# Patient Record
Sex: FEMALE | Race: WHITE | NOT HISPANIC OR LATINO | Employment: OTHER | URBAN - METROPOLITAN AREA
[De-identification: names, ages, dates, MRNs, and addresses within clinical notes are randomized per-mention and may not be internally consistent; named-entity substitution may affect disease eponyms.]

---

## 2018-05-17 ENCOUNTER — OFFICE VISIT (OUTPATIENT)
Dept: MEDICAL GROUP | Facility: PHYSICIAN GROUP | Age: 53
End: 2018-05-17
Payer: MEDICARE

## 2018-05-17 VITALS
DIASTOLIC BLOOD PRESSURE: 74 MMHG | OXYGEN SATURATION: 96 % | HEIGHT: 68 IN | SYSTOLIC BLOOD PRESSURE: 118 MMHG | BODY MASS INDEX: 33.8 KG/M2 | TEMPERATURE: 97.6 F | HEART RATE: 88 BPM | WEIGHT: 223 LBS

## 2018-05-17 DIAGNOSIS — F32.5 MAJOR DEPRESSIVE DISORDER WITH SINGLE EPISODE, IN FULL REMISSION (HCC): ICD-10-CM

## 2018-05-17 DIAGNOSIS — G35 MS (MULTIPLE SCLEROSIS) (HCC): ICD-10-CM

## 2018-05-17 DIAGNOSIS — D68.59 PROTEIN S DEFICIENCY (HCC): ICD-10-CM

## 2018-05-17 DIAGNOSIS — Z88.9 MULTIPLE ALLERGIES: ICD-10-CM

## 2018-05-17 DIAGNOSIS — E66.9 OBESITY (BMI 30-39.9): ICD-10-CM

## 2018-05-17 PROCEDURE — 99205 OFFICE O/P NEW HI 60 MIN: CPT | Performed by: FAMILY MEDICINE

## 2018-05-17 RX ORDER — WARFARIN SODIUM 10 MG/1
10 TABLET ORAL DAILY
COMMUNITY
End: 2018-05-17 | Stop reason: SDUPTHER

## 2018-05-17 RX ORDER — DIAZEPAM 5 MG/1
5 TABLET ORAL EVERY 6 HOURS PRN
COMMUNITY
End: 2018-08-06

## 2018-05-17 RX ORDER — WARFARIN SODIUM 10 MG/1
10 TABLET ORAL DAILY
Qty: 90 TAB | Refills: 1 | Status: ON HOLD | OUTPATIENT
Start: 2018-05-17 | End: 2018-08-07

## 2018-05-17 RX ORDER — PAROXETINE HYDROCHLORIDE 40 MG/1
40 TABLET, FILM COATED ORAL DAILY
Status: ON HOLD | COMMUNITY
End: 2018-08-15 | Stop reason: SDUPTHER

## 2018-05-17 RX ORDER — WARFARIN SODIUM 7.5 MG/1
7.5 TABLET ORAL DAILY
Qty: 90 TAB | Refills: 1 | Status: ON HOLD | OUTPATIENT
Start: 2018-05-17 | End: 2018-08-07

## 2018-05-17 RX ORDER — WARFARIN SODIUM 7.5 MG/1
7.5 TABLET ORAL DAILY
COMMUNITY
End: 2018-05-17 | Stop reason: SDUPTHER

## 2018-05-17 RX ORDER — CLONAZEPAM 2 MG/1
1 TABLET ORAL 2 TIMES DAILY
COMMUNITY
End: 2018-08-06

## 2018-05-17 ASSESSMENT — PATIENT HEALTH QUESTIONNAIRE - PHQ9: CLINICAL INTERPRETATION OF PHQ2 SCORE: 0

## 2018-05-17 NOTE — ASSESSMENT & PLAN NOTE
This problem is new to me; patient reports a long-standing history of allergies for which she takes over-the-counter Flonase and has used Claritin, Allegra, and Benadryl.

## 2018-05-17 NOTE — PROGRESS NOTES
Nadia Lazaro is a 52 y.o. female here for MS, prot S def, depression, allergies, obesity  HPI:  This is pleasant 53 yo F here today to [facility] care and presents with the following concerns:  MS (multiple sclerosis) (HCC)  This problem is new to me; patient reports a long-standing history of multiple sclerosis. She currently is being followed by neurologist and is on tysabri for medical management of this issue. She does report that she has multiple pain issues secondary to this problem. As a result she also follows with pain management and has a baclofen pain pump.    Protein S deficiency (HCC)  This problem is new to me; patient reports a long-standing history of protein S deficiency that was diagnosed when she was 16 years old. As a result she is currently anticoagulated on alternating days of Coumadin 10 mg and 7.5 mg. She states that in her lifetime she has had multiple blood clots in her legs and one PE. 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 She states that this does help to stabilize her mood; she currently denies any suicidal or homicidal ideation. She currently denies any suicidal plan, previous attempt, and a good support system    Multiple allergies  This problem is new to me; patient reports a long-standing history of allergies for which she takes over-the-counter Flonase and has used Claritin, Allegra, and Benadryl.    Obesity (BMI 30-39.9)  This problem is new to me; patient reports a long-standing history of issues with her weight. She states it is difficult for her to get much exercise due to pain. She does try to eat a healthy diet.    Current medicines (including changes today)  Current Outpatient Prescriptions   Medication Sig Dispense Refill   • paroxetine  (PAXIL) 40 MG tablet Take 40 mg by mouth every day.     • diazePAM (VALIUM) 5 MG Tab Take 5 mg by mouth every 6 hours as needed for Anxiety.     • clonazepam (KLONOPIN) 2 MG tablet Take 1 mg by mouth 2 times a day.     • warfarin (COUMADIN) 10 MG Tab Take 1 Tab by mouth every day. 90 Tab 1   • warfarin (COUMADIN) 7.5 MG Tab Take 1 Tab by mouth every day. 90 Tab 1   • BACLOFEN IT 60 mcg by Intrathecal route Continuous.     • acetaminophen (TYLENOL) 325 MG TABS Take 650 mg by mouth every four hours as needed. For minor aches and pains.  Indications: Pain     • docosahexanoic acid (FISH OIL) 1000 MG CAPS Take 1,000 mg by mouth every day.     • Natalizumab (TYSABRI IV) by Intravenous route. Every 8 weeks     • Multiple Vitamin (MULTIVITAMIN PO) Take  by mouth every day.     • vitamin D (CHOLECALCIFEROL) 1000 UNIT TABS Take 1,000 Units by mouth 2 Times a Day.       No current facility-administered medications for this visit.      She  has a past medical history of Backpain; Bronchitis; Fall; Heart burn; Indigestion; MS (multiple sclerosis) ([facility]); Personal history of venous thrombosis and embolism ([id number]); Protein S deficiency ([facility]); Psychiatric problem; and Unspecified urinary incontinence. She also has no past medical history of Angina; Arrhythmia; Arthritis; ASTHMA; Cancer ([facility]); CATARACT; Congestive heart failure ([facility]); COPD; Diabetes; Dialysis; Glaucoma; Heart murmur; Heart valve disease; Hypertension; Infectious disease; Jaundice; Myocardial infarct ([facility]); Other specified symptom associated with female genital organs; Pacemaker; Pneumonia; Renal disorder; Rheumatic fever; Seizure ([facility]); Stroke ([facility]); Unspecified disorder of thyroid; or Unspecified hemorrhagic conditions.  She  has a past surgical history that includes pump insert/remove (8/24/2010); other neurological surg; and pump insert/remove (10/19/2010).  Social History   Substance Use Topics   • Smoking status: Current Some Day Smoker     Packs/day: 0.25  "  • Smokeless tobacco: Never Used      Comment: 4 cigs/day, trying to quit   • Alcohol use No     Social History     Social History Narrative   • No narrative on file     Family History   Problem Relation Age of Onset   • Hypertension     • No Known Problems Mother    • Lung Disease Father      emphysema   • No Known Problems Sister    • No Known Problems Child    • Cancer Neg Hx    • Diabetes Neg Hx      Family Status   Relation Status   •     • Mother Alive   • Father    • Sister Alive   • Child Alive   • Neg Hx          ROS  No chest pain, no short of breath, no abdominal pain  All other systems reviewed and are negative     Objective:     Blood pressure 118/74, pulse 88, temperature 36.4 °C (97.6 °F), height 1.727 m (5' 8\"), weight 101.2 kg (223 lb), SpO2 96 %. Body mass index is 33.91 kg/m².  Physical Exam:    Constitutional: Alert, no distress.  Skin: Warm, dry, good turgor, no rashes in visible areas.  Eye: Equal, round and reactive, conjunctiva clear, lids normal.  ENMT: Lips without lesions, good dentition, oropharynx clear.  Neck: Trachea midline, no masses, no thyromegaly. No cervical or supraclavicular lymphadenopathy.  Respiratory: Unlabored respiratory effort, lungs clear to auscultation, no wheezes, no ronchi.  Cardiovascular: Normal S1, S2, no murmur, no edema.  Abdomen: Soft, non-tender, no masses, no hepatosplenomegaly.  Psych: Alert and oriented x3, normal affect and mood.  Neuro: CN 2-12 grossly intact      Assessment and Plan:   The following treatment plan was discussed     1. MS (multiple sclerosis) (HCC)  CBC WITH DIFFERENTIAL    Stable. Continue current medications; continue follow-up with neurology as directed; labs ordered for evaluation   2. Protein S deficiency (HCC)  REFERRAL TO ANTICOAGULATION MONITORING    Stable. Continue current medications; medication refill provided; referral to anticoagulation clinic for monitoring   3. Major depressive disorder with single episode, in " full remission (HCC)      Stable. Continue current medications; monitor   4. Multiple allergies      Stable. Continue Flonase and over-the-counter allergy medication as needed for symptom management   5. Obesity (BMI 30-39.9)  COMP METABOLIC PANEL    LIPID PROFILE    TSH    Uncontrolled; strongly encouraged patient to continue healthy eating plan and exercise as much as possible. Labs ordered for evaluation        Records requested.  Followup: Return in about 6 months (around 11/17/2018) for medication review, Short.

## 2018-05-17 NOTE — ASSESSMENT & PLAN NOTE
This problem is new to me; patient reports a long-standing history of multiple sclerosis. She currently is being followed by neurologist and is on tysabri for medical management of this issue. She does report that she has multiple pain issues secondary to this problem. As a result she also follows with pain management and has a baclofen pain pump.

## 2018-05-17 NOTE — ASSESSMENT & PLAN NOTE
This problem is new to me; patient has long-standing history of depression which she reports is well managed with Paxil 40 mg daily. She states that this does help to stabilize her mood; she currently denies any suicidal or homicidal ideation. She currently denies any suicidal plan, previous attempt, and a good support system

## 2018-05-17 NOTE — ASSESSMENT & PLAN NOTE
This problem is new to me; patient reports a long-standing history of protein S deficiency that was diagnosed when she was 16 years old. As a result she is currently anticoagulated on alternating days of Coumadin 10 mg and 7.5 mg. She states that in her lifetime she has had multiple blood clots in her legs and one PE. 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

## 2018-05-17 NOTE — ASSESSMENT & PLAN NOTE
This problem is new to me; patient reports a long-standing history of issues with her weight. She states it is difficult for her to get much exercise due to pain. She does try to eat a healthy diet.

## 2018-05-18 ENCOUNTER — TELEPHONE (OUTPATIENT)
Dept: VASCULAR LAB | Facility: MEDICAL CENTER | Age: 53
End: 2018-05-18

## 2018-05-18 NOTE — TELEPHONE ENCOUNTER
Called and left msg for pt to call back to establish care.  Pt's on warfarin.     Bea aMbry, PharmD

## 2018-05-19 ENCOUNTER — HOSPITAL ENCOUNTER (OUTPATIENT)
Dept: LAB | Facility: MEDICAL CENTER | Age: 53
End: 2018-05-19
Attending: FAMILY MEDICINE
Payer: MEDICARE

## 2018-05-19 DIAGNOSIS — E66.9 OBESITY (BMI 30-39.9): ICD-10-CM

## 2018-05-19 DIAGNOSIS — G35 MS (MULTIPLE SCLEROSIS) (HCC): ICD-10-CM

## 2018-05-19 LAB
ALBUMIN SERPL BCP-MCNC: 3.9 G/DL (ref 3.2–4.9)
ALBUMIN/GLOB SERPL: 1.7 G/DL
ALP SERPL-CCNC: 78 U/L (ref 30–99)
ALT SERPL-CCNC: 12 U/L (ref 2–50)
ANION GAP SERPL CALC-SCNC: 5 MMOL/L (ref 0–11.9)
AST SERPL-CCNC: 15 U/L (ref 12–45)
BASOPHILS # BLD AUTO: 0 % (ref 0–1.8)
BASOPHILS # BLD: 0 K/UL (ref 0–0.12)
BILIRUB SERPL-MCNC: 0.3 MG/DL (ref 0.1–1.5)
BUN SERPL-MCNC: 11 MG/DL (ref 8–22)
CALCIUM SERPL-MCNC: 9.1 MG/DL (ref 8.5–10.5)
CHLORIDE SERPL-SCNC: 111 MMOL/L (ref 96–112)
CHOLEST SERPL-MCNC: 193 MG/DL (ref 100–199)
CO2 SERPL-SCNC: 26 MMOL/L (ref 20–33)
CREAT SERPL-MCNC: 0.82 MG/DL (ref 0.5–1.4)
EOSINOPHIL # BLD AUTO: 0.39 K/UL (ref 0–0.51)
EOSINOPHIL NFR BLD: 3.5 % (ref 0–6.9)
ERYTHROCYTE [DISTWIDTH] IN BLOOD BY AUTOMATED COUNT: 49 FL (ref 35.9–50)
GIANT PLATELETS BLD QL SMEAR: NORMAL
GLOBULIN SER CALC-MCNC: 2.3 G/DL (ref 1.9–3.5)
GLUCOSE SERPL-MCNC: 90 MG/DL (ref 65–99)
HCT VFR BLD AUTO: 43.5 % (ref 37–47)
HDLC SERPL-MCNC: 43 MG/DL
HGB BLD-MCNC: 14.4 G/DL (ref 12–16)
LDLC SERPL CALC-MCNC: 119 MG/DL
LYMPHOCYTES # BLD AUTO: 4.18 K/UL (ref 1–4.8)
LYMPHOCYTES NFR BLD: 37.7 % (ref 22–41)
MANUAL DIFF BLD: NORMAL
MCH RBC QN AUTO: 30.8 PG (ref 27–33)
MCHC RBC AUTO-ENTMCNC: 33.1 G/DL (ref 33.6–35)
MCV RBC AUTO: 93.1 FL (ref 81.4–97.8)
MONOCYTES # BLD AUTO: 0.59 K/UL (ref 0–0.85)
MONOCYTES NFR BLD AUTO: 5.3 % (ref 0–13.4)
MORPHOLOGY BLD-IMP: NORMAL
NEUTROPHILS # BLD AUTO: 5.94 K/UL (ref 2–7.15)
NEUTROPHILS NFR BLD: 53.5 % (ref 44–72)
NRBC # BLD AUTO: 0.03 K/UL
NRBC BLD-RTO: 0.3 /100 WBC
OVALOCYTES BLD QL SMEAR: NORMAL
PLATELET # BLD AUTO: 271 K/UL (ref 164–446)
PLATELET BLD QL SMEAR: NORMAL
PMV BLD AUTO: 11.2 FL (ref 9–12.9)
POIKILOCYTOSIS BLD QL SMEAR: NORMAL
POTASSIUM SERPL-SCNC: 4 MMOL/L (ref 3.6–5.5)
PROT SERPL-MCNC: 6.2 G/DL (ref 6–8.2)
RBC # BLD AUTO: 4.67 M/UL (ref 4.2–5.4)
RBC BLD AUTO: PRESENT
SODIUM SERPL-SCNC: 142 MMOL/L (ref 135–145)
TRIGL SERPL-MCNC: 157 MG/DL (ref 0–149)
TSH SERPL DL<=0.005 MIU/L-ACNC: 1.07 UIU/ML (ref 0.38–5.33)
WBC # BLD AUTO: 11.1 K/UL (ref 4.8–10.8)

## 2018-05-19 PROCEDURE — 85027 COMPLETE CBC AUTOMATED: CPT

## 2018-05-19 PROCEDURE — 85007 BL SMEAR W/DIFF WBC COUNT: CPT

## 2018-05-19 PROCEDURE — 80061 LIPID PANEL: CPT

## 2018-05-19 PROCEDURE — 36415 COLL VENOUS BLD VENIPUNCTURE: CPT

## 2018-05-19 PROCEDURE — 84443 ASSAY THYROID STIM HORMONE: CPT

## 2018-05-19 PROCEDURE — 80053 COMPREHEN METABOLIC PANEL: CPT

## 2018-05-22 ENCOUNTER — TELEPHONE (OUTPATIENT)
Dept: VASCULAR LAB | Facility: MEDICAL CENTER | Age: 53
End: 2018-05-22

## 2018-05-22 NOTE — TELEPHONE ENCOUNTER
Attempted to reach pt regarding anticoagulation referral. No answer and NO VM set up.     Rosa Yeager, Pharm D

## 2018-05-31 ENCOUNTER — TELEPHONE (OUTPATIENT)
Dept: VASCULAR LAB | Facility: MEDICAL CENTER | Age: 53
End: 2018-05-31

## 2018-05-31 NOTE — LETTER
May 31, 2018        Nadia Lazaro  [address] [address]  [place] [address]      Dear Nadia Lazaro ,    We have been unsuccessful in our attempts to contact you regarding your Anticoagulation Service referral.  Anticoagulants are medications that can cause potential harmful side effects when not monitored properly. Without proper monitoring there is potential for serious, sometimes life-threatening bleeding problems or life-threatening blood clots or stroke could result.    Please contact our clinic so we may assist you.  We are open Monday-Friday 8 am until 5 pm.  You may reach our Service at [phone].    To monitor your anticoagulant effectively, we need to be able to communicate with you.  This is a requirement to be followed by our Service.  Please contact the clinic as soon as possible to establish care and provide your current contact information.  Thank you.        Sincerely,        Wally Caraballo, PharmD, [facility]  Pharmacy Clinical Supervisor  [facility]  Outpatient Anticoagulation Service

## 2018-05-31 NOTE — TELEPHONE ENCOUNTER
Called and left msg for pt to call back to establish care.   Sent letter as unable to establish care. FYI to PCP    Bea Mabry, PharmD

## 2018-06-26 ENCOUNTER — ANTICOAGULATION VISIT (OUTPATIENT)
Dept: MEDICAL GROUP | Facility: PHYSICIAN GROUP | Age: 53
End: 2018-06-26
Payer: MEDICARE

## 2018-06-26 DIAGNOSIS — Z79.01 CHRONIC ANTICOAGULATION: Primary | ICD-10-CM

## 2018-06-26 LAB — INR PPP: 3.7 (ref 2–3.5)

## 2018-06-26 PROCEDURE — 85610 PROTHROMBIN TIME: CPT | Performed by: FAMILY MEDICINE

## 2018-06-26 PROCEDURE — 99211 OFF/OP EST MAY X REQ PHY/QHP: CPT | Performed by: FAMILY MEDICINE

## 2018-06-26 NOTE — PROGRESS NOTES
Anticoagulation Summary  As of 6/26/2018    INR goal:   3.0-3.5   TTR:   --   Today's INR:   3.7!   Warfarin maintenance plan:   10 mg (5 mg x 2) on Tue, Sat; 7.5 mg (5 mg x 1.5) all other days   Weekly warfarin total:   57.5 mg   Plan last modified:   Ang De Paz, PharmD (6/26/2018)   Next INR check:   7/10/2018   Target end date:   Indefinite    Indications    Chronic anticoagulation [Z79.01]  Protein S deficiency (HCC) [D68.59]             Anticoagulation Episode Summary     INR check location:       Preferred lab:       Send INR reminders to:       Comments:         Anticoagulation Care Providers     Provider Role Specialty Phone number    Elana Gillespie D.O. Referring Family Medicine [phone]    [facility] Responsible  [phone]        Anticoagulation Patient Findings    HPI:    Patient presents to clinic today for initial visit for anticoagulation management due to history of Protein S deficiency and multiple VTE events.  Patient states that first clot was around the age of 16 when she had PE.  Oral contraceptives were thought to be provoking factor at that time.   She has since had multiple clots, including clots while on anticoagulation with warfarin.  IVC filter place in 2010, removed ~one year later.  Previously managed by [facility], will contact their clinic to obtain latest INR's and previous INR goal.  Patient states she has been kept in 3.0-3.5 range due to having VTE events while at 2.5.    Assessment:    Current Outpatient Prescriptions on File Prior to Visit   Medication Sig Dispense Refill   • paroxetine (PAXIL) 40 MG tablet Take 40 mg by mouth every day.     • diazePAM (VALIUM) 5 MG Tab Take 5 mg by mouth every 6 hours as needed for Anxiety.     • clonazepam (KLONOPIN) 2 MG tablet Take 1 mg by mouth 2 times a day.     • warfarin (COUMADIN) 10 MG Tab Take 1 Tab by mouth every day. 90 Tab 1   • warfarin (COUMADIN) 7.5 MG Tab Take 1 Tab by mouth every day. 90 Tab 1   •  BACLOFEN IT 60 mcg by Intrathecal route Continuous.     • acetaminophen (TYLENOL) 325 MG TABS Take 650 mg by mouth every four hours as needed. For minor aches and pains.  Indications: Pain     • docosahexanoic acid (FISH OIL) 1000 MG CAPS Take 1,000 mg by mouth every day.     • Natalizumab (TYSABRI IV) by Intravenous route. Every 8 weeks     • Multiple Vitamin (MULTIVITAMIN PO) Take  by mouth every day.     • vitamin D (CHOLECALCIFEROL) 1000 UNIT TABS Take 1,000 Units by mouth 2 Times a Day.       No current facility-administered medications on file prior to visit.      Past Medical History:   Diagnosis Date   • Backpain    • Bronchitis     distant past   • Fall     2nd to MS   • Heart burn    • Indigestion    • MS (multiple sclerosis) (HCC)    • Personal history of venous thrombosis and embolism [id number]    leg, PE, 8 blood clots, 1 aneurysm   • Protein S deficiency (HCC)     on coumadin   • Psychiatric problem     depression   • Unspecified urinary incontinence      Social History     Social History   • Marital status:      Spouse name: N/A   • Number of children: N/A   • Years of education: N/A     Occupational History   • Not on file.     Social History Main Topics   • Smoking status: Current Some Day Smoker     Packs/day: 0.25   • Smokeless tobacco: Never Used      Comment: 4 cigs/day, trying to quit   • Alcohol use No   • Drug use: No   • Sexual activity: Not Currently     Other Topics Concern   • Not on file     Social History Narrative   • No narrative on file         Pt is NOT new to warfarin, but new to [facility].  Discussed indication for warfarin therapy and INR goal range. Explained our services, hours of operation, warfarin therapy, potential SE, potential DI.. Discussed diet at length, with an emphasis on foods rich in vitamin K.  Discussed monitoring parameters, such as blood in urine, blood in stool, discussed what to do if a dose is missed, or suspected as missed.  Emphasized importance of  compliance including follow up. Discussed lifestyle choices of ETOH & smoking and its impact on therapy.      Discussed risks of pregnancy while on anticoagulation.    Pt signed new patient contract.  Copy will be scanned into media.      Pt denies any unusual s/s of bleeding, bruising, clotting or any changes to diet or medications.    Added [facility] to care team    INR slightly supratherapeutic at 3.7.  Patient states she has been out of town last few weeks and diet has been off.    Plan:     Pt is to continue with current warfarin dosing regimen and return to normal intake of vitamin K rich foods in order to bring INR back to therapeutic range.    Follow up in 2 weeks via home meter at patient's request, to reduce risk of adverse events related to this high risk medication,  Warfarin.    Ang De Paz, PharmD    CC Dr Michael Bloch

## 2018-06-27 ENCOUNTER — TELEPHONE (OUTPATIENT)
Dept: VASCULAR LAB | Facility: MEDICAL CENTER | Age: 53
End: 2018-06-27

## 2018-06-27 NOTE — TELEPHONE ENCOUNTER
Initial anticoag clinic note and most recent pcp note reviewed.  Will continue indefinite anticoag as directed by pcp for recurrent VTE and reported protein S deficiency.    Michael Bloch, MD  Anticoagulation Clinic    Cc:      SANJAY Gillespie

## 2018-07-03 ENCOUNTER — HOSPITAL ENCOUNTER (OUTPATIENT)
Dept: LAB | Facility: MEDICAL CENTER | Age: 53
End: 2018-07-03
Attending: PSYCHIATRY & NEUROLOGY
Payer: MEDICARE

## 2018-08-06 ENCOUNTER — APPOINTMENT (OUTPATIENT)
Dept: RADIOLOGY | Facility: MEDICAL CENTER | Age: 53
DRG: 813 | End: 2018-08-06
Attending: EMERGENCY MEDICINE
Payer: MEDICARE

## 2018-08-06 ENCOUNTER — HOSPITAL ENCOUNTER (INPATIENT)
Facility: MEDICAL CENTER | Age: 53
LOS: 9 days | DRG: 813 | End: 2018-08-16
Attending: EMERGENCY MEDICINE | Admitting: HOSPITALIST
Payer: MEDICARE

## 2018-08-06 DIAGNOSIS — D68.9 MEDICATION INDUCED COAGULOPATHY (HCC): ICD-10-CM

## 2018-08-06 DIAGNOSIS — S70.01XA HEMATOMA OF HIP, RIGHT, INITIAL ENCOUNTER: ICD-10-CM

## 2018-08-06 DIAGNOSIS — T50.905A MEDICATION INDUCED COAGULOPATHY (HCC): ICD-10-CM

## 2018-08-06 DIAGNOSIS — S37.011A HEMATOMA OF RIGHT KIDNEY, INITIAL ENCOUNTER: ICD-10-CM

## 2018-08-06 LAB
ANION GAP SERPL CALC-SCNC: 5 MMOL/L (ref 0–11.9)
APPEARANCE UR: ABNORMAL
APTT PPP: 160.8 SEC (ref 24.7–36)
BACTERIA #/AREA URNS HPF: NEGATIVE /HPF
BASOPHILS # BLD AUTO: 0.5 % (ref 0–1.8)
BASOPHILS # BLD: 0.09 K/UL (ref 0–0.12)
BILIRUB UR QL STRIP.AUTO: NEGATIVE
BUN SERPL-MCNC: 10 MG/DL (ref 8–22)
CALCIUM SERPL-MCNC: 8.5 MG/DL (ref 8.5–10.5)
CHLORIDE SERPL-SCNC: 108 MMOL/L (ref 96–112)
CO2 SERPL-SCNC: 23 MMOL/L (ref 20–33)
COLOR UR: ABNORMAL
CREAT SERPL-MCNC: 0.71 MG/DL (ref 0.5–1.4)
EOSINOPHIL # BLD AUTO: 0.22 K/UL (ref 0–0.51)
EOSINOPHIL NFR BLD: 1.3 % (ref 0–6.9)
EPI CELLS #/AREA URNS HPF: ABNORMAL /HPF
ERYTHROCYTE [DISTWIDTH] IN BLOOD BY AUTOMATED COUNT: 51 FL (ref 35.9–50)
GLUCOSE SERPL-MCNC: 117 MG/DL (ref 65–99)
GLUCOSE UR STRIP.AUTO-MCNC: NEGATIVE MG/DL
HCT VFR BLD AUTO: 34.1 % (ref 37–47)
HGB BLD-MCNC: 11.2 G/DL (ref 12–16)
HYALINE CASTS #/AREA URNS LPF: ABNORMAL /LPF
IMM GRANULOCYTES # BLD AUTO: 0.09 K/UL (ref 0–0.11)
IMM GRANULOCYTES NFR BLD AUTO: 0.5 % (ref 0–0.9)
INR PPP: >=10 (ref 0.87–1.13)
KETONES UR STRIP.AUTO-MCNC: NEGATIVE MG/DL
LEUKOCYTE ESTERASE UR QL STRIP.AUTO: ABNORMAL
LYMPHOCYTES # BLD AUTO: 3.54 K/UL (ref 1–4.8)
LYMPHOCYTES NFR BLD: 20.9 % (ref 22–41)
MCH RBC QN AUTO: 29.9 PG (ref 27–33)
MCHC RBC AUTO-ENTMCNC: 32.8 G/DL (ref 33.6–35)
MCV RBC AUTO: 91.2 FL (ref 81.4–97.8)
MICRO URNS: ABNORMAL
MONOCYTES # BLD AUTO: 1.69 K/UL (ref 0–0.85)
MONOCYTES NFR BLD AUTO: 10 % (ref 0–13.4)
NEUTROPHILS # BLD AUTO: 11.28 K/UL (ref 2–7.15)
NEUTROPHILS NFR BLD: 66.8 % (ref 44–72)
NITRITE UR QL STRIP.AUTO: NEGATIVE
NRBC # BLD AUTO: 0.04 K/UL
NRBC BLD-RTO: 0.2 /100 WBC
PH UR STRIP.AUTO: 5.5 [PH]
PLATELET # BLD AUTO: 273 K/UL (ref 164–446)
PMV BLD AUTO: 11.2 FL (ref 9–12.9)
POTASSIUM SERPL-SCNC: 4 MMOL/L (ref 3.6–5.5)
PROT UR QL STRIP: 30 MG/DL
PROTHROMBIN TIME: 97.9 SEC (ref 12–14.6)
RBC # BLD AUTO: 3.74 M/UL (ref 4.2–5.4)
RBC # URNS HPF: >150 /HPF
RBC UR QL AUTO: ABNORMAL
SODIUM SERPL-SCNC: 136 MMOL/L (ref 135–145)
SP GR UR STRIP.AUTO: 1.01
UROBILINOGEN UR STRIP.AUTO-MCNC: 0.2 MG/DL
WBC # BLD AUTO: 16.9 K/UL (ref 4.8–10.8)
WBC #/AREA URNS HPF: ABNORMAL /HPF

## 2018-08-06 PROCEDURE — 700111 HCHG RX REV CODE 636 W/ 250 OVERRIDE (IP): Performed by: EMERGENCY MEDICINE

## 2018-08-06 PROCEDURE — 85025 COMPLETE CBC W/AUTO DIFF WBC: CPT

## 2018-08-06 PROCEDURE — 96375 TX/PRO/DX INJ NEW DRUG ADDON: CPT

## 2018-08-06 PROCEDURE — 700102 HCHG RX REV CODE 250 W/ 637 OVERRIDE(OP): Performed by: EMERGENCY MEDICINE

## 2018-08-06 PROCEDURE — 99285 EMERGENCY DEPT VISIT HI MDM: CPT

## 2018-08-06 PROCEDURE — 81001 URINALYSIS AUTO W/SCOPE: CPT

## 2018-08-06 PROCEDURE — 93971 EXTREMITY STUDY: CPT

## 2018-08-06 PROCEDURE — 85610 PROTHROMBIN TIME: CPT

## 2018-08-06 PROCEDURE — A9270 NON-COVERED ITEM OR SERVICE: HCPCS | Performed by: EMERGENCY MEDICINE

## 2018-08-06 PROCEDURE — 85730 THROMBOPLASTIN TIME PARTIAL: CPT

## 2018-08-06 PROCEDURE — 93005 ELECTROCARDIOGRAM TRACING: CPT | Performed by: EMERGENCY MEDICINE

## 2018-08-06 PROCEDURE — 80048 BASIC METABOLIC PNL TOTAL CA: CPT

## 2018-08-06 RX ORDER — ACETAMINOPHEN 325 MG/1
650 TABLET ORAL ONCE
Status: COMPLETED | OUTPATIENT
Start: 2018-08-06 | End: 2018-08-06

## 2018-08-06 RX ORDER — LORAZEPAM 2 MG/ML
1 INJECTION INTRAMUSCULAR ONCE
Status: COMPLETED | OUTPATIENT
Start: 2018-08-07 | End: 2018-08-06

## 2018-08-06 RX ADMIN — ACETAMINOPHEN 650 MG: 325 TABLET, FILM COATED ORAL at 19:50

## 2018-08-06 RX ADMIN — LORAZEPAM 1 MG: 2 INJECTION INTRAMUSCULAR; INTRAVENOUS at 23:43

## 2018-08-06 ASSESSMENT — PAIN SCALES - GENERAL
PAINLEVEL_OUTOF10: 9
PAINLEVEL_OUTOF10: 7

## 2018-08-06 NOTE — ED TRIAGE NOTES
Nadia Lazaro  53 y.o.  Chief Complaint   Patient presents with   • Leg Pain     Pt states she has R leg pain and a hx of 11 DVT's r/t a protein S deficiency that makes her susceptible to clots. Pt takes coumadin as prescribed. Pt states this feels the same as last time she had a DVT. No swelling or redness noted.     Triage process explained to patient, educated pt to notify staff at  if s/s worsened, apologized for wait time, and returned pt to lobby.

## 2018-08-07 ENCOUNTER — PATIENT OUTREACH (OUTPATIENT)
Dept: HEALTH INFORMATION MANAGEMENT | Facility: OTHER | Age: 53
End: 2018-08-07

## 2018-08-07 PROBLEM — T45.515A WARFARIN-INDUCED COAGULOPATHY (HCC): Status: ACTIVE | Noted: 2018-06-26

## 2018-08-07 PROBLEM — S70.01XA HEMATOMA OF RIGHT HIP: Status: ACTIVE | Noted: 2018-08-07

## 2018-08-07 PROBLEM — D68.9 COAGULOPATHY (HCC): Status: ACTIVE | Noted: 2018-08-07

## 2018-08-07 PROBLEM — T45.515A WARFARIN-INDUCED COAGULOPATHY (HCC): Chronic | Status: ACTIVE | Noted: 2018-06-26

## 2018-08-07 PROBLEM — D68.32 WARFARIN-INDUCED COAGULOPATHY (HCC): Chronic | Status: ACTIVE | Noted: 2018-06-26

## 2018-08-07 PROBLEM — D68.32 WARFARIN-INDUCED COAGULOPATHY (HCC): Status: ACTIVE | Noted: 2018-06-26

## 2018-08-07 PROBLEM — D62 ANEMIA DUE TO ACUTE BLOOD LOSS: Status: ACTIVE | Noted: 2018-08-07

## 2018-08-07 PROBLEM — T45.511A WARFARIN TOXICITY, ACCIDENTAL OR UNINTENTIONAL, INITIAL ENCOUNTER: Status: ACTIVE | Noted: 2018-08-07

## 2018-08-07 LAB
ABO GROUP BLD: NORMAL
ALBUMIN SERPL BCP-MCNC: 3.6 G/DL (ref 3.2–4.9)
ALBUMIN/GLOB SERPL: 1.6 G/DL
ALP SERPL-CCNC: 63 U/L (ref 30–99)
ALT SERPL-CCNC: 8 U/L (ref 2–50)
ANION GAP SERPL CALC-SCNC: 8 MMOL/L (ref 0–11.9)
AST SERPL-CCNC: 11 U/L (ref 12–45)
BARCODED ABORH UBTYP: 5100
BARCODED ABORH UBTYP: 6200
BARCODED PRD CODE UBPRD: NORMAL
BARCODED PRD CODE UBPRD: NORMAL
BARCODED UNIT NUM UBUNT: NORMAL
BARCODED UNIT NUM UBUNT: NORMAL
BILIRUB SERPL-MCNC: 0.5 MG/DL (ref 0.1–1.5)
BUN SERPL-MCNC: 10 MG/DL (ref 8–22)
CALCIUM SERPL-MCNC: 8.2 MG/DL (ref 8.5–10.5)
CFT BLD TEG: 12.9 MIN (ref 5–10)
CHLORIDE SERPL-SCNC: 108 MMOL/L (ref 96–112)
CK SERPL-CCNC: 73 U/L (ref 0–154)
CLOT ANGLE BLD TEG: 54.9 DEGREES (ref 53–72)
CLOT LYSIS 30M P MA LENFR BLD TEG: 0 % (ref 0–8)
CO2 SERPL-SCNC: 22 MMOL/L (ref 20–33)
COMPONENT FT 8504FT: NORMAL
COMPONENT FT 8504FT: NORMAL
CREAT SERPL-MCNC: 0.75 MG/DL (ref 0.5–1.4)
CT.EXTRINSIC BLD ROTEM: 2.9 MIN (ref 1–3)
ERYTHROCYTE [DISTWIDTH] IN BLOOD BY AUTOMATED COUNT: 49.7 FL (ref 35.9–50)
ERYTHROCYTE [DISTWIDTH] IN BLOOD BY AUTOMATED COUNT: 50.3 FL (ref 35.9–50)
ERYTHROCYTE [DISTWIDTH] IN BLOOD BY AUTOMATED COUNT: 51.3 FL (ref 35.9–50)
GLOBULIN SER CALC-MCNC: 2.3 G/DL (ref 1.9–3.5)
GLUCOSE SERPL-MCNC: 127 MG/DL (ref 65–99)
HCT VFR BLD AUTO: 25.7 % (ref 37–47)
HCT VFR BLD AUTO: 26.6 % (ref 37–47)
HCT VFR BLD AUTO: 32.2 % (ref 37–47)
HGB BLD-MCNC: 10.9 G/DL (ref 12–16)
HGB BLD-MCNC: 8.8 G/DL (ref 12–16)
HGB BLD-MCNC: 8.9 G/DL (ref 12–16)
INR PPP: 4.6 (ref 0.87–1.13)
INR PPP: >=10 (ref 0.87–1.13)
MCF BLD TEG: 70.7 MM (ref 50–70)
MCH RBC QN AUTO: 29.9 PG (ref 27–33)
MCH RBC QN AUTO: 30.4 PG (ref 27–33)
MCH RBC QN AUTO: 31 PG (ref 27–33)
MCHC RBC AUTO-ENTMCNC: 33.1 G/DL (ref 33.6–35)
MCHC RBC AUTO-ENTMCNC: 33.9 G/DL (ref 33.6–35)
MCHC RBC AUTO-ENTMCNC: 34.6 G/DL (ref 33.6–35)
MCV RBC AUTO: 89.5 FL (ref 81.4–97.8)
MCV RBC AUTO: 89.9 FL (ref 81.4–97.8)
MCV RBC AUTO: 90.5 FL (ref 81.4–97.8)
PLATELET # BLD AUTO: 248 K/UL (ref 164–446)
PLATELET # BLD AUTO: 249 K/UL (ref 164–446)
PLATELET # BLD AUTO: 274 K/UL (ref 164–446)
PMV BLD AUTO: 10.8 FL (ref 9–12.9)
PMV BLD AUTO: 11 FL (ref 9–12.9)
PMV BLD AUTO: 11.1 FL (ref 9–12.9)
POTASSIUM SERPL-SCNC: 3.8 MMOL/L (ref 3.6–5.5)
PRODUCT TYPE UPROD: NORMAL
PRODUCT TYPE UPROD: NORMAL
PROT SERPL-MCNC: 5.9 G/DL (ref 6–8.2)
PROTHROMBIN TIME: 43.3 SEC (ref 12–14.6)
PROTHROMBIN TIME: 93.2 SEC (ref 12–14.6)
RBC # BLD AUTO: 2.87 M/UL (ref 4.2–5.4)
RBC # BLD AUTO: 2.94 M/UL (ref 4.2–5.4)
RBC # BLD AUTO: 3.58 M/UL (ref 4.2–5.4)
RH BLD: NORMAL
SODIUM SERPL-SCNC: 138 MMOL/L (ref 135–145)
TEG ALGORITHM TGALG: ABNORMAL
UNIT STATUS USTAT: NORMAL
UNIT STATUS USTAT: NORMAL
WBC # BLD AUTO: 16.4 K/UL (ref 4.8–10.8)
WBC # BLD AUTO: 16.5 K/UL (ref 4.8–10.8)
WBC # BLD AUTO: 19.1 K/UL (ref 4.8–10.8)

## 2018-08-07 PROCEDURE — 86901 BLOOD TYPING SEROLOGIC RH(D): CPT

## 2018-08-07 PROCEDURE — 700117 HCHG RX CONTRAST REV CODE 255: Performed by: EMERGENCY MEDICINE

## 2018-08-07 PROCEDURE — 85347 COAGULATION TIME ACTIVATED: CPT

## 2018-08-07 PROCEDURE — 700105 HCHG RX REV CODE 258: Performed by: STUDENT IN AN ORGANIZED HEALTH CARE EDUCATION/TRAINING PROGRAM

## 2018-08-07 PROCEDURE — 82550 ASSAY OF CK (CPK): CPT

## 2018-08-07 PROCEDURE — 85025 COMPLETE CBC W/AUTO DIFF WBC: CPT

## 2018-08-07 PROCEDURE — 36430 TRANSFUSION BLD/BLD COMPNT: CPT

## 2018-08-07 PROCEDURE — P9017 PLASMA 1 DONOR FRZ W/IN 8 HR: HCPCS

## 2018-08-07 PROCEDURE — 85027 COMPLETE CBC AUTOMATED: CPT | Mod: 91

## 2018-08-07 PROCEDURE — 72131 CT LUMBAR SPINE W/O DYE: CPT

## 2018-08-07 PROCEDURE — 770020 HCHG ROOM/CARE - TELE (206)

## 2018-08-07 PROCEDURE — A9270 NON-COVERED ITEM OR SERVICE: HCPCS | Performed by: STUDENT IN AN ORGANIZED HEALTH CARE EDUCATION/TRAINING PROGRAM

## 2018-08-07 PROCEDURE — 700111 HCHG RX REV CODE 636 W/ 250 OVERRIDE (IP): Performed by: EMERGENCY MEDICINE

## 2018-08-07 PROCEDURE — 85610 PROTHROMBIN TIME: CPT | Mod: 91

## 2018-08-07 PROCEDURE — 85576 BLOOD PLATELET AGGREGATION: CPT

## 2018-08-07 PROCEDURE — 80053 COMPREHEN METABOLIC PANEL: CPT | Mod: 91

## 2018-08-07 PROCEDURE — A9270 NON-COVERED ITEM OR SERVICE: HCPCS | Performed by: INTERNAL MEDICINE

## 2018-08-07 PROCEDURE — 36415 COLL VENOUS BLD VENIPUNCTURE: CPT

## 2018-08-07 PROCEDURE — 700102 HCHG RX REV CODE 250 W/ 637 OVERRIDE(OP): Performed by: STUDENT IN AN ORGANIZED HEALTH CARE EDUCATION/TRAINING PROGRAM

## 2018-08-07 PROCEDURE — 85384 FIBRINOGEN ACTIVITY: CPT

## 2018-08-07 PROCEDURE — 700105 HCHG RX REV CODE 258: Performed by: EMERGENCY MEDICINE

## 2018-08-07 PROCEDURE — A9270 NON-COVERED ITEM OR SERVICE: HCPCS | Performed by: EMERGENCY MEDICINE

## 2018-08-07 PROCEDURE — 96365 THER/PROPH/DIAG IV INF INIT: CPT

## 2018-08-07 PROCEDURE — 700102 HCHG RX REV CODE 250 W/ 637 OVERRIDE(OP): Performed by: INTERNAL MEDICINE

## 2018-08-07 PROCEDURE — 700102 HCHG RX REV CODE 250 W/ 637 OVERRIDE(OP): Performed by: EMERGENCY MEDICINE

## 2018-08-07 PROCEDURE — 74177 CT ABD & PELVIS W/CONTRAST: CPT

## 2018-08-07 PROCEDURE — 86900 BLOOD TYPING SEROLOGIC ABO: CPT

## 2018-08-07 PROCEDURE — 700105 HCHG RX REV CODE 258

## 2018-08-07 PROCEDURE — 99223 1ST HOSP IP/OBS HIGH 75: CPT | Mod: GC | Performed by: HOSPITALIST

## 2018-08-07 PROCEDURE — 30233K1 TRANSFUSION OF NONAUTOLOGOUS FROZEN PLASMA INTO PERIPHERAL VEIN, PERCUTANEOUS APPROACH: ICD-10-PCS | Performed by: HOSPITALIST

## 2018-08-07 RX ORDER — PAROXETINE HYDROCHLORIDE 20 MG/1
40 TABLET, FILM COATED ORAL DAILY
Status: DISCONTINUED | OUTPATIENT
Start: 2018-08-07 | End: 2018-08-07

## 2018-08-07 RX ORDER — SODIUM CHLORIDE 9 MG/ML
INJECTION, SOLUTION INTRAVENOUS
Status: COMPLETED
Start: 2018-08-07 | End: 2018-08-07

## 2018-08-07 RX ORDER — PHYTONADIONE 5 MG/1
5 TABLET ORAL ONCE
Status: COMPLETED | OUTPATIENT
Start: 2018-08-07 | End: 2018-08-07

## 2018-08-07 RX ORDER — AMOXICILLIN 250 MG
2 CAPSULE ORAL 2 TIMES DAILY
Status: DISCONTINUED | OUTPATIENT
Start: 2018-08-07 | End: 2018-08-16 | Stop reason: HOSPADM

## 2018-08-07 RX ORDER — SODIUM CHLORIDE 9 MG/ML
INJECTION, SOLUTION INTRAVENOUS CONTINUOUS
Status: DISCONTINUED | OUTPATIENT
Start: 2018-08-07 | End: 2018-08-09

## 2018-08-07 RX ORDER — POLYETHYLENE GLYCOL 3350 17 G/17G
1 POWDER, FOR SOLUTION ORAL
Status: DISCONTINUED | OUTPATIENT
Start: 2018-08-07 | End: 2018-08-16 | Stop reason: HOSPADM

## 2018-08-07 RX ORDER — WARFARIN SODIUM 5 MG/1
7.5-1 TABLET ORAL DAILY
Status: ON HOLD | COMMUNITY
End: 2018-08-16

## 2018-08-07 RX ORDER — BISACODYL 10 MG
10 SUPPOSITORY, RECTAL RECTAL
Status: DISCONTINUED | OUTPATIENT
Start: 2018-08-07 | End: 2018-08-16 | Stop reason: HOSPADM

## 2018-08-07 RX ADMIN — SODIUM CHLORIDE: 9 INJECTION, SOLUTION INTRAVENOUS at 15:30

## 2018-08-07 RX ADMIN — Medication 2 TABLET: at 05:57

## 2018-08-07 RX ADMIN — SODIUM CHLORIDE: 9 INJECTION, SOLUTION INTRAVENOUS at 03:33

## 2018-08-07 RX ADMIN — IOHEXOL 100 ML: 350 INJECTION, SOLUTION INTRAVENOUS at 00:30

## 2018-08-07 RX ADMIN — VITAMIN D, TAB 1000IU (100/BT) 1000 UNITS: 25 TAB at 17:24

## 2018-08-07 RX ADMIN — VITAMIN D, TAB 1000IU (100/BT) 1000 UNITS: 25 TAB at 05:57

## 2018-08-07 RX ADMIN — CEFTRIAXONE SODIUM 1 G: 1 INJECTION, POWDER, FOR SOLUTION INTRAMUSCULAR; INTRAVENOUS at 00:45

## 2018-08-07 RX ADMIN — PHYTONADIONE 5 MG: 5 TABLET ORAL at 00:41

## 2018-08-07 RX ADMIN — PHYTONADIONE 5 MG: 5 TABLET ORAL at 11:44

## 2018-08-07 RX ADMIN — SODIUM CHLORIDE: 9 INJECTION, SOLUTION INTRAVENOUS at 10:15

## 2018-08-07 ASSESSMENT — ENCOUNTER SYMPTOMS
EYES NEGATIVE: 1
MUSCULOSKELETAL NEGATIVE: 1
WEAKNESS: 0
DIARRHEA: 0
GASTROINTESTINAL NEGATIVE: 1
CONSTIPATION: 0
NAUSEA: 1
DOUBLE VISION: 0
PSYCHIATRIC NEGATIVE: 1
RESPIRATORY NEGATIVE: 1
HEMOPTYSIS: 0
NAUSEA: 0
ABDOMINAL PAIN: 0
PALPITATIONS: 0
FLANK PAIN: 0
TINGLING: 0
BACK PAIN: 0
SHORTNESS OF BREATH: 0
BACK PAIN: 1
SENSORY CHANGE: 0
NEUROLOGICAL NEGATIVE: 1
CONSTITUTIONAL NEGATIVE: 1
BRUISES/BLEEDS EASILY: 1
DIZZINESS: 0
MYALGIAS: 1
BLURRED VISION: 0
BRUISES/BLEEDS EASILY: 0
VOMITING: 0
FEVER: 0
CHILLS: 0
FALLS: 0
VOMITING: 1
CARDIOVASCULAR NEGATIVE: 1
BLOOD IN STOOL: 0
COUGH: 0
HEADACHES: 0

## 2018-08-07 ASSESSMENT — COGNITIVE AND FUNCTIONAL STATUS - GENERAL
SUGGESTED CMS G CODE MODIFIER DAILY ACTIVITY: CJ
DRESSING REGULAR UPPER BODY CLOTHING: A LITTLE
TOILETING: A LITTLE
MOBILITY SCORE: 18
MOVING FROM LYING ON BACK TO SITTING ON SIDE OF FLAT BED: A LITTLE
DAILY ACTIVITIY SCORE: 20
SUGGESTED CMS G CODE MODIFIER MOBILITY: CK
TURNING FROM BACK TO SIDE WHILE IN FLAT BAD: A LITTLE
CLIMB 3 TO 5 STEPS WITH RAILING: A LITTLE
DRESSING REGULAR LOWER BODY CLOTHING: A LITTLE
MOVING TO AND FROM BED TO CHAIR: A LITTLE
STANDING UP FROM CHAIR USING ARMS: A LITTLE
WALKING IN HOSPITAL ROOM: A LITTLE
HELP NEEDED FOR BATHING: A LITTLE

## 2018-08-07 ASSESSMENT — PATIENT HEALTH QUESTIONNAIRE - PHQ9
SUM OF ALL RESPONSES TO PHQ9 QUESTIONS 1 AND 2: 0
1. LITTLE INTEREST OR PLEASURE IN DOING THINGS: NOT AT ALL
1. LITTLE INTEREST OR PLEASURE IN DOING THINGS: NOT AT ALL
2. FEELING DOWN, DEPRESSED, IRRITABLE, OR HOPELESS: NOT AT ALL
SUM OF ALL RESPONSES TO PHQ9 QUESTIONS 1 AND 2: 0
2. FEELING DOWN, DEPRESSED, IRRITABLE, OR HOPELESS: NOT AT ALL

## 2018-08-07 ASSESSMENT — PAIN SCALES - GENERAL
PAINLEVEL_OUTOF10: 6
PAINLEVEL_OUTOF10: 6
PAINLEVEL_OUTOF10: 8
PAINLEVEL_OUTOF10: 6

## 2018-08-07 ASSESSMENT — LIFESTYLE VARIABLES
EVER_SMOKED: YES
ALCOHOL_USE: NO

## 2018-08-07 NOTE — ASSESSMENT & PLAN NOTE
Took a 10mg tablet instead of a 7.5mg tablet recently. This may have contributed to her acute bleeding.  - Cont INR checks, her baseline is 3-4.  - INR 2.3 --> still subtherapeutic compared to her baseline. Cont Heparin IV.  - Warfarin 12.5mg qhs.

## 2018-08-07 NOTE — ASSESSMENT & PLAN NOTE
- Hemodynamically stable, no signs of compartment syndrome.  Pain minimal in her leg at this point. Bruising along lateral leg does not indicate any new bleeding.  - INR 2.3 --> Continue Heparin IV.  - Warfarin 12.5mg qhs.  - Continue to monitor for signs of compartment syndrome: paresthesias, pain out of proportion, weakness, decreased pulses.

## 2018-08-07 NOTE — CARE PLAN
Problem: Safety  Goal: Will remain free from falls  Outcome: PROGRESSING AS EXPECTED  Pt. Educated on importance of calling staff when need to ambulate, verbalized understanding, treaded socks placed, bed locked in lowest position, call light with in reach.       Problem: Infection  Goal: Will remain free from infection  Outcome: PROGRESSING AS EXPECTED  Pt. Educated on importance on proper hand hygiene, monitor signs and symptoms of infection, performed proper hand hygiene and foam in and out.

## 2018-08-07 NOTE — PROGRESS NOTES
Patient resting in bed. FFP running. No transfusion reaction noted. Does c/o right sided pain, same as initial assessment. Bed in low locked position, call bell within reach. Continue to monitor.

## 2018-08-07 NOTE — ED NOTES
Vitals taken while in ED Lobby. Apologized to pt for their wait time, told pt they will be roomed as soon as possible. Pt tearful while measuring BP, pt states their pain has increased while waiting. Pt told that the triage RN will be updated. Triage RN notified.

## 2018-08-07 NOTE — ASSESSMENT & PLAN NOTE
-chronically on  Warfarin with INR 3-4 at home.  Has had 11 DVT's in past, including one at INR 2.7 before.  - Had IVC filter in place from [id number]. Had it removed last year as it had perforated too high.  - Continue to monitor coagulation

## 2018-08-07 NOTE — SENIOR ADMIT NOTE
HPI:  Patient is a 53 year old female with past medical history of protein S deficiency on chronic anticoagulation with coumadin who presents to the ED with complaint of right leg pain. Patient states that last Thursday, she developed pain in her right upper leg all the way down to the calf which progressively worsened over the last few days. She had been moving heavy boxes all of last week, but she does not recall any injury or trauma to her leg. She reports being diagnosed with protein S deficiency at the age of 16 and has been on coumadin since then. She has had 11 DVTs in total and has had clots even when therapeutic on coumadin. She had 1 episode of PE. Her last DVT was diagnosed in 2010.   Patient's INR in the ED was found to be >10. She reports that she last checked her INR 2 weeks back and it was 4.2. There have been no changes in her medications. She does report some dietary changes as she has been eating out more often and eating less greens.     Right LE venous duplex in the ED was negative for DVT . CT abdomen/pelvis showed hematoma within the right gluteus muscle.    Physical exam:  General: Well developed, obese, not in any distress  CVS; S1S2 normal, regular rate and rhythm, no murmurs  RS: Normal vesicular breath sounds, no rhonchi or rales  Abdomen: Soft, non tender, non distended  Extremities: Right lower extremity: Mild swelling on the right lateral/posterior leg with bruise.  The muscle compartment does not appear tense.    ASSESSMENT AND PLAN:    # Right gluteus muscle hematoma  # Supra therapeutic INR  # Right renal hilum hemorrhage/edema  # Protein S deficiency   - CT abdomen/pelvis showing small edema/hemorrhage in the right renal hilum and possible hematoma in the right gluteus muscle. PT/INR/ APTT significantly elevated 97.9/>10/160.8  - drop in hemoglobin noted from 14.4 on 05/19 to 11.2. Patient is hemodynamically stable- no tachycardia or hypotension.   - Ortho consulted in the ED , who  advised watching for compartment syndrome  - She received 5 mg oral vitamin K in the ED.   - As patient is hemodynamically stable with no life threatening hemorrhages and given her propensity for clots, full reversal with FFP was not attempted.  - Monitor PT/INR. Watch for symptoms/signs of compartment syndrome. If there is worsening of hematoma, patient may need CTA for possible embolization.   - H&H Q6 hours    # Leukocytosis  - Most likely reactive. She did receive ceftriaxone in the ED for possible UTI. Patient however denies any urinary symptoms.  - Monitor    # Multiple sclerosis  - on natalizumab every 8 weeks  - Has baclofen pain pump    For more details, please refer to Dr. Bond's H&P

## 2018-08-07 NOTE — ED PROVIDER NOTES
"ED Provider Note    Scribed for Ang Vicente M.D. by Terrie Asif. 8/6/2018, 6:50 PM.    Primary care provider: Elana Gillespie D.O.  Means of arrival: walk in   History obtained from: patient  History limited by: none    CHIEF COMPLAINT  Chief Complaint   Patient presents with   • Leg Pain       HPI  Nadia Lazaro is a 53 y.o. Female with a history of DVT who presents to the Emergency Department for evaluation of right leg and foot pain. She describes the pain as \"pins and needles\" that radiates down her buttock into her right calf. She reports that the pain is worse in the back of her right leg. The pain in her right leg and buttock is exacerbated upon elevation of the leg. Patient confirms numbness in her right foot and ankle. She explains that her lower back is always tender at baseline. 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 Patient is currently on coumadin and has not missed any doses recently. Her last INR was 4.2 She has had DVTs while on coumadin in the past but has not tried any other blood thinners. Patient also has a history of MS.    REVIEW OF SYSTEMS  Pertinent positives include leg pain, numbness, back pain. Pertinent negatives include fever. All other systems negative.  C    PAST MEDICAL HISTORY   has a past medical history of Backpain; Bronchitis; Fall; Heart burn; Indigestion; MS (multiple sclerosis) ([facility]); Personal history of venous thrombosis and embolism ([id number]); Protein S deficiency (HCC); Psychiatric problem; and Unspecified urinary incontinence.    SURGICAL HISTORY   has a past surgical history that includes pump insert/remove (8/24/2010); other neurological surg; and pump insert/remove (10/19/2010).    SOCIAL HISTORY  Social History   Substance Use Topics   • Smoking status: Current Some Day Smoker     Packs/day: 0.25   • Smokeless tobacco: Never Used      Comment: 4 cigs/day, trying to quit   • Alcohol use No      History   Drug Use No       FAMILY HISTORY  Family " "History   Problem Relation Age of Onset   • Hypertension Unknown    • No Known Problems Mother    • Lung Disease Father         emphysema   • No Known Problems Sister    • No Known Problems Child    • Cancer Neg Hx    • Diabetes Neg Hx        CURRENT MEDICATIONS  Home Medications     Reviewed by Bibiana Sexton R.N. (Registered Nurse) on 08/06/18 at 1757  Med List Status: Complete   Medication Last Dose Status   acetaminophen (TYLENOL) 325 MG TABS 5/17/2018 Active   BACLOFEN IT 8/6/2018 Active   docosahexanoic acid (FISH OIL) 1000 MG CAPS 5/17/2018 Active   Multiple Vitamin (MULTIVITAMIN PO)  Active   Natalizumab (TYSABRI IV) 7/29/2018 Active   paroxetine (PAXIL) 40 MG tablet 5/17/2018 Active   vitamin D (CHOLECALCIFEROL) 1000 UNIT TABS 8/6/2018 Active   warfarin (COUMADIN) 10 MG Tab  Active   warfarin (COUMADIN) 7.5 MG Tab  Active                ALLERGIES  Allergies   Allergen Reactions   • Nubain [Nalbuphine Hcl]      Seizures         PHYSICAL EXAM  VITAL SIGNS: /77   Pulse 87   Temp 36.6 °C (97.8 °F)   Resp 18   Ht 1.753 m (5' 9\")   Wt 99.8 kg (220 lb)   SpO2 95%   BMI 32.49 kg/m²     Constitutional: Well developed, Well nourished  HENT: Normocephalic, Atraumatic, Oropharynx moist.   Eyes: Conjunctiva normal, No discharge.   Neck: Supple, No stridor  Cardiovascular: Normal heart rate, Normal rhythm, No murmurs, equal pulses.   Pulmonary: Normal breath sounds, No respiratory distress, No wheezing, No rales, No rhonchi.  Chest: No chest wall tenderness or deformity.   Abdomen:Soft, No tenderness, No masses, no rebound, no guarding.   Back: No CVA tenderness. Lumbar tenderness to right lumbar paraspinal region. Pain radiation down on buttock on straight leg raise test, but not past knee to 90 degrees  Musculoskeletal: No major deformities noted. 5/5 strength to bilateral lower extremities. Right calf tenderness, right calf slightly larger than left, no chords felt, normal pulses, normal capillary " refill.   Skin: Warm, Dry, Ecchymosis to right gluteus , No rash.   Neurologic: Alert & oriented x 3, Normal motor function,  No focal deficits noted.   Psychiatric: Affect normal, Judgment normal, Mood normal.     LABS  Results for orders placed or performed during the hospital encounter of 08/06/18   CBC WITH DIFFERENTIAL   Result Value Ref Range    WBC 16.9 (H) 4.8 - 10.8 K/uL    RBC 3.74 (L) 4.20 - 5.40 M/uL    Hemoglobin 11.2 (L) 12.0 - 16.0 g/dL    Hematocrit 34.1 (L) 37.0 - 47.0 %    MCV 91.2 81.4 - 97.8 fL    MCH 29.9 27.0 - 33.0 pg    MCHC 32.8 (L) 33.6 - 35.0 g/dL    RDW 51.0 (H) 35.9 - 50.0 fL    Platelet Count 273 164 - 446 K/uL    MPV 11.2 9.0 - 12.9 fL    Neutrophils-Polys 66.80 44.00 - 72.00 %    Lymphocytes 20.90 (L) 22.00 - 41.00 %    Monocytes 10.00 0.00 - 13.40 %    Eosinophils 1.30 0.00 - 6.90 %    Basophils 0.50 0.00 - 1.80 %    Immature Granulocytes 0.50 0.00 - 0.90 %    Nucleated RBC 0.20 /100 WBC    Neutrophils (Absolute) 11.28 (H) 2.00 - 7.15 K/uL    Lymphs (Absolute) 3.54 1.00 - 4.80 K/uL    Monos (Absolute) 1.69 (H) 0.00 - 0.85 K/uL    Eos (Absolute) 0.22 0.00 - 0.51 K/uL    Baso (Absolute) 0.09 0.00 - 0.12 K/uL    Immature Granulocytes (abs) 0.09 0.00 - 0.11 K/uL    NRBC (Absolute) 0.04 K/uL   BASIC METABOLIC PANEL   Result Value Ref Range    Sodium 136 135 - 145 mmol/L    Potassium 4.0 3.6 - 5.5 mmol/L    Chloride 108 96 - 112 mmol/L    Co2 23 20 - 33 mmol/L    Glucose 117 (H) 65 - 99 mg/dL    Bun 10 8 - 22 mg/dL    Creatinine 0.71 0.50 - 1.40 mg/dL    Calcium 8.5 8.5 - 10.5 mg/dL    Anion Gap 5.0 0.0 - 11.9   APTT   Result Value Ref Range    APTT 160.8 () 24.7 - 36.0 sec   PROTHROMBIN TIME   Result Value Ref Range    PT 97.9 () 12.0 - 14.6 sec    INR >=10.00 () 0.87 - 1.13   URINALYSIS   Result Value Ref Range    Micro Urine Req Microscopic     Character Cloudy (A)     Specific Gravity 1.014 <1.035    Ph 5.5 5.0 - 8.0    Glucose Negative Negative mg/dL    Ketones Negative Negative  mg/dL    Protein 30 (A) Negative mg/dL    Bilirubin Negative Negative    Urobilinogen, Urine 0.2 Negative    Nitrite Negative Negative    Leukocyte Esterase Trace (A) Negative    Occult Blood Large (A) Negative    Color Dark Yellow    ESTIMATED GFR   Result Value Ref Range    GFR If African American >60 >60 mL/min/1.73 m 2    GFR If Non African American >60 >60 mL/min/1.73 m 2   URINE MICROSCOPIC (W/UA)   Result Value Ref Range    WBC 2-5 /hpf    RBC >150 (A) /hpf    Bacteria Negative None /hpf    Epithelial Cells Few /hpf    Hyaline Cast 0-2 /lpf   EKG (NOW)   Result Value Ref Range    Report       [facility] Emergency Dept.    Test Date:  2018  Pt Name:    SHABBIR CARMEN               Department: ER  MRN:        [id number]                      Room:       TRAUMA - EXAM 1  Gender:     Female                       Technician: [id number]  :        1965                   Requested By:JESUS ARAUJO  Order #:    [id number]                    Reading MD:    Measurements  Intervals                                Axis  Rate:       67                           P:          50  NV:         156                          QRS:        64  QRSD:       94                           T:          38  QT:         384  QTc:        406    Interpretive Statements  SINUS RHYTHM  No previous ECG available for comparison         All labs reviewed by me.    EKG  12 Lead EKG interpreted by me shows a normal sinus rhythm at a rate of 67. Axis normal. No ST elevations. No T wave inversions. Final impression: normal EKG.    RADIOLOGY  CT-LSPINE W/O PLUS RECONS   Final Result      No lumbar spine fracture or subluxation.      CT-ABDOMEN-PELVIS WITH   Final Result      1.  Small amount of edema or hemorrhage in the RIGHT renal hilum with thickening of the RIGHT renal pelvis concerning for inflammation.   2.  No ureteral stone or hydronephrosis.   3.  Probable hematoma within the musculature posterior medial to RIGHT  hip with findings concerning for arterial hemorrhage in the RIGHT gluteal muscle rupture.   4.  Stenosis of LEFT iliac vein with subcutaneous collaterals.   5.  Probable submucosal fibroid which distorts the endometrial canal.  Mass is not excluded.   6.  Bilateral ovarian cysts.      LE VENOUS DUPLEX (Specify in Comments Left, Right Or Bilateral)   Final Result        The radiologist's interpretation of all radiological studies have been reviewed by me.    COURSE & MEDICAL DECISION MAKING  Pertinent Labs & Imaging studies reviewed. (See chart for details)    6:50 PM - Patient seen and examined at bedside. Patient will be treated with 650 mg Tylenol. Ordered EKG, CBC, BMP, APTT, PTT to evaluate her symptoms. The differential diagnoses include but are not limited to: sciatica, DVT.    9:50 PM Patient reevaluated at beside. She is laying flat on the gurney. Informed her that her INR is high and her hemoglobin levels are low. Explained that she will most likely be admitted to the hospital to monitor her INR levels.     9:52 PM Spoke with pharmacy.    12:24 AM Reevaluated patient at bedside. She is sleeping in her gurney. Patient denies having any recent falls. She has bruising on her right gluteus that she reports has been present on and off due to moving heavy objects. She does not have any dysuria at this time. Informed that she will have to be admitted as she has an arterial bleed and needs to reverse her INR with vitamin K.    12:25 AM Spoke with pharmacy who suggest to give the patient vitamin K.    12:26 AM Paged orthopedics and [facility].    12:30 AM Spoke to Dr. Shelby (Orthopedics) who advised to continue watching her for compartment syndrome. If she does not improve I will need to speak with IR to see if it is embolizable.       Medical Decision Making: At this point time I think the patient's pain is likely secondary to spontaneous hemorrhage in the patient's right gluteal region.  There is no  signs of compartment syndrome at this point time.  Patient is supratherapeutic on her Coumadin but does have a protein S deficiency with multiple clots in the past therefore I will reverse her with oral vitamin K but I do not want to give her FFP as I feel that this is possible to cause her to have a thrombotic event and given the fact that the patient is not having left threatening bleeding the risk of this would outweigh the benefit of fully reversal quickly.  Patient was given IV antibiotics for possible urinary tract infection.  At this point time she will be admitted for further observation.  She may need a CTA if she continues to have worsening of this hematoma but due to the fact this patient just recently had a contrast bolus I do not think it would be appropriate to get a CTA now as this could cause kidney injury.    DISPOSITION:  Patient will be admitted to Dr. Myles ([facility]) in guarded condition.     FINAL IMPRESSION  1. Hematoma of hip, right, initial encounter    2. Medication induced coagulopathy (HCC)    3. Hematoma of right kidney, initial encounter          ITerrie (Claudia), am scribing for, and in the presence of, Ang Vicente M.D.    Electronically signed by: Terrie Asif (Claudia), 8/6/2018    IAng M.D. personally performed the services described in this documentation, as scribed by Terrie Asif in my presence, and it is both accurate and complete.    The note accurately reflects work and decisions made by me.  Ang Vicente  8/7/2018  2:24 AM

## 2018-08-07 NOTE — ED NOTES
tech from Lab called with critical result of pt 97.7, INR >10, aptt 168.8  at 2110. Critical lab result read back to Tech.   Dr. Vicente notified of critical lab result at 2112.  Critical lab result read back by Dr. Vicente.

## 2018-08-07 NOTE — PROGRESS NOTES
Alverto from lab called critical labs in for PT of 93.2 and an INR greater than 10.  Paged [facility].  MD Bond returned paged and said continue to monitor and if patient becomes hemodynamically unstable to contact MD at that time.

## 2018-08-07 NOTE — PROGRESS NOTES
Report received from night rn. Patient c/o right sided body pain. Patient NPO for possible embolization today. Patient ambulated to the bathroom with minimal assist using FWW. Vitals stable. No complaints of dizziness/ sob/ cp. Will give mephyton today for abnormal lab level. Bed in low locked position, call bell within reach. Continue to monitor.

## 2018-08-07 NOTE — H&P
Internal Medicine Admitting History and Physical    Note Author: Luke Bond M.D.       Name Nadia Lazaro     1965   Age/Sex 53 y.o. female   MRN [id number]   Code Status DNAR/DNI     After 5PM or if no immediate response to page, please call for cross-coverage  Attending/Team: Vladimir Hernandez See Patient List for primary contact information  Call [phone] to page    1st Call - Day Intern (R1):   Samantha 2nd Call - Day Sr. Resident (R2/R3):   Cailin       Chief Complaint:   R Hip Hematoma    HPI:  The patient is a 53 year old female with PMHx Protein S and C deficiency on warfarin s/p 11 DVT's, Multiple Sclerosis, back spasms on intrathecal baclofen, and depression who presents to ED with worsening R hip pain.  It started 4 days ago after helping  move boxes. She experienced gradually progressive pain in her right hip, described as fullness that begins in her buttocks and radiates into right calf.  There was associated nausea and vomitting.  When pain limited her ambulation she came to ED to seek care.  Denies abdominal pain, changes in stool, new back pain, dysuria, frequency, chest pain, SOB.  No other provocative/palliative factors. At ED coagulation showed INR>10, PT 98, .  CT abdomen pelvis revealed hematoma within R posterior medial hip.  LE duplex negative for DVT's.  Currenlty pain has improved; she can ambulate with walker, and feels that it relieves some of the pressure in her hips.    The patient has been on warfarin for DVT prophylaxis since she was diagnosed with Protein S and C deficiency at age of 16, and has had 11 DVT's since then.  Her previous INR, 2 weeks ago, was 4.2.  She recently has stopped taking Valium and clonazepam for her anxiety.  Besides that, there have been no acute changes in her diet or medications.  Multiple sclerosis outbreaks usually present as heat intolerance or agitation, not weakness or paresthesias.  The patient has a intrathecal  baclofen pump to treat intractable back spasms.      Review of Systems   Constitutional: Negative for fever.   Eyes: Negative for blurred vision.   Respiratory: Negative for shortness of breath.    Cardiovascular: Negative for chest pain, palpitations and leg swelling.   Gastrointestinal: Positive for nausea and vomiting. Negative for abdominal pain, blood in stool, constipation and diarrhea.   Genitourinary: Negative for dysuria, flank pain and urgency.   Musculoskeletal: Negative for back pain.        Hip pain from buttocks radiating down right calf   Neurological: Negative for sensory change, weakness and headaches.   Endo/Heme/Allergies: Does not bruise/bleed easily.             Past Medical History (Chronic medical problem, known complications and current treatment)    Protein S and C deficiency on chronic wafarin to INR or 3-4  MS treated with Tysabri  Depression on Paroxetine  Back Spasms treated with intrathecal baclofen     Past Surgical History:  Past Surgical History:   Procedure Laterality Date   • PUMP INSERT/REMOVE  10/19/2010    Performed by SOMMER PAREDES at SURGERY [facility]   • PUMP INSERT/REMOVE  8/24/2010    Performed by SOMMER PAREDES at [facility] ORS   • OTHER NEUROLOGICAL SURG         Current Outpatient Medications:  Home Medications     Reviewed by Bibiana Sexton R.N. (Registered Nurse) on 08/06/18 at 1757  Med List Status: Complete   Medication Last Dose Status   acetaminophen (TYLENOL) 325 MG TABS 5/17/2018 Active   BACLOFEN IT 8/6/2018 Active   docosahexanoic acid (FISH OIL) 1000 MG CAPS 5/17/2018 Active   Multiple Vitamin (MULTIVITAMIN PO)  Active   Natalizumab (TYSABRI IV) 7/29/2018 Active   paroxetine (PAXIL) 40 MG tablet 5/17/2018 Active   vitamin D (CHOLECALCIFEROL) 1000 UNIT TABS 8/6/2018 Active   warfarin (COUMADIN) 10 MG Tab  Active   warfarin (COUMADIN) 7.5 MG Tab  Active                Medication Allergy/Sensitivities:  Allergies   Allergen Reactions  "  • Nubain [Nalbuphine Hcl]      Seizures           Family History (mandatory)   Family History   Problem Relation Age of Onset   • Hypertension Unknown    • No Known Problems Mother    • Lung Disease Father         emphysema   • No Known Problems Sister    • No Known Problems Child    • Cancer Neg Hx    • Diabetes Neg Hx        Social History (mandatory)   Social History     Social History   • Marital status:      Spouse name: N/A   • Number of children: N/A   • Years of education: N/A     Occupational History   • Not on file.     Social History Main Topics   • Smoking status: Current Some Day Smoker     Packs/day: 0.25   • Smokeless tobacco: Never Used      Comment: 4 cigs/day, trying to quit   • Alcohol use No   • Drug use: No   • Sexual activity: Not Currently     Other Topics Concern   • Not on file     Social History Narrative   • No narrative on file     Living situation:   PCP : Elana Gillespie D.O.    Physical Exam     Vitals:    08/06/18 2331 08/07/18 0201 08/07/18 0202 08/07/18 0250   BP:    121/71   Pulse: 88  87 82   Resp:  20  18   Temp:    37.3 °C (99.1 °F)   SpO2: 97%  97% 99%   Weight:       Height:         Body mass index is 32.49 kg/m².  /71   Pulse 82   Temp 37.3 °C (99.1 °F)   Resp 18   Ht 1.753 m (5' 9\")   Wt 99.8 kg (220 lb)   SpO2 99%   Breastfeeding? No   BMI 32.49 kg/m²   O2 therapy: Pulse Oximetry: 99 %, O2 Delivery: None (Room Air)    Physical Exam   Constitutional: She is oriented to person, place, and time and well-developed, well-nourished, and in no distress.   HENT:   Head: Normocephalic and atraumatic.   Eyes: EOM are normal.   Neck: Normal range of motion. No tracheal deviation present.   Cardiovascular: Normal rate and regular rhythm.    Pulmonary/Chest: Breath sounds normal. No respiratory distress.   Abdominal: Bowel sounds are normal. She exhibits no distension. There is no tenderness.   Genitourinary:   Genitourinary Comments: Menstrual blood on sheets "   Musculoskeletal: Normal range of motion. She exhibits no edema or tenderness.   R hip:  Not firm or indurated to palpation. sensation intact.  Strength intact.  No paresthesias.   Lower extremity warm to palpation b/l   Neurological: She is alert and oriented to person, place, and time.   Skin: Skin is warm. No rash noted.   Psychiatric: Affect normal.         Data Review       Old Records Request:   Deferred  Current Records review/summary: Completed    Lab Data Review:  Recent Results (from the past 24 hour(s))   EKG (NOW)    Collection Time: 18  7:35 PM   Result Value Ref Range    Report       [facility]    Test Date:  2018  Pt Name:    SHABBIR CARMEN               Department: ER  MRN:        [id number]                      Room:       TRAUMA - EXAM 1  Gender:     Female                       Technician: [id number]  :        1965                   Requested By:JESUS ARAUJO  Order #:    [id number]                    Reading MD:    Measurements  Intervals                                Axis  Rate:       67                           P:          50  TN:         156                          QRS:        64  QRSD:       94                           T:          38  QT:         384  QTc:        406    Interpretive Statements  SINUS RHYTHM  No previous ECG available for comparison     CBC WITH DIFFERENTIAL    Collection Time: 18  7:54 PM   Result Value Ref Range    WBC 16.9 (H) 4.8 - 10.8 K/uL    RBC 3.74 (L) 4.20 - 5.40 M/uL    Hemoglobin 11.2 (L) 12.0 - 16.0 g/dL    Hematocrit 34.1 (L) 37.0 - 47.0 %    MCV 91.2 81.4 - 97.8 fL    MCH 29.9 27.0 - 33.0 pg    MCHC 32.8 (L) 33.6 - 35.0 g/dL    RDW 51.0 (H) 35.9 - 50.0 fL    Platelet Count 273 164 - 446 K/uL    MPV 11.2 9.0 - 12.9 fL    Neutrophils-Polys 66.80 44.00 - 72.00 %    Lymphocytes 20.90 (L) 22.00 - 41.00 %    Monocytes 10.00 0.00 - 13.40 %    Eosinophils 1.30 0.00 - 6.90 %    Basophils 0.50 0.00 - 1.80 %     Immature Granulocytes 0.50 0.00 - 0.90 %    Nucleated RBC 0.20 /100 WBC    Neutrophils (Absolute) 11.28 (H) 2.00 - 7.15 K/uL    Lymphs (Absolute) 3.54 1.00 - 4.80 K/uL    Monos (Absolute) 1.69 (H) 0.00 - 0.85 K/uL    Eos (Absolute) 0.22 0.00 - 0.51 K/uL    Baso (Absolute) 0.09 0.00 - 0.12 K/uL    Immature Granulocytes (abs) 0.09 0.00 - 0.11 K/uL    NRBC (Absolute) 0.04 K/uL   BASIC METABOLIC PANEL    Collection Time: 08/06/18  7:54 PM   Result Value Ref Range    Sodium 136 135 - 145 mmol/L    Potassium 4.0 3.6 - 5.5 mmol/L    Chloride 108 96 - 112 mmol/L    Co2 23 20 - 33 mmol/L    Glucose 117 (H) 65 - 99 mg/dL    Bun 10 8 - 22 mg/dL    Creatinine 0.71 0.50 - 1.40 mg/dL    Calcium 8.5 8.5 - 10.5 mg/dL    Anion Gap 5.0 0.0 - 11.9   APTT    Collection Time: 08/06/18  7:54 PM   Result Value Ref Range    APTT 160.8 () 24.7 - 36.0 sec   PROTHROMBIN TIME    Collection Time: 08/06/18  7:54 PM   Result Value Ref Range    PT 97.9 (HH) 12.0 - 14.6 sec    INR >=10.00 (HH) 0.87 - 1.13   ESTIMATED GFR    Collection Time: 08/06/18  7:54 PM   Result Value Ref Range    GFR If African American >60 >60 mL/min/1.73 m 2    GFR If Non African American >60 >60 mL/min/1.73 m 2   URINALYSIS    Collection Time: 08/06/18  9:21 PM   Result Value Ref Range    Micro Urine Req Microscopic     Character Cloudy (A)     Specific Gravity 1.014 <1.035    Ph 5.5 5.0 - 8.0    Glucose Negative Negative mg/dL    Ketones Negative Negative mg/dL    Protein 30 (A) Negative mg/dL    Bilirubin Negative Negative    Urobilinogen, Urine 0.2 Negative    Nitrite Negative Negative    Leukocyte Esterase Trace (A) Negative    Occult Blood Large (A) Negative    Color Dark Yellow    URINE MICROSCOPIC (W/UA)    Collection Time: 08/06/18  9:21 PM   Result Value Ref Range    WBC 2-5 /hpf    RBC >150 (A) /hpf    Bacteria Negative None /hpf    Epithelial Cells Few /hpf    Hyaline Cast 0-2 /lpf       Imaging/Procedures Review:    Independant Imaging Review:  Completed  CT-LSPINE W/O PLUS RECONS   Final Result      No lumbar spine fracture or subluxation.      CT-ABDOMEN-PELVIS WITH   Final Result      1.  Small amount of edema or hemorrhage in the RIGHT renal hilum with thickening of the RIGHT renal pelvis concerning for inflammation.   2.  No ureteral stone or hydronephrosis.   3.  Probable hematoma within the musculature posterior medial to RIGHT hip with findings concerning for arterial hemorrhage in the RIGHT gluteal muscle rupture.   4.  Stenosis of LEFT iliac vein with subcutaneous collaterals.   5.  Probable submucosal fibroid which distorts the endometrial canal.  Mass is not excluded.   6.  Bilateral ovarian cysts.      LE VENOUS DUPLEX (Specify in Comments Left, Right Or Bilateral)   Final Result               Records reviewed and summarized in current documentation :  Yes  [employer] teaching service handout given to patient:  No         Assessment/Plan     * Hematoma of right hip- (present on admission)   Assessment & Plan    -Hemodynamically stable, no signs of compartment syndrome.  Pain at 6/10.  -CT 8/7/2018: Probable hematoma within the musculature posterior medial to RIGHT hip with findings concerning for arterial hemorrhage in the RIGHT gluteal muscle rupture.  Plan  -CTM signs of compartment syndrome: paresthesias, pain out of proportion, weakness decrease pulses.   -If no improvement, consider IR consult for embolization.   -NPO, sips with medication        Coagulopathy (HCC)   Assessment & Plan    -PT 97.9, .8, INR>10  -Hb 11.2, Hct 34.1  -Patient has been given vitamin K  Plan  -trend Hb/Hct q6 for signs of acute bleeding  -follow coag panel for reversal of coagulopathy  -follow-up TEG study        Chronic anticoagulation- (present on admission)   Assessment & Plan    -chronically on  Warfarin with INR 3-4 at home.  Has had 11 DVT's in past, including one at INR 2.7 befo  -LE duplex negative for DVT's  Plan  -CTM coagulation  -avoid INR less than  3        Protein S deficiency (HCC)- (present on admission)   Assessment & Plan    -since at 16, on chronic anticoagulation        MS (multiple sclerosis) (HCC)- (present on admission)   Assessment & Plan    -On Tysabri infusion [id number]  -baclofen intrathecal pump for back pain              Anticipated Hospital stay:  >2 midnights        Quality Measures  Quality-Core Measures  PCP: Elana Gillespie D.O.

## 2018-08-07 NOTE — PROGRESS NOTES
2 rn skin check:    Patient has several bruising throughout  Bruising Right bottom cheek  Bruising Right side of the upper cheek  Bruising Bilateral legs anterior and posterior  Scarring lower back from old surgery and left lower leg    No other areas of concern

## 2018-08-07 NOTE — PROGRESS NOTES
Med rec updated per pt.   Antibiotics within last 30 days: none  Patient allergies have been reviewed  Comments: waiting for baclofen pump information to be faxed from [facility] office. Left message 2:25pm

## 2018-08-07 NOTE — NON-PROVIDER
"       Internal Medicine Medical Student Note  Note Author: Leighann Jc, Student    Name Nadia Lazaro     1965   Age/Sex 53 y.o. female   MRN [id number]   Code Status Full       Reason for interval visit  (Principal Problem)   Hematoma of right hip    HPI & Interval Problem Daily Status Update  (problem status, last 24 hours, new history, new data )   Ms. Lazaro is a 53 year old female admitted for a hematoma of the right hip. She has a past medical history of protein S deficiency diagnosed at age 16, 11 DVT's in the left leg, and MS with back spasms s/p intrathecal Baclofen pump placement. She had worsening hip pain over the course of 5 days which began after she was helping her  move boxes. She denied any trauma or falls. She noted she experienced some nausea and vomiting, and states she came to the ED when the pain was limiting her ability to walk. CT showed a hematoma in her right posterior medial hip and right renal hilum hemorrhage/edema, and she was found to have an INR of >10. This morning she is in intense 8/10 pain. She describes the pain as \"constant pressure from the inside out that feels like it is pushing through the skin.\" She states the pain goes from the right hip, down the thigh and into the calf. She is able to ambulate to and from the bathroom with a front wheel walker.     Review of Systems   Constitutional: Negative for chills and fever.   HENT: Negative for hearing loss.    Eyes: Negative for blurred vision and double vision.   Respiratory: Negative for cough, hemoptysis and shortness of breath.    Cardiovascular: Negative for chest pain and leg swelling.   Gastrointestinal: Negative for abdominal pain, constipation, diarrhea, nausea and vomiting.   Genitourinary: Negative for dysuria and urgency.   Musculoskeletal: Positive for back pain and myalgias. Negative for falls and joint pain.   Neurological: Negative for dizziness, tingling and headaches. "   Endo/Heme/Allergies: Bruises/bleeds easily.       Physical Exam       Vitals:    08/07/18 0800 08/07/18 1015 08/07/18 1030 08/07/18 1200   BP: 129/80 111/70 136/71 129/70   Pulse: 83 94 95 88   Resp: 16 20 20 16   Temp: 36.7 °C (98 °F) 36.3 °C (97.3 °F) 36.9 °C (98.5 °F) 36.8 °C (98.2 °F)   SpO2: 96% 95% 97% 95%   Weight:       Height:         Body mass index is 32.46 kg/m². Weight: 99.7 kg (219 lb 12.8 oz)  Oxygen Therapy:  Pulse Oximetry: 95 %, O2 (LPM): 0, O2 Delivery: None (Room Air)    Physical Exam   Constitutional: She is oriented to person, place, and time. She appears distressed.   HENT:   Head: Normocephalic and atraumatic.   Mouth/Throat: Oropharynx is clear and moist.   Eyes: Pupils are equal, round, and reactive to light. EOM are normal.   Neck: Normal range of motion. No JVD present.   Cardiovascular: Normal rate, regular rhythm and intact distal pulses.  Exam reveals no gallop and no friction rub.    No murmur heard.  Pulmonary/Chest: Effort normal and breath sounds normal. She has no wheezes. She has no rales.   Abdominal: Soft. Bowel sounds are normal. She exhibits no distension. There is no tenderness. There is no rebound.   Musculoskeletal: Normal range of motion. She exhibits no edema.   Neurological: She is alert and oriented to person, place, and time.   Skin: No pallor.   Bruises on bilateral lower extremities, largest one on the right buttock, and stasis dermatitis scarring from previous DVT's. Skin feels abnormally warm in the right buttock area down the thigh, likely pooling blood        HEMATOLOGY     Ref. Range 5/19/2018 11:42 8/6/2018 19:54 8/7/2018 03:33 8/7/2018 13:31   WBC Latest Ref Range: 4.8 - 10.8 K/uL 11.1 (H) 16.9 (H) 19.1 (H) 16.5 (H)   RBC Latest Ref Range: 4.20 - 5.40 M/uL 4.67 3.74 (L) 3.58 (L) 2.94 (L)   Hemoglobin Latest Ref Range: 12.0 - 16.0 g/dL 14.4 11.2 (L) 10.9 (L) 8.8 (L)   Hematocrit Latest Ref Range: 37.0 - 47.0 % 43.5 34.1 (L) 32.2 (L) 26.6 (L)   MCV Latest Ref  Range: 81.4 - 97.8 fL 93.1 91.2 89.9 90.5   MCH Latest Ref Range: 27.0 - 33.0 pg 30.8 29.9 30.4 29.9   MCHC Latest Ref Range: 33.6 - 35.0 g/dL 33.1 (L) 32.8 (L) 33.9 33.1 (L)   RDW Latest Ref Range: 35.9 - 50.0 fL 49.0 51.0 (H) 50.3 (H) 51.3 (H)   Platelet Count Latest Ref Range: 164 - 446 K/uL 271 273 274 248   MPV Latest Ref Range: 9.0 - 12.9 fL 11.2 11.2 11.1 10.8     COAGULATION:    Ref. Range 6/26/2018 14:55 8/6/2018 19:54 8/7/2018 03:33   PT Latest Ref Range: 12.0 - 14.6 sec  97.9 (HH) 93.2 (HH)   INR Latest Ref Range: 0.87 - 1.13  3.7 >=10.00 (HH) >=10.00 (HH)   APTT Latest Ref Range: 24.7 - 36.0 sec  160.8 (HH)    Reaction Time Initial-R Latest Ref Range: 5.0 - 10.0 min   12.9 (H)   Clot Kinetics-K Latest Ref Range: 1.0 - 3.0 min   2.9   Clot Angle-Angle Latest Ref Range: 53.0 - 72.0 degrees   54.9   Maximum Clot Strength-MA Latest Ref Range: 50.0 - 70.0 mm   70.7 (H)   Lysis 30 minutes-LY30 Latest Ref Range: 0.0 - 8.0 %   0.0         Assessment/Plan     1) Hematoma of hip, emorrhage/edema of R renal hilum  - Both seen on CT yesterday, INR >10, PT 97.9 on presentation to ED   - H&H are trending down from 11.2/34 to 8.8/26.6 this afternoon   - Patient is experiencing severe pain in her right buttock and down the right leg  - Closely monitor H&H and transfuse if Hgb falls below 7  - TEG showed reaction time of 12.9, which is between the threshold of administering 1 unit of FFP and 2 units of FFP-- decided to give her 2 units  - Closely monitor INR as well, goal INR 3-4    2) Supratherapeutic INR and chronic anticoagulation  - INR yesterday and this morning was >10; PT 93.2 down from 97.9 yesterday. Her PTT was 160.8 yesterday and was not rechecked this morning.  - Patient takes warfarin 10 mg on Tuesdays and Saturdays, and 7.5 mg every other day of the week  - She mentioned changing her schedule slightly (taking the 10 mg a day early), but it is unlikely this alone would cause a dramatic increase in the INR  -  Protein S deficiency: patient's doctor set her goal INR for 3-4 (she has had DVT's at INR of 2.7)    3) Multiple Sclerosis  - Patient receives Tysabri IV every 8 weeks  - Has baclofen pump for back muscle spasms   - Patient states her MS is relatively well controlled as long as she stays out of the sun

## 2018-08-07 NOTE — ASSESSMENT & PLAN NOTE
Hx of 11 DVTs.   - Cont INR checks, her baseline is 3-4  - INR 2.09 --> Cont Heparin IV  - Warfarin 10mg given.  - H&H bid  - follow coag panel for reversal of coagulopathy

## 2018-08-07 NOTE — PROGRESS NOTES
Internal Medicine Interval Note  Note Author: Trinity Singh M.D.     Name Nadia Lazaro 1965   Age/Sex 53 y.o. female   MRN [id number]   Code Status DNR/DNI     After 5PM or if no immediate response to page, please call for cross-coverage  Attending/Team: David/Vladimir See Patient List for primary contact information  Call [phone] to page    1st Call - Day Intern (R1):   Samantha 2nd Call - Day Sr. Resident (R2/R3):   Cailin         Reason for interval visit  (Principal Problem)   R hip hematoma      Interval Problem Daily Status Update  (24 hours, problem oriented, brief subjective history, new lab/imaging data pertinent to that problem)   - Significant pain this morning with INR >10 and TEG study indicating need --> given 2U FFP  - Takes warfarin 10mg on T and Sat and 7.5mg on the other days of the week. She says that she did take her 10mg tablet a different day last week in place of her 7.5mg dose.  - Able to ambulate using walker    Review of Systems   Constitutional: Negative.    HENT: Negative.    Eyes: Negative.    Respiratory: Negative.    Cardiovascular: Negative.    Gastrointestinal: Negative.    Genitourinary: Negative.    Musculoskeletal: Negative.    Skin:        Bruising on lateral side of R hip   Neurological: Negative.    Endo/Heme/Allergies: Bruises/bleeds easily.   Psychiatric/Behavioral: Negative.        Disposition/Barriers to discharge:   Need to get to therapeutic INR    Consultants/Specialty    PCP: Elana Gillespie D.O.      Quality Measures  Quality-Core Measures        Physical Exam       Vitals:    18 1030 18 1200 18 1315 18 1554   BP: 136/71 129/70 102/65 (P) 130/74   Pulse: 95 88 89 (P) 89   Resp: 20 16 18 (P) 18   Temp: 36.9 °C (98.5 °F) 36.8 °C (98.2 °F) 36.8 °C (98.3 °F) (P) 37 °C (98.6 °F)   SpO2: 97% 95% 96% (P) 95%   Weight:       Height:         Body mass index is 32.46 kg/m². Weight: 99.7 kg (219 lb 12.8  oz)  Oxygen Therapy:  Pulse Oximetry: (P) 95 %, O2 (LPM): 0, O2 Delivery: None (Room Air)    Physical Exam   Constitutional: She is well-developed, well-nourished, and in no distress.   HENT:   Head: Normocephalic and atraumatic.   Eyes: Pupils are equal, round, and reactive to light. Conjunctivae and EOM are normal.   Neck: Normal range of motion. Neck supple.   Cardiovascular: Normal rate, regular rhythm, normal heart sounds and intact distal pulses.    Pulmonary/Chest: Effort normal and breath sounds normal.   Abdominal: Soft. Bowel sounds are normal.   Musculoskeletal: Normal range of motion.   R hip: warm to palpation along hip and lateral thigh; not firm; sensation intact, normal strength    Distal pedal pulses intact b/l, no pallor     Skin: Skin is warm.     Lab Results   Component Value Date/Time    WBC 16.4 (H) 08/07/2018 02:37 PM    RBC 2.87 (L) 08/07/2018 02:37 PM    HEMOGLOBIN 8.9 (L) 08/07/2018 02:37 PM    HEMATOCRIT 25.7 (L) 08/07/2018 02:37 PM    MCV 89.5 08/07/2018 02:37 PM    MCH 31.0 08/07/2018 02:37 PM    MCHC 34.6 08/07/2018 02:37 PM    MPV 11.0 08/07/2018 02:37 PM    NEUTSPOLYS 66.80 08/06/2018 07:54 PM    LYMPHOCYTES 20.90 (L) 08/06/2018 07:54 PM    MONOCYTES 10.00 08/06/2018 07:54 PM    EOSINOPHILS 1.30 08/06/2018 07:54 PM    BASOPHILS 0.50 08/06/2018 07:54 PM      Lab Results   Component Value Date/Time    PROTHROMBTM 43.3 (H) 08/07/2018 02:37 PM    INR 4.60 (H) 08/07/2018 02:37 PM          Assessment/Plan     * Hematoma of right hip- (present on admission)   Assessment & Plan    Though she does not remember any specific trauma to the area, as she was moving heavy boxes around definite possibility that she might have hit her hip inadvertently.  - Hemodynamically stable, no signs of compartment syndrome.  Pain at 7/10.  -CT 8/7/2018: Probable hematoma within the musculature posterior medial to R hip with findings concerning for arterial hemorrhage in the R gluteal muscle.  Plan:  - 2U FFP  given  -CTM signs of compartment syndrome: paresthesias, pain out of proportion, weakness, decreased pulses.   -If no improvement, consider IR consult for embolization.   -NPO        Anemia due to acute blood loss- (present on admission)   Assessment & Plan    Hb 10.9 --> 8.9. Most likely d/t acute blood loss. Hemodynamically stable.  - H&H q6h  - Would consider IR consult if no improvement        Warfarin toxicity, accidental or unintentional, initial encounter- (present on admission)   Assessment & Plan    Took a 10mg tablet instead of a 7.5mg tablet recently. This may have contributed to her acute bleeding.  - Cont INR checks, her baseline is 3-4 but we are willing to push it as priority is controlling her bleed.        Warfarin-induced coagulopathy (HCC)- (present on admission)   Assessment & Plan    -chronically on  Warfarin with INR 3-4 at home.  Has had 11 DVT's in past, including one at INR 2.7 before.  -LE duplex negative for DVTs  - Had IVC filter in place from [id number]. Had it removed last year as it had perforated too high.  Plan:  -CTM coagulation        Coagulopathy (HCC)   Assessment & Plan    -PT 97.9, .8, INR>10  -Hb 11.2, Hct 34.1  - Vitamin K and 2U FFP given  Plan:  -H&H bid  -follow coag panel for reversal of coagulopathy        Protein S deficiency (HCC)- (present on admission)   Assessment & Plan    Diagnosed at age 16, on chronic anticoagulation        MS (multiple sclerosis) (HCC)- (present on admission)   Assessment & Plan    -On Tysabri infusion [id number]  -baclofen intrathecal pump for back pain

## 2018-08-07 NOTE — ED NOTES
Reports pins and needling in right lower leg and foot. Reports pain radiating down buttock into calf.

## 2018-08-08 LAB
ALBUMIN SERPL BCP-MCNC: 3.3 G/DL (ref 3.2–4.9)
ALBUMIN/GLOB SERPL: 1.4 G/DL
ALP SERPL-CCNC: 53 U/L (ref 30–99)
ALT SERPL-CCNC: 9 U/L (ref 2–50)
ANION GAP SERPL CALC-SCNC: 8 MMOL/L (ref 0–11.9)
AST SERPL-CCNC: 11 U/L (ref 12–45)
BASOPHILS # BLD AUTO: 0.3 % (ref 0–1.8)
BASOPHILS # BLD: 0.05 K/UL (ref 0–0.12)
BILIRUB SERPL-MCNC: 0.7 MG/DL (ref 0.1–1.5)
BUN SERPL-MCNC: 9 MG/DL (ref 8–22)
CALCIUM SERPL-MCNC: 8.1 MG/DL (ref 8.5–10.5)
CHLORIDE SERPL-SCNC: 110 MMOL/L (ref 96–112)
CO2 SERPL-SCNC: 22 MMOL/L (ref 20–33)
CREAT SERPL-MCNC: 0.65 MG/DL (ref 0.5–1.4)
EKG IMPRESSION: NORMAL
EOSINOPHIL # BLD AUTO: 0.25 K/UL (ref 0–0.51)
EOSINOPHIL NFR BLD: 1.7 % (ref 0–6.9)
ERYTHROCYTE [DISTWIDTH] IN BLOOD BY AUTOMATED COUNT: 49.4 FL (ref 35.9–50)
ERYTHROCYTE [DISTWIDTH] IN BLOOD BY AUTOMATED COUNT: 49.6 FL (ref 35.9–50)
ERYTHROCYTE [DISTWIDTH] IN BLOOD BY AUTOMATED COUNT: 50.4 FL (ref 35.9–50)
ERYTHROCYTE [DISTWIDTH] IN BLOOD BY AUTOMATED COUNT: 52.2 FL (ref 35.9–50)
GLOBULIN SER CALC-MCNC: 2.3 G/DL (ref 1.9–3.5)
GLUCOSE SERPL-MCNC: 113 MG/DL (ref 65–99)
HCT VFR BLD AUTO: 23.9 % (ref 37–47)
HCT VFR BLD AUTO: 24 % (ref 37–47)
HCT VFR BLD AUTO: 24.1 % (ref 37–47)
HCT VFR BLD AUTO: 24.4 % (ref 37–47)
HGB BLD-MCNC: 8.1 G/DL (ref 12–16)
HGB BLD-MCNC: 8.1 G/DL (ref 12–16)
HGB BLD-MCNC: 8.2 G/DL (ref 12–16)
HGB BLD-MCNC: 8.3 G/DL (ref 12–16)
IMM GRANULOCYTES # BLD AUTO: 0.1 K/UL (ref 0–0.11)
IMM GRANULOCYTES NFR BLD AUTO: 0.7 % (ref 0–0.9)
INR PPP: 3.97 (ref 0.87–1.13)
LYMPHOCYTES # BLD AUTO: 3.56 K/UL (ref 1–4.8)
LYMPHOCYTES NFR BLD: 24.3 % (ref 22–41)
MCH RBC QN AUTO: 30.7 PG (ref 27–33)
MCH RBC QN AUTO: 31.3 PG (ref 27–33)
MCHC RBC AUTO-ENTMCNC: 33.2 G/DL (ref 33.6–35)
MCHC RBC AUTO-ENTMCNC: 33.9 G/DL (ref 33.6–35)
MCHC RBC AUTO-ENTMCNC: 34 G/DL (ref 33.6–35)
MCHC RBC AUTO-ENTMCNC: 34.6 G/DL (ref 33.6–35)
MCV RBC AUTO: 90.3 FL (ref 81.4–97.8)
MCV RBC AUTO: 90.5 FL (ref 81.4–97.8)
MCV RBC AUTO: 90.6 FL (ref 81.4–97.8)
MCV RBC AUTO: 92.4 FL (ref 81.4–97.8)
MONOCYTES # BLD AUTO: 1.71 K/UL (ref 0–0.85)
MONOCYTES NFR BLD AUTO: 11.7 % (ref 0–13.4)
NEUTROPHILS # BLD AUTO: 8.98 K/UL (ref 2–7.15)
NEUTROPHILS NFR BLD: 61.3 % (ref 44–72)
NRBC # BLD AUTO: 0.03 K/UL
NRBC BLD-RTO: 0.2 /100 WBC
PLATELET # BLD AUTO: 240 K/UL (ref 164–446)
PLATELET # BLD AUTO: 246 K/UL (ref 164–446)
PLATELET # BLD AUTO: 249 K/UL (ref 164–446)
PLATELET # BLD AUTO: 272 K/UL (ref 164–446)
PMV BLD AUTO: 10.4 FL (ref 9–12.9)
PMV BLD AUTO: 10.7 FL (ref 9–12.9)
PMV BLD AUTO: 10.9 FL (ref 9–12.9)
PMV BLD AUTO: 11 FL (ref 9–12.9)
POTASSIUM SERPL-SCNC: 3.8 MMOL/L (ref 3.6–5.5)
PROT SERPL-MCNC: 5.6 G/DL (ref 6–8.2)
PROTHROMBIN TIME: 38.5 SEC (ref 12–14.6)
RBC # BLD AUTO: 2.64 M/UL (ref 4.2–5.4)
RBC # BLD AUTO: 2.64 M/UL (ref 4.2–5.4)
RBC # BLD AUTO: 2.65 M/UL (ref 4.2–5.4)
RBC # BLD AUTO: 2.67 M/UL (ref 4.2–5.4)
SODIUM SERPL-SCNC: 140 MMOL/L (ref 135–145)
WBC # BLD AUTO: 14.7 K/UL (ref 4.8–10.8)
WBC # BLD AUTO: 14.7 K/UL (ref 4.8–10.8)
WBC # BLD AUTO: 15.2 K/UL (ref 4.8–10.8)
WBC # BLD AUTO: 17.4 K/UL (ref 4.8–10.8)

## 2018-08-08 PROCEDURE — 700105 HCHG RX REV CODE 258: Performed by: STUDENT IN AN ORGANIZED HEALTH CARE EDUCATION/TRAINING PROGRAM

## 2018-08-08 PROCEDURE — A9270 NON-COVERED ITEM OR SERVICE: HCPCS | Performed by: STUDENT IN AN ORGANIZED HEALTH CARE EDUCATION/TRAINING PROGRAM

## 2018-08-08 PROCEDURE — 770020 HCHG ROOM/CARE - TELE (206)

## 2018-08-08 PROCEDURE — 36415 COLL VENOUS BLD VENIPUNCTURE: CPT

## 2018-08-08 PROCEDURE — 99232 SBSQ HOSP IP/OBS MODERATE 35: CPT | Mod: GC | Performed by: HOSPITALIST

## 2018-08-08 PROCEDURE — 700102 HCHG RX REV CODE 250 W/ 637 OVERRIDE(OP): Performed by: STUDENT IN AN ORGANIZED HEALTH CARE EDUCATION/TRAINING PROGRAM

## 2018-08-08 PROCEDURE — 85027 COMPLETE CBC AUTOMATED: CPT

## 2018-08-08 RX ADMIN — SODIUM CHLORIDE: 9 INJECTION, SOLUTION INTRAVENOUS at 02:22

## 2018-08-08 RX ADMIN — SODIUM CHLORIDE: 9 INJECTION, SOLUTION INTRAVENOUS at 17:13

## 2018-08-08 RX ADMIN — Medication 2 TABLET: at 05:32

## 2018-08-08 RX ADMIN — VITAMIN D, TAB 1000IU (100/BT) 1000 UNITS: 25 TAB at 05:32

## 2018-08-08 RX ADMIN — VITAMIN D, TAB 1000IU (100/BT) 1000 UNITS: 25 TAB at 17:11

## 2018-08-08 ASSESSMENT — ENCOUNTER SYMPTOMS
FEVER: 0
TINGLING: 0
DIARRHEA: 0
MUSCULOSKELETAL NEGATIVE: 1
SPUTUM PRODUCTION: 0
BRUISES/BLEEDS EASILY: 1
ABDOMINAL PAIN: 0
CARDIOVASCULAR NEGATIVE: 1
GASTROINTESTINAL NEGATIVE: 1
NEUROLOGICAL NEGATIVE: 1
CONSTIPATION: 0
HEARTBURN: 0
RESPIRATORY NEGATIVE: 1
LOSS OF CONSCIOUSNESS: 0
CHILLS: 0
EYES NEGATIVE: 1
DIZZINESS: 1
NAUSEA: 0
PALPITATIONS: 0
HEADACHES: 0
PSYCHIATRIC NEGATIVE: 1
CONSTITUTIONAL NEGATIVE: 1
ORTHOPNEA: 0
VOMITING: 0
FOCAL WEAKNESS: 0
WEAKNESS: 0
SHORTNESS OF BREATH: 0
COUGH: 0

## 2018-08-08 ASSESSMENT — PAIN SCALES - GENERAL
PAINLEVEL_OUTOF10: 0
PAINLEVEL_OUTOF10: 3

## 2018-08-08 ASSESSMENT — PATIENT HEALTH QUESTIONNAIRE - PHQ9
1. LITTLE INTEREST OR PLEASURE IN DOING THINGS: NOT AT ALL
2. FEELING DOWN, DEPRESSED, IRRITABLE, OR HOPELESS: NOT AT ALL
SUM OF ALL RESPONSES TO PHQ9 QUESTIONS 1 AND 2: 0

## 2018-08-08 NOTE — CARE PLAN
Problem: Safety  Goal: Will remain free from injury  Outcome: PROGRESSING AS EXPECTED  Safety precautions in place. Pt educated to call for assistance.       Problem: Infection  Goal: Will remain free from infection  Outcome: PROGRESSING AS EXPECTED  Pt and family educate on hand hygiene and general self care

## 2018-08-08 NOTE — CARE PLAN
Problem: Safety  Goal: Will remain free from falls    Intervention: Assess risk factors for falls  Fall risk assessed and patient educated to call for assistance when needed.      Problem: Infection  Goal: Will remain free from infection    Intervention: Implement standard precautions and perform hand washing before and after patient contact  Hand hygiene performed before and after patient contact.

## 2018-08-08 NOTE — PROGRESS NOTES
Bedside report received, assumed care of patient. Assessment performed.  Discussed plan of care with pt.  Call light within reach, pt educated to call for assistance.

## 2018-08-08 NOTE — NON-PROVIDER
Internal Medicine Medical Student Note  Note Author: Leighann Jc, Student    Name Nadia Lazaro     1965   Age/Sex 53 y.o. female   MRN [id number]   Code Status Full Code             Reason for interval visit  (Principal Problem)   Hematoma of right hip    Interval Problem Daily Status Update  (problem status, last 24 hours, new history, new data )   This morning Ms. Lazaro is still in pain- at rest it is 2/10 and when she tries to move her right leg it becomes a 6/10 in severity. The pain is still localized to the posterior right leg as well as new inguinal pain. Yesterday the pain went all the way down her calf, but today she says it stops at the knee so it is somewhat improved. She is unable to lay on either side due to pain, so she has been sleeping on her back. She is not using the walker to ambulate anymore, she gets up and goes to and from the bathroom with minimal assistance from the IV pole. She denies any lightheadedness when she stands up.She is pleased to know that her INR is down to 3.97 this morning, and she was concerned about staying NPO for much longer as she has not eaten anything since .     Review of Systems   Constitutional: Negative for chills and fever.   HENT: Negative.    Eyes: Negative.    Respiratory: Negative for cough, sputum production and shortness of breath.    Cardiovascular: Negative for chest pain, palpitations, orthopnea and leg swelling.   Gastrointestinal: Negative for abdominal pain, constipation, diarrhea, heartburn, nausea and vomiting.   Genitourinary: Negative for dysuria, frequency and urgency.   Skin: Negative.    Neurological: Positive for dizziness. Negative for tingling, focal weakness, loss of consciousness, weakness and headaches.        Constant dizziness due to MS- not a new/acute issue   Endo/Heme/Allergies: Bruises/bleeds easily.       Physical Exam       Vitals:    18 0000 18 0400 18 0800 18 1200   BP:  108/60 114/63 126/80 100/69   Pulse: 100 84 94 (!) 102   Resp: 18 18 18 16   Temp: 37.3 °C (99.1 °F) 37.5 °C (99.5 °F) 36.8 °C (98.2 °F) 36.4 °C (97.6 °F)   SpO2: 95% 96% 95% 99%   Weight:       Height:         Body mass index is 32.36 kg/m². Weight: 99.4 kg (219 lb 2.2 oz)  Oxygen Therapy:  Pulse Oximetry: 99 %, O2 (LPM): 0, O2 Delivery: None (Room Air)    Physical Exam   Constitutional: She is oriented to person, place, and time and well-developed, well-nourished, and in no distress.   HENT:   Head: Normocephalic and atraumatic.   Mouth/Throat: Oropharynx is clear and moist. No oropharyngeal exudate.   Eyes: Pupils are equal, round, and reactive to light. EOM are normal.   Neck: Normal range of motion. No JVD present.   Cardiovascular: Regular rhythm, S1 normal and S2 normal.  Tachycardia present.  Exam reveals no gallop and no friction rub.    No murmur heard.  Pulmonary/Chest: Effort normal. She has no wheezes. She has no rales.   Abdominal: Soft. Bowel sounds are normal. She exhibits no distension. There is no tenderness. There is no rebound.   Musculoskeletal: Normal range of motion. She exhibits no edema.        Right upper leg: She exhibits tenderness.   Bruising more prominent today- larger areas of ecchymosis on right buttock, right posterior thigh and behind the right knee. Distal pulses intact, no pallor of right foot. Tenderness extends to back of knee   Neurological: She is alert and oriented to person, place, and time. No cranial nerve deficit.   Skin: Skin is warm and dry. No erythema.     Labs  Hematology   Ref. Range 8/7/2018 13:31 8/7/2018 14:37 8/7/2018 23:57 8/7/2018 23:57 8/8/2018 05:46   WBC Latest Ref Range: 4.8 - 10.8 K/uL 16.5 (H) 16.4 (H) 14.7 (H) 14.7 (H) 15.2 (H)   RBC Latest Ref Range: 4.20 - 5.40 M/uL 2.94 (L) 2.87 (L) 2.67 (L) 2.65 (L) 2.64 (L)   Hemoglobin Latest Ref Range: 12.0 - 16.0 g/dL 8.8 (L) 8.9 (L) 8.2 (L) 8.3 (L) 8.1 (L)   Hematocrit Latest Ref Range: 37.0 - 47.0 % 26.6 (L)  25.7 (L) 24.1 (L) 24.0 (L) 23.9 (L)   MCV Latest Ref Range: 81.4 - 97.8 fL 90.5 89.5 90.3 90.6 90.5   MCH Latest Ref Range: 27.0 - 33.0 pg 29.9 31.0 30.7 31.3 30.7   MCHC Latest Ref Range: 33.6 - 35.0 g/dL 33.1 (L) 34.6 34.0 34.6 33.9   RDW Latest Ref Range: 35.9 - 50.0 fL 51.3 (H) 49.7 49.4 49.6 50.4 (H)   Platelet Count Latest Ref Range: 164 - 446 K/uL 248 249 240 246 249   MPV Latest Ref Range: 9.0 - 12.9 fL 10.8 11.0 10.7 11.0 10.9     Coagulation   Ref. Range 6/26/2018 14:55 8/6/2018 19:54 8/7/2018 03:33 8/7/2018 14:37 8/7/2018 23:57   PT Latest Ref Range: 12.0 - 14.6 sec  97.9 (HH) 93.2 (HH) 43.3 (H) 38.5 (H)   INR Latest Ref Range: 0.87 - 1.13  3.7 >=10.00 (HH) >=10.00 (HH) 4.60 (H) 3.97 (H)     Assessment/Plan     1) Hematoma of hip, hemorrhage/edema of R renal hilum  - Both seen on CT on 8/6, INR >10, PT 97.9 on presentation to ED   - H&H were originally trending down from 11.2/34 to 8.8/26.6, but have stabilized over the past 24 hours  - It is possible that the bleeding has slowed down or even stopped  - Patient is still experiencing pain in her right buttock and down the right leg but the pain no longer extends into her calf, and the bruising was more prominent this morning upon physical exam  - TEG showed reaction time of 12.9, which is between the threshold of administering 1 unit of FFP and 2 units of FFP-- decided to give her 2 units FFP after the 5 mg PO vitamin K she got in the ED-- INR is now 3.97   - Closely monitor H&H and transfuse if Hgb falls below 7, get H&H q8 hours  - Will NOT start anticoagulation yet-- she will stay here for now until we can safely put her back on her anticoagulation and she is stable, in less pain.   - Closely monitor INR as well, goal INR 3-4  - It is unlikely we will need IR to do an embolization of the bleed at this point, so patient will be taken off NPO      2) Supratherapeutic INR and chronic anticoagulation  - INR 8/6 and 8/7 in the morning was >10; PT 93.2 down  "from 97.9. Her PTT was 160.8 Monday and has not been rechecked since  - Yesterday afternoon INR was 4.6, this morning it was 3.97  - Patient takes warfarin 10 mg on Tuesdays and Saturdays, and 7.5 mg every other day of the week  - She mentioned changing her schedule slightly (taking the 10 mg a day early), it is possible this could play a role in the dramatic increase in the INR  - Protein S deficiency: patient's doctor set her goal INR for 3-4 (she has had DVT's at INR of 2.7)     3) Multiple Sclerosis  - Patient receives Tysabri IV every 8 weeks  - Has baclofen pump for back muscle spasms   - Patient states her MS is relatively well controlled as long as she stays out of the sun  - She also states that her right leg is her \"MS leg\" and her left leg is her \"DVT leg,\" so there is constant burning pain in the right leg which makes it difficult to determine if she is feeling numbness or tingling in the right leg as of now.    Parts of assessment and plan copied from my previous note  "

## 2018-08-08 NOTE — PROGRESS NOTES
"Bedside report received. Pt care assumed. Pt resting comfortably. Pt not in distress. AOx 4. Pt complains of rt groin pain that she states \"feels like my previous DVTs\", MD notified. No other complaints at this time. Tele monitor on. Safety precautions in place. Educated to call for assistance.    "

## 2018-08-09 ENCOUNTER — APPOINTMENT (OUTPATIENT)
Dept: RADIOLOGY | Facility: MEDICAL CENTER | Age: 53
DRG: 813 | End: 2018-08-09
Attending: STUDENT IN AN ORGANIZED HEALTH CARE EDUCATION/TRAINING PROGRAM
Payer: MEDICARE

## 2018-08-09 LAB
ABO GROUP BLD: NORMAL
ALBUMIN SERPL BCP-MCNC: 3.1 G/DL (ref 3.2–4.9)
ALBUMIN/GLOB SERPL: 1.4 G/DL
ALP SERPL-CCNC: 49 U/L (ref 30–99)
ALT SERPL-CCNC: 7 U/L (ref 2–50)
ANION GAP SERPL CALC-SCNC: 5 MMOL/L (ref 0–11.9)
AST SERPL-CCNC: 9 U/L (ref 12–45)
BARCODED ABORH UBTYP: 5100
BARCODED PRD CODE UBPRD: NORMAL
BARCODED UNIT NUM UBUNT: NORMAL
BASOPHILS # BLD AUTO: 0.6 % (ref 0–1.8)
BASOPHILS # BLD: 0.08 K/UL (ref 0–0.12)
BILIRUB SERPL-MCNC: 0.6 MG/DL (ref 0.1–1.5)
BLD GP AB SCN SERPL QL: NORMAL
BUN SERPL-MCNC: 10 MG/DL (ref 8–22)
CALCIUM SERPL-MCNC: 7.7 MG/DL (ref 8.5–10.5)
CHLORIDE SERPL-SCNC: 111 MMOL/L (ref 96–112)
CO2 SERPL-SCNC: 21 MMOL/L (ref 20–33)
COMPONENT R 8504R: NORMAL
CREAT SERPL-MCNC: 0.66 MG/DL (ref 0.5–1.4)
EOSINOPHIL # BLD AUTO: 0.42 K/UL (ref 0–0.51)
EOSINOPHIL NFR BLD: 3.1 % (ref 0–6.9)
ERYTHROCYTE [DISTWIDTH] IN BLOOD BY AUTOMATED COUNT: 51.6 FL (ref 35.9–50)
ERYTHROCYTE [DISTWIDTH] IN BLOOD BY AUTOMATED COUNT: 53.7 FL (ref 35.9–50)
FERRITIN SERPL-MCNC: 55.9 NG/ML (ref 10–291)
GLOBULIN SER CALC-MCNC: 2.2 G/DL (ref 1.9–3.5)
GLUCOSE SERPL-MCNC: 107 MG/DL (ref 65–99)
HCT VFR BLD AUTO: 22.7 % (ref 37–47)
HCT VFR BLD AUTO: 25.1 % (ref 37–47)
HGB BLD-MCNC: 7.4 G/DL (ref 12–16)
HGB BLD-MCNC: 8.6 G/DL (ref 12–16)
IMM GRANULOCYTES # BLD AUTO: 0.1 K/UL (ref 0–0.11)
IMM GRANULOCYTES NFR BLD AUTO: 0.7 % (ref 0–0.9)
INR PPP: 4.04 (ref 0.87–1.13)
IRON SATN MFR SERPL: 5 % (ref 15–55)
IRON SERPL-MCNC: 15 UG/DL (ref 40–170)
LYMPHOCYTES # BLD AUTO: 3.59 K/UL (ref 1–4.8)
LYMPHOCYTES NFR BLD: 26.1 % (ref 22–41)
MCH RBC QN AUTO: 30.1 PG (ref 27–33)
MCH RBC QN AUTO: 30.8 PG (ref 27–33)
MCHC RBC AUTO-ENTMCNC: 32.6 G/DL (ref 33.6–35)
MCHC RBC AUTO-ENTMCNC: 34.3 G/DL (ref 33.6–35)
MCV RBC AUTO: 90 FL (ref 81.4–97.8)
MCV RBC AUTO: 92.3 FL (ref 81.4–97.8)
MONOCYTES # BLD AUTO: 1.49 K/UL (ref 0–0.85)
MONOCYTES NFR BLD AUTO: 10.8 % (ref 0–13.4)
NEUTROPHILS # BLD AUTO: 8.07 K/UL (ref 2–7.15)
NEUTROPHILS NFR BLD: 58.7 % (ref 44–72)
NRBC # BLD AUTO: 0.06 K/UL
NRBC BLD-RTO: 0.4 /100 WBC
PLATELET # BLD AUTO: 271 K/UL (ref 164–446)
PLATELET # BLD AUTO: 286 K/UL (ref 164–446)
PMV BLD AUTO: 10.4 FL (ref 9–12.9)
PMV BLD AUTO: 10.5 FL (ref 9–12.9)
POTASSIUM SERPL-SCNC: 3.6 MMOL/L (ref 3.6–5.5)
PRODUCT TYPE UPROD: NORMAL
PROT SERPL-MCNC: 5.3 G/DL (ref 6–8.2)
PROTHROMBIN TIME: 39.1 SEC (ref 12–14.6)
RBC # BLD AUTO: 2.46 M/UL (ref 4.2–5.4)
RBC # BLD AUTO: 2.79 M/UL (ref 4.2–5.4)
RH BLD: NORMAL
SODIUM SERPL-SCNC: 137 MMOL/L (ref 135–145)
TIBC SERPL-MCNC: 279 UG/DL (ref 250–450)
UNIT STATUS USTAT: NORMAL
WBC # BLD AUTO: 12.8 K/UL (ref 4.8–10.8)
WBC # BLD AUTO: 13.8 K/UL (ref 4.8–10.8)

## 2018-08-09 PROCEDURE — 74176 CT ABD & PELVIS W/O CONTRAST: CPT

## 2018-08-09 PROCEDURE — 700105 HCHG RX REV CODE 258

## 2018-08-09 PROCEDURE — 99233 SBSQ HOSP IP/OBS HIGH 50: CPT | Mod: GC | Performed by: HOSPITALIST

## 2018-08-09 PROCEDURE — 700105 HCHG RX REV CODE 258: Performed by: INTERNAL MEDICINE

## 2018-08-09 PROCEDURE — 85027 COMPLETE CBC AUTOMATED: CPT

## 2018-08-09 PROCEDURE — 86923 COMPATIBILITY TEST ELECTRIC: CPT

## 2018-08-09 PROCEDURE — 30233N1 TRANSFUSION OF NONAUTOLOGOUS RED BLOOD CELLS INTO PERIPHERAL VEIN, PERCUTANEOUS APPROACH: ICD-10-PCS | Performed by: HOSPITALIST

## 2018-08-09 PROCEDURE — 700102 HCHG RX REV CODE 250 W/ 637 OVERRIDE(OP): Performed by: STUDENT IN AN ORGANIZED HEALTH CARE EDUCATION/TRAINING PROGRAM

## 2018-08-09 PROCEDURE — 83550 IRON BINDING TEST: CPT

## 2018-08-09 PROCEDURE — P9016 RBC LEUKOCYTES REDUCED: HCPCS

## 2018-08-09 PROCEDURE — 85025 COMPLETE CBC W/AUTO DIFF WBC: CPT

## 2018-08-09 PROCEDURE — A9270 NON-COVERED ITEM OR SERVICE: HCPCS | Performed by: STUDENT IN AN ORGANIZED HEALTH CARE EDUCATION/TRAINING PROGRAM

## 2018-08-09 PROCEDURE — 700111 HCHG RX REV CODE 636 W/ 250 OVERRIDE (IP): Performed by: STUDENT IN AN ORGANIZED HEALTH CARE EDUCATION/TRAINING PROGRAM

## 2018-08-09 PROCEDURE — 36430 TRANSFUSION BLD/BLD COMPNT: CPT

## 2018-08-09 PROCEDURE — 82728 ASSAY OF FERRITIN: CPT

## 2018-08-09 PROCEDURE — 85610 PROTHROMBIN TIME: CPT

## 2018-08-09 PROCEDURE — 83540 ASSAY OF IRON: CPT

## 2018-08-09 PROCEDURE — 36415 COLL VENOUS BLD VENIPUNCTURE: CPT

## 2018-08-09 PROCEDURE — 80053 COMPREHEN METABOLIC PANEL: CPT

## 2018-08-09 PROCEDURE — 770020 HCHG ROOM/CARE - TELE (206)

## 2018-08-09 PROCEDURE — 86901 BLOOD TYPING SEROLOGIC RH(D): CPT

## 2018-08-09 PROCEDURE — 86850 RBC ANTIBODY SCREEN: CPT

## 2018-08-09 RX ORDER — PHYTONADIONE 5 MG/1
2.5 TABLET ORAL ONCE
Status: COMPLETED | OUTPATIENT
Start: 2018-08-09 | End: 2018-08-09

## 2018-08-09 RX ORDER — SODIUM CHLORIDE 9 MG/ML
INJECTION, SOLUTION INTRAVENOUS
Status: COMPLETED
Start: 2018-08-09 | End: 2018-08-09

## 2018-08-09 RX ADMIN — IRON SUCROSE 200 MG: 20 INJECTION, SOLUTION INTRAVENOUS at 09:31

## 2018-08-09 RX ADMIN — VITAMIN D, TAB 1000IU (100/BT) 1000 UNITS: 25 TAB at 04:58

## 2018-08-09 RX ADMIN — Medication 2 TABLET: at 04:58

## 2018-08-09 RX ADMIN — VITAMIN D, TAB 1000IU (100/BT) 1000 UNITS: 25 TAB at 17:38

## 2018-08-09 RX ADMIN — SODIUM CHLORIDE: 9 INJECTION, SOLUTION INTRAVENOUS at 04:59

## 2018-08-09 RX ADMIN — SODIUM CHLORIDE 250 ML: 9 INJECTION, SOLUTION INTRAVENOUS at 11:22

## 2018-08-09 RX ADMIN — PHYTONADIONE 2.5 MG: 5 TABLET ORAL at 11:17

## 2018-08-09 ASSESSMENT — ENCOUNTER SYMPTOMS
CONSTITUTIONAL NEGATIVE: 1
CARDIOVASCULAR NEGATIVE: 1
GASTROINTESTINAL NEGATIVE: 1
COUGH: 0
BRUISES/BLEEDS EASILY: 1
BLURRED VISION: 0
MYALGIAS: 0
ORTHOPNEA: 0
PALPITATIONS: 0
HEMOPTYSIS: 0
FEVER: 0
DIARRHEA: 0
EYES NEGATIVE: 1
CONSTIPATION: 0
VOMITING: 0
MUSCULOSKELETAL NEGATIVE: 1
ABDOMINAL PAIN: 0
WEAKNESS: 0
NAUSEA: 0
CHILLS: 0
HEARTBURN: 0
DOUBLE VISION: 0
SHORTNESS OF BREATH: 0
NEUROLOGICAL NEGATIVE: 1
RESPIRATORY NEGATIVE: 1
PSYCHIATRIC NEGATIVE: 1

## 2018-08-09 ASSESSMENT — PAIN SCALES - GENERAL: PAINLEVEL_OUTOF10: 0

## 2018-08-09 NOTE — PROGRESS NOTES
Report received from night rn. No issues overnight. hgb did drop from last lab draw. Vitals stable, patient asymptomatic. Stated pain was improving. No complaints at the moment. Patient aware to give an occult stool sample. Bed in low locked position. Call bell within reach. Continue to monitor.

## 2018-08-09 NOTE — PROGRESS NOTES
PATIENT RESTING IN BED. NO COMPLAINTS. AWAITING FOR CT SCAN. BED IN LOW LOCKED POSITION,CALL BELL WITHIN REACH. CONTINUE TO MONITOR.

## 2018-08-09 NOTE — PROGRESS NOTES
Internal Medicine Interval Note  Note Author: Trinity Singh M.D.     Name Nadia Lazaro     1965   Age/Sex 53 y.o. female   MRN [id number]   Code Status DNR/DNI     After 5PM or if no immediate response to page, please call for cross-coverage  Attending/Team: David/Vldaimir See Patient List for primary contact information  Call [phone] to page    1st Call - Day Intern (R1):   Samantha 2nd Call - Day Sr. Resident (R2/R3):   Cailin         Reason for interval visit  (Principal Problem)   R hip hematoma      Interval Problem Daily Status Update  (24 hours, problem oriented, brief subjective history, new lab/imaging data pertinent to that problem)     - Pain now relegated to behind her R knee, 2/10 when lying down and 6/10 when she first stands up but then decreases. Describes the pain as a pressure sensation.  - INR: 4.04  - no signs of compartment syndrome          Review of Systems   Constitutional: Negative.    HENT: Negative.    Eyes: Negative.    Respiratory: Negative.    Cardiovascular: Negative.    Gastrointestinal: Negative.    Genitourinary: Negative.    Musculoskeletal: Negative.    Skin:        Bruising on lateral side of R hip   Neurological: Negative.    Endo/Heme/Allergies: Bruises/bleeds easily.   Psychiatric/Behavioral: Negative.        Disposition/Barriers to discharge:   Inpatient/ send her home     Consultants/Specialty  None      PCP: Elana Gillespie D.O.      Quality Measures  Quality-Core Measures   Reviewed items::  Labs reviewed, Medications reviewed and Radiology images reviewed  Ocampo catheter::  No Ocampo  DVT: no meds, intramascular bleeding           Physical Exam       Vitals:    18 0546 18 0800 18 1130 18 1145   BP:  (!) 98/61 (!) 96/66 (!) 89/66   Pulse: 84 88 (!) 101 93   Resp:     Temp:  36.4 °C (97.5 °F) 36.4 °C (97.6 °F) 36.7 °C (98 °F)   SpO2:  97% 97% 99%   Weight:       Height:         Body mass index is 32.78  kg/m². Weight: 100.7 kg (222 lb 0.1 oz)  Oxygen Therapy:  Pulse Oximetry: 99 %, O2 (LPM): 0, O2 Delivery: None (Room Air)    Physical Exam   Constitutional: She is oriented to person, place, and time and well-developed, well-nourished, and in no distress.   HENT:   Head: Normocephalic and atraumatic.   Eyes: Pupils are equal, round, and reactive to light. Conjunctivae and EOM are normal.   Neck: Normal range of motion. Neck supple.   Cardiovascular: Normal rate, regular rhythm, normal heart sounds and intact distal pulses.    Pulmonary/Chest: Effort normal and breath sounds normal.   Abdominal: Soft. Bowel sounds are normal.   Musculoskeletal: Normal range of motion.   R hip: warm to palpation along hip and lateral thigh; not firm; sensation intact, normal strength  Distal pedal pulses intact b/l, no pallor     Neurological: She is alert and oriented to person, place, and time. No cranial nerve deficit.   Skin: Skin is warm. No pallor.   Bruises along R lateral hip and thigh, increased compared to 2 days ago     Lab Results   Component Value Date/Time    WBC 13.8 (H) 08/09/2018 06:01 AM    RBC 2.46 (L) 08/09/2018 06:01 AM    HEMOGLOBIN 7.4 (L) 08/09/2018 06:01 AM    HEMATOCRIT 22.7 (L) 08/09/2018 06:01 AM    MCV 92.3 08/09/2018 06:01 AM    MCH 30.1 08/09/2018 06:01 AM    MCHC 32.6 (L) 08/09/2018 06:01 AM    MPV 10.4 08/09/2018 06:01 AM    NEUTSPOLYS 58.70 08/09/2018 06:01 AM    LYMPHOCYTES 26.10 08/09/2018 06:01 AM    MONOCYTES 10.80 08/09/2018 06:01 AM    EOSINOPHILS 3.10 08/09/2018 06:01 AM    BASOPHILS 0.60 08/09/2018 06:01 AM      Lab Results   Component Value Date/Time    PROTHROMBTM 39.1 (H) 08/09/2018 06:01 AM    INR 4.04 (H) 08/09/2018 06:01 AM          Assessment/Plan     * Hematoma of right hip- (present on admission)   Assessment & Plan    - Hemodynamically stable, no signs of compartment syndrome.  Pain at 2/10.  - INR 4.04  - Repeat CT abd/pelvis to reassess hematoma size  - d/c IVF for today as patient  wants to walk the hallways easily, and would like to encourage this. Will restart them when start Heparin, possibly tomorrow.  - CTM signs of compartment syndrome: paresthesias, pain out of proportion, weakness, decreased pulses.   -If no improvement, consider IR consult for embolization.           Anemia due to acute blood loss- (present on admission)   Assessment & Plan    Hb 8.1 -> 7.4. Most likely d/t acute blood loss. However, her baseline Hb is 14.4 so significant drop --> will transfuse 1U RBC  -Hemodynamically stable.  - Fe 15 -> start iron sucrose 200mg IV q5 days        Warfarin toxicity, accidental or unintentional, initial encounter- (present on admission)   Assessment & Plan    Took a 10mg tablet instead of a 7.5mg tablet recently. This may have contributed to her acute bleeding.  - Cont INR checks, her baseline is 3-4  - INR 4.04 today, so given phytonadione 2.5mg        Warfarin-induced coagulopathy (HCC)- (present on admission)   Assessment & Plan    -chronically on  Warfarin with INR 3-4 at home.  Has had 11 DVT's in past, including one at INR 2.7 before.  -LE duplex negative for DVTs  - Had IVC filter in place from 1171-7043. Had it removed last year as it had perforated too high.  -CTM coagulation        Coagulopathy (HCC)   Assessment & Plan    -PT 97.9, .8, INR>10  -Hb 11.2, Hct 34.1  - Vitamin K and 2U FFP given  Plan:  -H&H bid  -follow coag panel for reversal of coagulopathy        Protein S deficiency (HCC)- (present on admission)   Assessment & Plan    Diagnosed at age 16, on chronic anticoagulation        MS (multiple sclerosis) (HCC)- (present on admission)   Assessment & Plan    - Stable   -On Tysabri infusion [id number]  -baclofen intrathecal pump for back pain

## 2018-08-09 NOTE — PROGRESS NOTES
Internal Medicine Interval Note  Note Author: Donald Simeon M.D.     Name Nadia Lazaro     1965   Age/Sex 53 y.o. female   MRN [id number]   Code Status DNR/DNI     After 5PM or if no immediate response to page, please call for cross-coverage  Attending/Team: David/Vladimir See Patient List for primary contact information  Call [phone] to page    1st Call - Day Intern (R1):   Samantha 2nd Call - Day Sr. Resident (R2/R3):   Cailin         Reason for interval visit  (Principal Problem)   R hip hematoma      Interval Problem Daily Status Update  (24 hours, problem oriented, brief subjective history, new lab/imaging data pertinent to that problem)      - INR: 3.9   - Some improvement inher pain   - no signs of compartment syndrome   - No lauro for FFP or vit K today.         Review of Systems   Constitutional: Negative.    HENT: Negative.    Eyes: Negative.    Respiratory: Negative.    Cardiovascular: Negative.    Gastrointestinal: Negative.    Genitourinary: Negative.    Musculoskeletal: Negative.    Skin:        Bruising on lateral side of R hip   Neurological: Negative.    Endo/Heme/Allergies: Bruises/bleeds easily.   Psychiatric/Behavioral: Negative.        Disposition/Barriers to discharge:   Inpatient/ send her home     Consultants/Specialty  None      PCP: Elana Gillespie D.O.      Quality Measures  Quality-Core Measures   Reviewed items::  Labs reviewed, Medications reviewed and Radiology images reviewed  Ocampo catheter::  No Ocampo  DVT: no meds, intramascular bleeding           Physical Exam       Vitals:    18 0400 18 0800 18 1200 18 1600   BP: 114/63 126/80 100/69 101/57   Pulse: 84 94 (!) 102 (!) 101   Resp: 18 18 16 16   Temp: 37.5 °C (99.5 °F) 36.8 °C (98.2 °F) 36.4 °C (97.6 °F) 36.6 °C (97.8 °F)   SpO2: 96% 95% 99% 98%   Weight:       Height:         Body mass index is 32.36 kg/m². Weight: 99.4 kg (219 lb 2.2 oz)  Oxygen Therapy:  Pulse Oximetry:  98 %, O2 (LPM): 0, O2 Delivery: None (Room Air)    Physical Exam   Constitutional: She is oriented to person, place, and time and well-developed, well-nourished, and in no distress.   HENT:   Head: Normocephalic and atraumatic.   Eyes: Pupils are equal, round, and reactive to light. Conjunctivae and EOM are normal.   Neck: Normal range of motion. Neck supple.   Cardiovascular: Normal rate, regular rhythm, normal heart sounds and intact distal pulses.    Pulmonary/Chest: Effort normal and breath sounds normal.   Abdominal: Soft. Bowel sounds are normal.   Musculoskeletal: Normal range of motion.   R hip: warm to palpation along hip and lateral thigh; not firm; sensation intact, normal strength  Distal pedal pulses intact b/l, no pallor     Neurological: She is alert and oriented to person, place, and time. No cranial nerve deficit.   Skin: Skin is warm. No pallor.   bruisers      Lab Results   Component Value Date/Time    WBC 17.4 (H) 08/08/2018 05:52 PM    RBC 2.64 (L) 08/08/2018 05:52 PM    HEMOGLOBIN 8.1 (L) 08/08/2018 05:52 PM    HEMATOCRIT 24.4 (L) 08/08/2018 05:52 PM    MCV 92.4 08/08/2018 05:52 PM    MCH 30.7 08/08/2018 05:52 PM    MCHC 33.2 (L) 08/08/2018 05:52 PM    MPV 10.4 08/08/2018 05:52 PM    NEUTSPOLYS 61.30 08/07/2018 11:57 PM    LYMPHOCYTES 24.30 08/07/2018 11:57 PM    MONOCYTES 11.70 08/07/2018 11:57 PM    EOSINOPHILS 1.70 08/07/2018 11:57 PM    BASOPHILS 0.30 08/07/2018 11:57 PM      Lab Results   Component Value Date/Time    PROTHROMBTM 38.5 (H) 08/07/2018 11:57 PM    INR 3.97 (H) 08/07/2018 11:57 PM          Assessment/Plan     * Hematoma of right hip- (present on admission)   Assessment & Plan    - Hemodynamically stable, no signs of compartment syndrome.  Pain at 7/10.  -CT 8/7/2018: Probable hematoma within the musculature posterior medial to R hip with findings concerning for arterial hemorrhage in the R gluteal muscle.  She was given  2U FFP given no RBC   - received 10 mg PO vit K  - INR  3.9   -CTM signs of compartment syndrome: paresthesias, pain out of proportion, weakness, decreased pulses.   -If no improvement, consider IR consult for embolization.           Anemia due to acute blood loss- (present on admission)   Assessment & Plan    Hb 10.9 --> 8.9. Most likely d/t acute blood loss. Hemodynamically stable.  stable  - Would consider IR consult if no improvement  - transfusion if Hb less than 7        Warfarin toxicity, accidental or unintentional, initial encounter- (present on admission)   Assessment & Plan    Took a 10mg tablet instead of a 7.5mg tablet recently. This may have contributed to her acute bleeding.  - Cont INR checks, her baseline is 3-4 but we are willing to push it as priority is controlling her bleed.        Warfarin-induced coagulopathy (HCC)- (present on admission)   Assessment & Plan    -chronically on  Warfarin with INR 3-4 at home.  Has had 11 DVT's in past, including one at INR 2.7 before.  -LE duplex negative for DVTs  - Had IVC filter in place from 6410-1764. Had it removed last year as it had perforated too high.  -CTM coagulation        Coagulopathy (HCC)   Assessment & Plan    -PT 97.9, .8, INR>10  -Hb 11.2, Hct 34.1  - Vitamin K and 2U FFP given  Plan:  -H&H bid  -follow coag panel for reversal of coagulopathy        Protein S deficiency (HCC)- (present on admission)   Assessment & Plan    Diagnosed at age 16, on chronic anticoagulation        MS (multiple sclerosis) (HCC)- (present on admission)   Assessment & Plan    - stable   -On Tysabri infusion [id number]  -baclofen intrathecal pump for back pain

## 2018-08-09 NOTE — CARE PLAN
Problem: Safety  Goal: Will remain free from falls    Intervention: Assess risk factors for falls  Fall risk assessed and patient educated to call for assistance when needed.      Problem: Knowledge Deficit  Goal: Knowledge of disease process/condition, treatment plan, diagnostic tests, and medications will improve    Intervention: Assess knowledge level of disease process/condition, treatment plan, diagnostic tests, and medications  Knowledge assessed regarding plan of care, patient verbalized understanding.

## 2018-08-09 NOTE — NON-PROVIDER
"       Internal Medicine Medical Student Note  Note Author: Leighann Jc, Student    Name Nadia Lazaro     1965   Age/Sex 53 y.o. female   MRN [id number]   Code Status Full Code         Reason for interval visit  (Principal Problem)   Hematoma of right hip    Interval Problem Daily Status Update  (problem status, last 24 hours, new history, new data )   This morning Ms. Lazaro is feeling \"80% better\" than when she came to the hospital. She is still experiencing 2/10 pain at rest and 6/10 pain with movement, but all of the pain is now localized to the back of her knee. She no longer feels pain in the gluteal/upper thigh area at all. She is able to get up and go to the restroom about every hour or so. She states she has no pain when she walks with the walker, but when she only uses the IV pole to ambulate she does feel pain in the back of the right knee. She is not feeling any numbness or tingling in the foot, and there is no pain out of proportion to injury. She has not had a bowel movement since Tuesday, but she does not feel abdominal pain or distention at this time. She denies weakness or lightheadedness.    Review of Systems   Constitutional: Negative for chills and fever.   HENT: Negative for ear pain and hearing loss.    Eyes: Negative for blurred vision and double vision.   Respiratory: Negative for cough, hemoptysis and shortness of breath.    Cardiovascular: Negative for chest pain, palpitations and orthopnea.   Gastrointestinal: Negative for abdominal pain, constipation, diarrhea, heartburn, nausea and vomiting.   Genitourinary: Negative for dysuria, frequency and urgency.   Musculoskeletal: Negative for myalgias.   Neurological: Negative for weakness.       Physical Exam       Vitals:    18 0037 18 0350 18 0546 18 0800   BP: 105/63 100/58  (!) 98/61   Pulse: 98 93 84 88   Resp: 16 16  16   Temp: 36.9 °C (98.4 °F) 37.5 °C (99.5 °F)  36.4 °C (97.5 °F)   SpO2: 95% " 93%  97%   Weight:       Height:         Body mass index is 32.78 kg/m². Weight: 100.7 kg (222 lb 0.1 oz)  Oxygen Therapy:  Pulse Oximetry: 97 %, O2 (LPM): 0, O2 Delivery: None (Room Air)    Physical Exam   Constitutional: She is oriented to person, place, and time and well-developed, well-nourished, and in no distress.   HENT:   Head: Normocephalic and atraumatic.   Mouth/Throat: No oropharyngeal exudate.   Eyes: Pupils are equal, round, and reactive to light. EOM are normal.   Neck: Normal range of motion.   Cardiovascular: Normal rate and regular rhythm.  Exam reveals no gallop and no friction rub.    No murmur heard.  Pulmonary/Chest: Effort normal and breath sounds normal. No respiratory distress. She has no wheezes. She has no rales.   Abdominal: Soft. Bowel sounds are normal. She exhibits no distension. There is no tenderness. There is no rebound.   Musculoskeletal: Normal range of motion.   Significant ecchymosis- posterior right lower extremity. Tenderness on palpation is minimal compared to past exams.   Lymphadenopathy:     She has no cervical adenopathy.   Neurological: She is alert and oriented to person, place, and time.   Skin: Skin is warm and dry. Ecchymosis noted.     Hema   Ref. Range 8/7/2018 14:37 8/7/2018 23:57 8/7/2018 23:57 8/8/2018 05:46 8/8/2018 17:52 8/9/2018 06:01   WBC Latest Ref Range: 4.8 - 10.8 K/uL 16.4 (H) 14.7 (H) 14.7 (H) 15.2 (H) 17.4 (H) 13.8 (H)   RBC Latest Ref Range: 4.20 - 5.40 M/uL 2.87 (L) 2.67 (L) 2.65 (L) 2.64 (L) 2.64 (L) 2.46 (L)   Hemoglobin Latest Ref Range: 12.0 - 16.0 g/dL 8.9 (L) 8.2 (L) 8.3 (L) 8.1 (L) 8.1 (L) 7.4 (L)   Hematocrit Latest Ref Range: 37.0 - 47.0 % 25.7 (L) 24.1 (L) 24.0 (L) 23.9 (L) 24.4 (L) 22.7 (L)   MCV Latest Ref Range: 81.4 - 97.8 fL 89.5 90.3 90.6 90.5 92.4 92.3     Iron Panel   Ref. Range 8/9/2018 06:01   Iron Latest Ref Range: 40 - 170 ug/dL 15 (L)   Total Iron Binding Latest Ref Range: 250 - 450 ug/dL 279   % Saturation Latest Ref Range:  15 - 55 % 5 (L)     Ferritin Latest Ref Range: 10.0 - 291.0 ng/mL 55.9     Coagulation   Ref. Range 8/6/2018 19:54 8/7/2018 03:33 8/7/2018 14:37 8/7/2018 23:57 8/9/2018 06:01   PT Latest Ref Range: 12.0 - 14.6 sec 97.9 (HH) 93.2 (HH) 43.3 (H) 38.5 (H) 39.1 (H)   INR Latest Ref Range: 0.87 - 1.13  >=10.00 (HH) >=10.00 (HH) 4.60 (H) 3.97 (H) 4.04 (H)     Assessment/Plan     1) Hematoma of posterior medial hip/gluteal muscle  - Both seen on CT on 8/6, INR >10, PT 97.9 on presentation to ED   - H&H were trending down from 11.2/34 to 8.8/26.6, she was stable around 8.2 for 24 hours, then this morning her H&H was 7.4/22.7  - Her iron panel was abnormal: Iron 15, TIBC 279, % saturation 5. Will give her IV iron today  - The bleeding in the gluteal hematoma has likely slowed down but may not be completely stopped- will do a new CT today to rule out worsening of the bleed  - Patient is still experiencing pain in the back of the right knee, but the pain in the right buttock and thigh has resolved  - The bruising was much more prominent this morning upon physical exam, which is expected due to gravity dependence  - TEG showed reaction time of 12.9, which is between the threshold of administering 1 unit of FFP and 2 units of FFP-- we gave her 2 units FFP after the 5 mg PO vitamin K she got in the ED-- INR is now 4.04  - Because her baseline Hgb seems to be around 14 and she is now at 7.4 we will give her 1 unit of pRBC's today  - Will also give her a small dose of vitamin K today to try to slightly lower her INR - 2.5 mg Vit K PO  - Will NOT start anticoagulation yet-- she will stay here for now until we can safely put her back on her anticoagulation and she is stable, in less pain.   - Closely monitor INR, goal INR 3-4  - It is unlikely we will need IR to do an embolization of the bleed at this point, so patient is taken off NPO      2) Supratherapeutic INR and chronic anticoagulation  - INR 8/6 and 8/7 in the morning was >10;  PT 93.2 down from 97.9. Her PTT was 160.8 Monday and has not been rechecked since  - Yesterday afternoon INR was 3.97, this morning it was 4.04 (goal for now is around 2.5-3 to ensure bleeding stops)  - Patient takes warfarin 10 mg on Tuesdays and Saturdays, and 7.5 mg every other day of the week  - She mentioned changing her schedule slightly (taking the 10 mg a day early), it is possible this could play a role in the dramatic increase in the INR  - Protein S deficiency: patient's doctor set her goal INR for 3-4 (she has had DVT's at INR of 2.7)    3) Leukocytosis- trending down  - This morning her WBC count is down to 13.8 from 17.4 yesterday  - There are no fevers, signs of infection in this patient  - Likely due to the stress response from this painful episode but will continue to monitor     4) Multiple Sclerosis  - No relapses during this hospitalization    - Patient receives Tysabri IV every 8 weeks  - Has baclofen pump for back muscle spasms   - Patient states her MS is relatively well controlled as long as she stays out of the sun     Parts of assessment and plan copied from my previous note

## 2018-08-09 NOTE — PROGRESS NOTES
PATIENT RECEIVING 1U PRBC. NO TRANSFUSION REACTION OBSERVED. PATIENT RESTING IN BED. BED IN LOW LOCKED POSITION, CALL BELL WITHIN REACH. CONTINUE TO MONITOR.

## 2018-08-10 LAB
ALBUMIN SERPL BCP-MCNC: 3.6 G/DL (ref 3.2–4.9)
ALBUMIN/GLOB SERPL: 1.4 G/DL
ALP SERPL-CCNC: 62 U/L (ref 30–99)
ALT SERPL-CCNC: 9 U/L (ref 2–50)
ANION GAP SERPL CALC-SCNC: 7 MMOL/L (ref 0–11.9)
APTT PPP: 101.2 SEC (ref 24.7–36)
APTT PPP: 37.7 SEC (ref 24.7–36)
APTT PPP: 92.6 SEC (ref 24.7–36)
AST SERPL-CCNC: 12 U/L (ref 12–45)
BILIRUB SERPL-MCNC: 0.9 MG/DL (ref 0.1–1.5)
BUN SERPL-MCNC: 15 MG/DL (ref 8–22)
CALCIUM SERPL-MCNC: 8.7 MG/DL (ref 8.5–10.5)
CHLORIDE SERPL-SCNC: 110 MMOL/L (ref 96–112)
CO2 SERPL-SCNC: 23 MMOL/L (ref 20–33)
CREAT SERPL-MCNC: 0.7 MG/DL (ref 0.5–1.4)
ERYTHROCYTE [DISTWIDTH] IN BLOOD BY AUTOMATED COUNT: 53.7 FL (ref 35.9–50)
ERYTHROCYTE [DISTWIDTH] IN BLOOD BY AUTOMATED COUNT: 54.2 FL (ref 35.9–50)
GLOBULIN SER CALC-MCNC: 2.6 G/DL (ref 1.9–3.5)
GLUCOSE SERPL-MCNC: 105 MG/DL (ref 65–99)
HCT VFR BLD AUTO: 26.4 % (ref 37–47)
HCT VFR BLD AUTO: 28.2 % (ref 37–47)
HGB BLD-MCNC: 8.7 G/DL (ref 12–16)
HGB BLD-MCNC: 9.2 G/DL (ref 12–16)
INR PPP: 1.26 (ref 0.87–1.13)
MCH RBC QN AUTO: 29.5 PG (ref 27–33)
MCH RBC QN AUTO: 30.3 PG (ref 27–33)
MCHC RBC AUTO-ENTMCNC: 32.6 G/DL (ref 33.6–35)
MCHC RBC AUTO-ENTMCNC: 33 G/DL (ref 33.6–35)
MCV RBC AUTO: 90.4 FL (ref 81.4–97.8)
MCV RBC AUTO: 92 FL (ref 81.4–97.8)
PLATELET # BLD AUTO: 315 K/UL (ref 164–446)
PLATELET # BLD AUTO: 348 K/UL (ref 164–446)
PMV BLD AUTO: 10.4 FL (ref 9–12.9)
PMV BLD AUTO: 10.7 FL (ref 9–12.9)
POTASSIUM SERPL-SCNC: 4 MMOL/L (ref 3.6–5.5)
PROT SERPL-MCNC: 6.2 G/DL (ref 6–8.2)
PROTHROMBIN TIME: 15.5 SEC (ref 12–14.6)
RBC # BLD AUTO: 2.87 M/UL (ref 4.2–5.4)
RBC # BLD AUTO: 3.12 M/UL (ref 4.2–5.4)
SODIUM SERPL-SCNC: 140 MMOL/L (ref 135–145)
WBC # BLD AUTO: 12.3 K/UL (ref 4.8–10.8)
WBC # BLD AUTO: 15.4 K/UL (ref 4.8–10.8)

## 2018-08-10 PROCEDURE — 93971 EXTREMITY STUDY: CPT

## 2018-08-10 PROCEDURE — 700111 HCHG RX REV CODE 636 W/ 250 OVERRIDE (IP): Performed by: STUDENT IN AN ORGANIZED HEALTH CARE EDUCATION/TRAINING PROGRAM

## 2018-08-10 PROCEDURE — 85610 PROTHROMBIN TIME: CPT

## 2018-08-10 PROCEDURE — 85027 COMPLETE CBC AUTOMATED: CPT | Mod: 91

## 2018-08-10 PROCEDURE — 93971 EXTREMITY STUDY: CPT | Mod: 26 | Performed by: SURGERY

## 2018-08-10 PROCEDURE — 99233 SBSQ HOSP IP/OBS HIGH 50: CPT | Mod: GC | Performed by: HOSPITALIST

## 2018-08-10 PROCEDURE — 770020 HCHG ROOM/CARE - TELE (206)

## 2018-08-10 PROCEDURE — 85730 THROMBOPLASTIN TIME PARTIAL: CPT | Mod: 91

## 2018-08-10 PROCEDURE — 80053 COMPREHEN METABOLIC PANEL: CPT

## 2018-08-10 PROCEDURE — 700102 HCHG RX REV CODE 250 W/ 637 OVERRIDE(OP): Performed by: HOSPITALIST

## 2018-08-10 PROCEDURE — 700111 HCHG RX REV CODE 636 W/ 250 OVERRIDE (IP): Performed by: HOSPITALIST

## 2018-08-10 PROCEDURE — 36415 COLL VENOUS BLD VENIPUNCTURE: CPT

## 2018-08-10 PROCEDURE — A9270 NON-COVERED ITEM OR SERVICE: HCPCS | Performed by: STUDENT IN AN ORGANIZED HEALTH CARE EDUCATION/TRAINING PROGRAM

## 2018-08-10 PROCEDURE — A9270 NON-COVERED ITEM OR SERVICE: HCPCS | Performed by: HOSPITALIST

## 2018-08-10 PROCEDURE — 700102 HCHG RX REV CODE 250 W/ 637 OVERRIDE(OP): Performed by: STUDENT IN AN ORGANIZED HEALTH CARE EDUCATION/TRAINING PROGRAM

## 2018-08-10 RX ORDER — HEPARIN SODIUM 1000 [USP'U]/ML
6000 INJECTION, SOLUTION INTRAVENOUS; SUBCUTANEOUS ONCE
Status: COMPLETED | OUTPATIENT
Start: 2018-08-10 | End: 2018-08-10

## 2018-08-10 RX ORDER — HEPARIN SODIUM 1000 [USP'U]/ML
3200 INJECTION, SOLUTION INTRAVENOUS; SUBCUTANEOUS PRN
Status: DISCONTINUED | OUTPATIENT
Start: 2018-08-10 | End: 2018-08-16 | Stop reason: HOSPADM

## 2018-08-10 RX ORDER — WARFARIN SODIUM 10 MG/1
10 TABLET ORAL
Status: COMPLETED | OUTPATIENT
Start: 2018-08-10 | End: 2018-08-10

## 2018-08-10 RX ADMIN — IRON SUCROSE 200 MG: 20 INJECTION, SOLUTION INTRAVENOUS at 05:26

## 2018-08-10 RX ADMIN — HEPARIN SODIUM 1200 UNITS/HR: 5000 INJECTION, SOLUTION INTRAVENOUS at 10:08

## 2018-08-10 RX ADMIN — HEPARIN SODIUM 6000 UNITS: 1000 INJECTION, SOLUTION INTRAVENOUS; SUBCUTANEOUS at 10:09

## 2018-08-10 RX ADMIN — VITAMIN D, TAB 1000IU (100/BT) 1000 UNITS: 25 TAB at 18:03

## 2018-08-10 RX ADMIN — WARFARIN SODIUM 10 MG: 10 TABLET ORAL at 18:03

## 2018-08-10 RX ADMIN — VITAMIN D, TAB 1000IU (100/BT) 1000 UNITS: 25 TAB at 05:27

## 2018-08-10 ASSESSMENT — ENCOUNTER SYMPTOMS
GASTROINTESTINAL NEGATIVE: 1
EYES NEGATIVE: 1
MUSCULOSKELETAL NEGATIVE: 1
RESPIRATORY NEGATIVE: 1
PSYCHIATRIC NEGATIVE: 1
CONSTITUTIONAL NEGATIVE: 1
CARDIOVASCULAR NEGATIVE: 1
BRUISES/BLEEDS EASILY: 1
NEUROLOGICAL NEGATIVE: 1

## 2018-08-10 ASSESSMENT — PAIN SCALES - GENERAL
PAINLEVEL_OUTOF10: 0

## 2018-08-10 NOTE — PROGRESS NOTES
Received patient from night shift nurse. Assessment complete. A&Ox4. Denies pain at this time. Call light within reach. All needs attended to at this time. Bed in low position, treaded slippers on feet.

## 2018-08-10 NOTE — PROGRESS NOTES
"Bedside report received, assumed care of patient. Assessment performed.  Patient had an episode of incontinence while eating dinner and got very upset and tearful.  Patient cleaned and able to finish eating.  RN applied to lotion to both legs, patient states, \"my leg feels better today than it has.\" Discussed plan of care with pt.  Call light within reach, pt educated to call for assistance.     "

## 2018-08-10 NOTE — PROGRESS NOTES
Patient's INR dropped to 1.26 from 4.04 this am.  [facility] doctors notified.  Will discuss with day team.

## 2018-08-11 LAB
ALBUMIN SERPL BCP-MCNC: 3.2 G/DL (ref 3.2–4.9)
ALBUMIN/GLOB SERPL: 1.5 G/DL
ALP SERPL-CCNC: 53 U/L (ref 30–99)
ALT SERPL-CCNC: 7 U/L (ref 2–50)
ANION GAP SERPL CALC-SCNC: 7 MMOL/L (ref 0–11.9)
APTT PPP: 93.7 SEC (ref 24.7–36)
AST SERPL-CCNC: 12 U/L (ref 12–45)
BILIRUB SERPL-MCNC: 0.7 MG/DL (ref 0.1–1.5)
BUN SERPL-MCNC: 13 MG/DL (ref 8–22)
CALCIUM SERPL-MCNC: 8.1 MG/DL (ref 8.5–10.5)
CHLORIDE SERPL-SCNC: 112 MMOL/L (ref 96–112)
CO2 SERPL-SCNC: 22 MMOL/L (ref 20–33)
CREAT SERPL-MCNC: 0.64 MG/DL (ref 0.5–1.4)
ERYTHROCYTE [DISTWIDTH] IN BLOOD BY AUTOMATED COUNT: 54.5 FL (ref 35.9–50)
ERYTHROCYTE [DISTWIDTH] IN BLOOD BY AUTOMATED COUNT: 54.6 FL (ref 35.9–50)
GLOBULIN SER CALC-MCNC: 2.2 G/DL (ref 1.9–3.5)
GLUCOSE SERPL-MCNC: 100 MG/DL (ref 65–99)
HCT VFR BLD AUTO: 24.6 % (ref 37–47)
HCT VFR BLD AUTO: 29 % (ref 37–47)
HGB BLD-MCNC: 8 G/DL (ref 12–16)
HGB BLD-MCNC: 9.5 G/DL (ref 12–16)
INR PPP: 1.16 (ref 0.87–1.13)
MCH RBC QN AUTO: 29.7 PG (ref 27–33)
MCH RBC QN AUTO: 30.2 PG (ref 27–33)
MCHC RBC AUTO-ENTMCNC: 32.5 G/DL (ref 33.6–35)
MCHC RBC AUTO-ENTMCNC: 32.8 G/DL (ref 33.6–35)
MCV RBC AUTO: 91.4 FL (ref 81.4–97.8)
MCV RBC AUTO: 92.1 FL (ref 81.4–97.8)
PLATELET # BLD AUTO: 366 K/UL (ref 164–446)
PLATELET # BLD AUTO: 424 K/UL (ref 164–446)
PMV BLD AUTO: 10.3 FL (ref 9–12.9)
PMV BLD AUTO: 10.6 FL (ref 9–12.9)
POTASSIUM SERPL-SCNC: 3.8 MMOL/L (ref 3.6–5.5)
PROT SERPL-MCNC: 5.4 G/DL (ref 6–8.2)
PROTHROMBIN TIME: 14.5 SEC (ref 12–14.6)
RBC # BLD AUTO: 2.69 M/UL (ref 4.2–5.4)
RBC # BLD AUTO: 3.15 M/UL (ref 4.2–5.4)
SODIUM SERPL-SCNC: 141 MMOL/L (ref 135–145)
WBC # BLD AUTO: 11.9 K/UL (ref 4.8–10.8)
WBC # BLD AUTO: 12.6 K/UL (ref 4.8–10.8)

## 2018-08-11 PROCEDURE — 85730 THROMBOPLASTIN TIME PARTIAL: CPT

## 2018-08-11 PROCEDURE — 80053 COMPREHEN METABOLIC PANEL: CPT

## 2018-08-11 PROCEDURE — 36415 COLL VENOUS BLD VENIPUNCTURE: CPT

## 2018-08-11 PROCEDURE — 700111 HCHG RX REV CODE 636 W/ 250 OVERRIDE (IP): Performed by: STUDENT IN AN ORGANIZED HEALTH CARE EDUCATION/TRAINING PROGRAM

## 2018-08-11 PROCEDURE — 700102 HCHG RX REV CODE 250 W/ 637 OVERRIDE(OP): Performed by: STUDENT IN AN ORGANIZED HEALTH CARE EDUCATION/TRAINING PROGRAM

## 2018-08-11 PROCEDURE — 85610 PROTHROMBIN TIME: CPT

## 2018-08-11 PROCEDURE — 700102 HCHG RX REV CODE 250 W/ 637 OVERRIDE(OP): Performed by: HOSPITALIST

## 2018-08-11 PROCEDURE — A9270 NON-COVERED ITEM OR SERVICE: HCPCS | Performed by: STUDENT IN AN ORGANIZED HEALTH CARE EDUCATION/TRAINING PROGRAM

## 2018-08-11 PROCEDURE — A9270 NON-COVERED ITEM OR SERVICE: HCPCS | Performed by: HOSPITALIST

## 2018-08-11 PROCEDURE — 770020 HCHG ROOM/CARE - TELE (206)

## 2018-08-11 PROCEDURE — 85027 COMPLETE CBC AUTOMATED: CPT | Mod: 91

## 2018-08-11 PROCEDURE — 700111 HCHG RX REV CODE 636 W/ 250 OVERRIDE (IP): Performed by: HOSPITALIST

## 2018-08-11 PROCEDURE — 99232 SBSQ HOSP IP/OBS MODERATE 35: CPT | Mod: GC | Performed by: HOSPITALIST

## 2018-08-11 RX ORDER — PAROXETINE HYDROCHLORIDE 20 MG/1
20 TABLET, FILM COATED ORAL DAILY
Status: DISCONTINUED | OUTPATIENT
Start: 2018-08-11 | End: 2018-08-16 | Stop reason: HOSPADM

## 2018-08-11 RX ORDER — WARFARIN SODIUM 10 MG/1
10 TABLET ORAL
Status: COMPLETED | OUTPATIENT
Start: 2018-08-11 | End: 2018-08-11

## 2018-08-11 RX ADMIN — IRON SUCROSE 200 MG: 20 INJECTION, SOLUTION INTRAVENOUS at 04:58

## 2018-08-11 RX ADMIN — HEPARIN SODIUM 1200 UNITS/HR: 5000 INJECTION, SOLUTION INTRAVENOUS at 09:32

## 2018-08-11 RX ADMIN — VITAMIN D, TAB 1000IU (100/BT) 1000 UNITS: 25 TAB at 17:45

## 2018-08-11 RX ADMIN — VITAMIN D, TAB 1000IU (100/BT) 1000 UNITS: 25 TAB at 04:58

## 2018-08-11 RX ADMIN — PAROXETINE HYDROCHLORIDE 20 MG: 20 TABLET, FILM COATED ORAL at 17:45

## 2018-08-11 RX ADMIN — WARFARIN SODIUM 10 MG: 10 TABLET ORAL at 17:45

## 2018-08-11 ASSESSMENT — ENCOUNTER SYMPTOMS
EYES NEGATIVE: 1
RESPIRATORY NEGATIVE: 1
CARDIOVASCULAR NEGATIVE: 1
BRUISES/BLEEDS EASILY: 1
CONSTITUTIONAL NEGATIVE: 1
GASTROINTESTINAL NEGATIVE: 1
PSYCHIATRIC NEGATIVE: 1
NEUROLOGICAL NEGATIVE: 1
MUSCULOSKELETAL NEGATIVE: 1

## 2018-08-11 ASSESSMENT — PATIENT HEALTH QUESTIONNAIRE - PHQ9
2. FEELING DOWN, DEPRESSED, IRRITABLE, OR HOPELESS: NOT AT ALL
1. LITTLE INTEREST OR PLEASURE IN DOING THINGS: NOT AT ALL
SUM OF ALL RESPONSES TO PHQ9 QUESTIONS 1 AND 2: 0

## 2018-08-11 ASSESSMENT — PAIN SCALES - GENERAL
PAINLEVEL_OUTOF10: 0

## 2018-08-11 NOTE — NON-PROVIDER
Internal Medicine Medical Student Note  Note Author: Leighann Jc, Student    Name Nadia Lazaro     1965   Age/Sex 53 y.o. female   MRN [id number]   Code Status Full code             Reason for interval visit  (Principal Problem)   Hematoma of right hip    Interval Problem Daily Status Update  (problem status, last 24 hours, new history, new data )   Ms. Lazaro posterior right knee pain is improved. She is able to ambulate without pain with the use of the walker. There is no evidence of new bleeding or new clots at this time. Bruise now confluently expands from buttock region to below the knee of the posterior right leg. Mostly dark purple but some areas of green/yellow indicating heme breakdown. New LE ultrasound showed no DVT. INR dropped to 1.16, APTT 93.7. Hemoglobin also dropped from 9.2 to 8.0. Patient mentioned she has been feeling anxious and has not been on her home dose of Paxil. Would like to get that started today.     Review of Systems   Constitutional: Negative.    HENT: Negative.    Eyes: Negative.    Respiratory: Negative.    Cardiovascular: Negative.    Gastrointestinal: Negative.    Genitourinary: Negative.    Musculoskeletal: Negative.    Skin:        Confluent bruise from buttock down posterior thigh to the knee.    Neurological: Negative.    Endo/Heme/Allergies: Bruises/bleeds easily.       Physical Exam       Vitals:    18 0049 18 0400 18 0800 18 1215   BP: 106/56 (!) 90/65 104/63 (!) 96/59   Pulse: 82 88 80 89   Resp: 16 16 17 17   Temp: 36.3 °C (97.3 °F) 36.4 °C (97.5 °F) 36.4 °C (97.5 °F) 36.3 °C (97.3 °F)   SpO2: 94% 95% 99% 95%   Weight:       Height:         Body mass index is 34.09 kg/m². Weight: 104.7 kg (230 lb 13.2 oz)  Oxygen Therapy:  Pulse Oximetry: 95 %, O2 (LPM): 0, O2 Delivery: None (Room Air)    Physical Exam   Constitutional:   Very pleasant patient in no distress   HENT:   Head: Normocephalic and atraumatic.   Eyes: Pupils  are equal, round, and reactive to light. EOM are normal.   Neck: Normal range of motion.   Cardiovascular: Normal rate and regular rhythm.  Exam reveals no gallop and no friction rub.    No murmur heard.  Pulmonary/Chest: Effort normal and breath sounds normal. She has no wheezes. She has no rales.   Abdominal: Soft. Bowel sounds are normal. She exhibits no distension. There is no tenderness.   Musculoskeletal:   Distal pulses intact, sensation and strength normal in bilateral lower extremities. No signs of compartment syndrome   Skin: Skin is warm and dry.     Hematology   Ref. Range 8/10/2018 16:26 8/10/2018 22:47 8/11/2018 04:54   WBC Latest Ref Range: 4.8 - 10.8 K/uL 12.3 (H)  11.9 (H)   RBC Latest Ref Range: 4.20 - 5.40 M/uL 2.87 (L)  2.69 (L)   Hemoglobin Latest Ref Range: 12.0 - 16.0 g/dL 8.7 (L)  8.0 (L)   Hematocrit Latest Ref Range: 37.0 - 47.0 % 26.4 (L)  24.6 (L)   MCV Latest Ref Range: 81.4 - 97.8 fL 92.0  91.4   Coagulation   Ref. Range 8/11/2018 04:54   PT Latest Ref Range: 12.0 - 14.6 sec 14.5   INR Latest Ref Range: 0.87 - 1.13  1.16 (H)   APTT Latest Ref Range: 24.7 - 36.0 sec 93.7 (H)       Assessment/Plan     1) Hematoma of posterior medial hip/gluteal muscle  - Seen on CT on 8/6, INR >10, PT 97.9 on presentation to ED. New CT yesterday showed no evidence of new/worsening bleeding  - Hgb dropped from 11 to 7.4 but baseline around 14, so patient received 1 unit pRBC's on 8/9. Now Hgb up to 8 (dropped slightly from 9.2)  - Iron panel was abnormal: Iron 15, TIBC 279, % saturation 5. Started IV iron   - TEG showed reaction time of 12.9, gave 2 units FFP and 5 mg vit K PO. Got additional 2.5 mg vit K PO, INR now 1.16  - Started heparin and warfarin  - INR too low to safely discharge patient with her history of protein S deficiency so we will wait until INR between 2.8 and 3.9 before she can go home. Monitor signs of new bleed or new clot.    2) Supratherapeutic INR and chronic anticoagulation  - INR  8/6 and 8/7 in the morning was >10   - Current INR is 1.16  - Patient takes warfarin 10 mg on Tuesdays and Saturdays, and 7.5 mg every other day of the week  - Protein S deficiency: patient's doctor set her goal INR for 3-4 (she has had DVT's at INR of 2.7)  - Patient was started on heparin drip and warfarin yesterday, will continue to monitor INR daily until it is stable between 2.8 and 3.9    3) Leukocytosis- trending down  - This morning her WBC count is down to 11.9   - There are no fevers, signs of infection in this patient  - Likely due to the stress response (leukemoid) from this painful episode but will continue to monitor     4) Anxiety  - Patient on Paxil for generalized anxiety at home  - Will restart Paxil today at 20 mg    5) Multiple Sclerosis  - No relapses during this hospitalization    - Patient receives Tysabri IV every 8 weeks  - Has baclofen pump for back muscle spasms      Parts of assessment and plan copied from my previous note

## 2018-08-11 NOTE — PROGRESS NOTES
Received patient from night shift nurse. Assessment complete. A&Ox4. Denies pain. Call light within reach. All needs attended to at this time. Bed in low position, treaded slippers on feet. Pt on heparin gtt.

## 2018-08-11 NOTE — PROGRESS NOTES
Internal Medicine Interval Note  Note Author: Triniyt Singh M.D.     Name Nadia Lazaro     1965   Age/Sex 53 y.o. female   MRN [id number]   Code Status DNR/DNI     After 5PM or if no immediate response to page, please call for cross-coverage  Attending/Team: David/Vladimir See Patient List for primary contact information  Call [phone] to page    1st Call - Day Intern (R1):   Samantha 2nd Call - Day Sr. Resident (R2/R3):   Cailin         Reason for interval visit  (Principal Problem)   R hip hematoma      Interval Problem Daily Status Update  (24 hours, problem oriented, brief subjective history, new lab/imaging data pertinent to that problem)     - INR 1.26 --> started Heparin IV per protocol  - Given her pain is now relegated to behind her R knee and her low INR, got LE u/s --> no DVT          Review of Systems   Constitutional: Negative.    HENT: Negative.    Eyes: Negative.    Respiratory: Negative.    Cardiovascular: Negative.    Gastrointestinal: Negative.    Genitourinary: Negative.    Musculoskeletal: Negative.    Skin:        Bruising on lateral side of R hip   Neurological: Negative.    Endo/Heme/Allergies: Bruises/bleeds easily.   Psychiatric/Behavioral: Negative.        Disposition/Barriers to discharge:   Heparin IV    Consultants/Specialty  None      PCP: Elana Gillespie D.O.      Quality Measures  Quality-Core Measures   Reviewed items::  Labs reviewed, Medications reviewed and Radiology images reviewed  Ocampo catheter::  No Ocampo  DVT prophylaxis pharmacological::  Warfarin (Coumadin) and Heparin          Physical Exam       Vitals:    18 0049 18 0400 18 0800 18 1215   BP: 106/56 (!) 90/65 104/63 (!) 96/59   Pulse: 82 88 80 89   Resp: 16 16 17 17   Temp: 36.3 °C (97.3 °F) 36.4 °C (97.5 °F) 36.4 °C (97.5 °F) 36.3 °C (97.3 °F)   SpO2: 94% 95% 99% 95%   Weight:       Height:         Body mass index is 34.09 kg/m². Weight: 104.7 kg (230 lb  13.2 oz)  Oxygen Therapy:  Pulse Oximetry: 95 %, O2 (LPM): 0, O2 Delivery: None (Room Air)    Physical Exam   Constitutional: She is oriented to person, place, and time and well-developed, well-nourished, and in no distress.   HENT:   Head: Normocephalic and atraumatic.   Eyes: Pupils are equal, round, and reactive to light. Conjunctivae and EOM are normal.   Neck: Normal range of motion. Neck supple.   Cardiovascular: Normal rate, regular rhythm, normal heart sounds and intact distal pulses.    Pulmonary/Chest: Effort normal and breath sounds normal.   Abdominal: Soft. Bowel sounds are normal.   Musculoskeletal: Normal range of motion.   R hip: warm to palpation along hip and lateral thigh; not firm; sensation intact, normal strength  R knee: mildly tender to palpation posteriorly  Distal pedal pulses intact b/l, no pallor     Neurological: She is alert and oriented to person, place, and time. No cranial nerve deficit.   Skin: Skin is warm. No pallor.   Bruises along R lateral hip and thigh, slightly more pronounced today.     Lab Results   Component Value Date/Time    WBC 11.9 (H) 08/11/2018 04:54 AM    RBC 2.69 (L) 08/11/2018 04:54 AM    HEMOGLOBIN 8.0 (L) 08/11/2018 04:54 AM    HEMATOCRIT 24.6 (L) 08/11/2018 04:54 AM    MCV 91.4 08/11/2018 04:54 AM    MCH 29.7 08/11/2018 04:54 AM    MCHC 32.5 (L) 08/11/2018 04:54 AM    MPV 10.6 08/11/2018 04:54 AM    NEUTSPOLYS 58.70 08/09/2018 06:01 AM    LYMPHOCYTES 26.10 08/09/2018 06:01 AM    MONOCYTES 10.80 08/09/2018 06:01 AM    EOSINOPHILS 3.10 08/09/2018 06:01 AM    BASOPHILS 0.60 08/09/2018 06:01 AM      Lab Results   Component Value Date/Time    PROTHROMBTM 14.5 08/11/2018 04:54 AM    INR 1.16 (H) 08/11/2018 04:54 AM      CT Abdomen/Pelvis 8/9: Unchanged hemorrhage in R hip joint deep to gluteus muscle    Assessment/Plan     * Hematoma of right hip- (present on admission)   Assessment & Plan    - Hemodynamically stable, no signs of compartment syndrome.  Pain minimal  and now just behind her knee. Bruising along lateral leg does not indicate any new bleeding.  - INR 1.05 --> Continue Heparin IV.  - Warfarin 10mg given.  - Continue to monitor for signs of compartment syndrome: paresthesias, pain out of proportion, weakness, decreased pulses.          Anemia due to acute blood loss- (present on admission)   Assessment & Plan    Hb 8.7 -> 8. Most likely dilutional given IVF bolus given when starting IV Heparin yesterday.  - Hemodynamically stable.  - Fe 15 -> start iron sucrose 200mg IV q5 days. Now day 3 of 5.        Warfarin toxicity, accidental or unintentional, initial encounter- (present on admission)   Assessment & Plan    Took a 10mg tablet instead of a 7.5mg tablet recently. This may have contributed to her acute bleeding.  - Cont INR checks, her baseline is 3-4  - INR 1.05 --> Cont Heparin IV.  - Warfarin 10mg given.        Warfarin-induced coagulopathy (HCC)- (present on admission)   Assessment & Plan    -chronically on  Warfarin with INR 3-4 at home.  Has had 11 DVT's in past, including one at INR 2.7 before.  - Had IVC filter in place from 0196-8430. Had it removed last year as it had perforated too high.  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   - Cont INR checks, her baseline is 3-4  - INR 1.26 --> Heparin IV started.  - Warfarin 10mg given.  - H&H bid  -follow coag panel for reversal of coagulopathy        Protein S deficiency (HCC)- (present on admission)   Assessment & Plan    Diagnosed at age 16, on chronic anticoagulation        MS (multiple sclerosis) (HCC)- (present on admission)   Assessment & Plan    - Stable   -On Tysabri infusion [id number]  -baclofen intrathecal pump for back pain

## 2018-08-11 NOTE — PROGRESS NOTES
Bedside report received, assumed care of patient. Assessment performed. Patient states that right buttock still in pain but overall feels better.  Pulses intact to both lower extremities and warm.  Heparin drip verified per protocol. Discussed plan of care with pt.  Call light within reach, pt educated to call for assistance.

## 2018-08-12 PROBLEM — E83.51 HYPOCALCEMIA: Status: ACTIVE | Noted: 2018-08-12

## 2018-08-12 LAB
ALBUMIN SERPL BCP-MCNC: 3.5 G/DL (ref 3.2–4.9)
ALBUMIN/GLOB SERPL: 1.3 G/DL
ALP SERPL-CCNC: 63 U/L (ref 30–99)
ALT SERPL-CCNC: 6 U/L (ref 2–50)
ANION GAP SERPL CALC-SCNC: 7 MMOL/L (ref 0–11.9)
APTT PPP: 88.7 SEC (ref 24.7–36)
AST SERPL-CCNC: 12 U/L (ref 12–45)
BILIRUB SERPL-MCNC: 0.9 MG/DL (ref 0.1–1.5)
BUN SERPL-MCNC: 11 MG/DL (ref 8–22)
CALCIUM SERPL-MCNC: 8.4 MG/DL (ref 8.5–10.5)
CHLORIDE SERPL-SCNC: 105 MMOL/L (ref 96–112)
CO2 SERPL-SCNC: 25 MMOL/L (ref 20–33)
CREAT SERPL-MCNC: 0.75 MG/DL (ref 0.5–1.4)
ERYTHROCYTE [DISTWIDTH] IN BLOOD BY AUTOMATED COUNT: 52.7 FL (ref 35.9–50)
ERYTHROCYTE [DISTWIDTH] IN BLOOD BY AUTOMATED COUNT: 52.9 FL (ref 35.9–50)
GLOBULIN SER CALC-MCNC: 2.7 G/DL (ref 1.9–3.5)
GLUCOSE SERPL-MCNC: 101 MG/DL (ref 65–99)
HCT VFR BLD AUTO: 26.5 % (ref 37–47)
HCT VFR BLD AUTO: 26.8 % (ref 37–47)
HGB BLD-MCNC: 8.7 G/DL (ref 12–16)
HGB BLD-MCNC: 8.9 G/DL (ref 12–16)
INR PPP: 1.51 (ref 0.87–1.13)
MCH RBC QN AUTO: 30.2 PG (ref 27–33)
MCH RBC QN AUTO: 30.5 PG (ref 27–33)
MCHC RBC AUTO-ENTMCNC: 32.8 G/DL (ref 33.6–35)
MCHC RBC AUTO-ENTMCNC: 33.2 G/DL (ref 33.6–35)
MCV RBC AUTO: 91.8 FL (ref 81.4–97.8)
MCV RBC AUTO: 92 FL (ref 81.4–97.8)
PLATELET # BLD AUTO: 378 K/UL (ref 164–446)
PLATELET # BLD AUTO: 407 K/UL (ref 164–446)
PMV BLD AUTO: 10.3 FL (ref 9–12.9)
PMV BLD AUTO: 9.9 FL (ref 9–12.9)
POTASSIUM SERPL-SCNC: 3.7 MMOL/L (ref 3.6–5.5)
PROT SERPL-MCNC: 6.2 G/DL (ref 6–8.2)
PROTHROMBIN TIME: 17.9 SEC (ref 12–14.6)
RBC # BLD AUTO: 2.88 M/UL (ref 4.2–5.4)
RBC # BLD AUTO: 2.92 M/UL (ref 4.2–5.4)
SODIUM SERPL-SCNC: 137 MMOL/L (ref 135–145)
WBC # BLD AUTO: 11.1 K/UL (ref 4.8–10.8)
WBC # BLD AUTO: 8.8 K/UL (ref 4.8–10.8)

## 2018-08-12 PROCEDURE — 85027 COMPLETE CBC AUTOMATED: CPT | Mod: 91

## 2018-08-12 PROCEDURE — 36415 COLL VENOUS BLD VENIPUNCTURE: CPT

## 2018-08-12 PROCEDURE — 85610 PROTHROMBIN TIME: CPT

## 2018-08-12 PROCEDURE — 99232 SBSQ HOSP IP/OBS MODERATE 35: CPT | Mod: GC | Performed by: HOSPITALIST

## 2018-08-12 PROCEDURE — A9270 NON-COVERED ITEM OR SERVICE: HCPCS | Performed by: STUDENT IN AN ORGANIZED HEALTH CARE EDUCATION/TRAINING PROGRAM

## 2018-08-12 PROCEDURE — 700111 HCHG RX REV CODE 636 W/ 250 OVERRIDE (IP): Performed by: HOSPITALIST

## 2018-08-12 PROCEDURE — 770020 HCHG ROOM/CARE - TELE (206)

## 2018-08-12 PROCEDURE — 700102 HCHG RX REV CODE 250 W/ 637 OVERRIDE(OP): Performed by: STUDENT IN AN ORGANIZED HEALTH CARE EDUCATION/TRAINING PROGRAM

## 2018-08-12 PROCEDURE — 80053 COMPREHEN METABOLIC PANEL: CPT

## 2018-08-12 PROCEDURE — 85730 THROMBOPLASTIN TIME PARTIAL: CPT

## 2018-08-12 PROCEDURE — 700111 HCHG RX REV CODE 636 W/ 250 OVERRIDE (IP): Performed by: STUDENT IN AN ORGANIZED HEALTH CARE EDUCATION/TRAINING PROGRAM

## 2018-08-12 RX ORDER — CALCIUM CARBONATE 500 MG/1
500 TABLET, CHEWABLE ORAL DAILY
Status: DISCONTINUED | OUTPATIENT
Start: 2018-08-12 | End: 2018-08-12

## 2018-08-12 RX ORDER — CALCIUM CARBONATE 500 MG/1
500 TABLET, CHEWABLE ORAL 2 TIMES DAILY
Status: DISCONTINUED | OUTPATIENT
Start: 2018-08-12 | End: 2018-08-13

## 2018-08-12 RX ORDER — WARFARIN SODIUM 10 MG/1
10 TABLET ORAL
Status: COMPLETED | OUTPATIENT
Start: 2018-08-12 | End: 2018-08-12

## 2018-08-12 RX ADMIN — Medication 2 TABLET: at 05:08

## 2018-08-12 RX ADMIN — IRON SUCROSE 200 MG: 20 INJECTION, SOLUTION INTRAVENOUS at 05:08

## 2018-08-12 RX ADMIN — VITAMIN D, TAB 1000IU (100/BT) 1000 UNITS: 25 TAB at 05:08

## 2018-08-12 RX ADMIN — VITAMIN D, TAB 1000IU (100/BT) 1000 UNITS: 25 TAB at 18:21

## 2018-08-12 RX ADMIN — ANTACID TABLETS 500 MG: 500 TABLET, CHEWABLE ORAL at 21:20

## 2018-08-12 RX ADMIN — WARFARIN SODIUM 10 MG: 10 TABLET ORAL at 18:43

## 2018-08-12 RX ADMIN — PAROXETINE HYDROCHLORIDE 20 MG: 20 TABLET, FILM COATED ORAL at 05:08

## 2018-08-12 RX ADMIN — HEPARIN SODIUM 1200 UNITS/HR: 5000 INJECTION, SOLUTION INTRAVENOUS at 05:05

## 2018-08-12 RX ADMIN — ANTACID TABLETS 500 MG: 500 TABLET, CHEWABLE ORAL at 08:22

## 2018-08-12 ASSESSMENT — PATIENT HEALTH QUESTIONNAIRE - PHQ9
2. FEELING DOWN, DEPRESSED, IRRITABLE, OR HOPELESS: NOT AT ALL
SUM OF ALL RESPONSES TO PHQ9 QUESTIONS 1 AND 2: 0
1. LITTLE INTEREST OR PLEASURE IN DOING THINGS: NOT AT ALL

## 2018-08-12 ASSESSMENT — ENCOUNTER SYMPTOMS
NEUROLOGICAL NEGATIVE: 1
EYES NEGATIVE: 1
CARDIOVASCULAR NEGATIVE: 1
CONSTITUTIONAL NEGATIVE: 1
MUSCULOSKELETAL NEGATIVE: 1
BRUISES/BLEEDS EASILY: 1
PSYCHIATRIC NEGATIVE: 1
GASTROINTESTINAL NEGATIVE: 1
RESPIRATORY NEGATIVE: 1

## 2018-08-12 ASSESSMENT — PAIN SCALES - GENERAL
PAINLEVEL_OUTOF10: 0

## 2018-08-12 NOTE — DISCHARGE SUMMARY
Internal Medicine Discharge Summary  Note Author: Arjun Horta M.D.       Name Nadia Lazaro 1965   Age/Sex 53 y.o. female   MRN [id number]         Admit Date:  2018       Discharge Date:   2018    Service:   [facility] Internal Medicine [place] Team  Attending Physician(s):   Dr Hernandez       Senior Resident(s):   Dr Horta  Chilo Resident(s):   Dr Singh  PCP: Elana Gillespie D.O.      Primary Diagnosis:   Hematoma of right hip        Secondary Diagnoses:                Principal Problem:    Hematoma of right hip POA: Yes      Overview: -CT 2018: Probable hematoma within the musculature posterior medial to       R hip with findings concerning for arterial hemorrhage in the R gluteal       muscle.      - Repeat CT abd/pelvis 2018 indicates hematoma has not changed in       size.  Active Problems:    Warfarin-induced coagulopathy (HCC) POA: Yes    Warfarin toxicity, accidental or unintentional, initial encounter POA: Yes    Anemia due to acute blood loss POA: Yes    MS (multiple sclerosis) (HCC) POA: Yes    Protein S deficiency (HCC) POA: Yes    Hypocalcemia POA: Unknown    Anxiety POA: Unknown  Resolved Problems:    * No resolved hospital problems. *      Hospital Summary (Brief Narrative):           53 year old female with past medical history of protein S deficiency (with hx of 11 DVT with one episode of PE, last DVT was )on chronic anticoagulation with coumadin who presents to the ED with complaint of right leg pain, she developed pain in her right leg all the way down to the calf which progressively worsened over the last few days before admission, denies any trauma but she been moving heavy boxes the week prior. On admission her INR was >10.  There have been no changes in her medications but there is a concern that the patient accidentally took twice the recommended dosages of warfarin. Right LE venous duplex in the ED was negative for DVT, CT abdomen/pelvis showed  hematoma within the right gluteus muscle, 5 mg vit K was given and later another 5 mg also was given with FFP, the day after her INR was 3.9 and she had some more pain, 2.5 mg vit K was given and CT was repeated and it did not show any worsening for hematoma. However due to the Vit K products, her discharge was delayed due to the low INR which required significant Warfarin and heparin GGT. Due to her goal INR 3-4 (multiple thromboembolic episodes when INR <2.5), patient was sent home on 7.5 mg of warfarin per day with a close post discharge coumadin clinic follow up. Patient's hospital course was also complicated by stable anemia due to acute blood loss.              Patient /Hospital Summary (Details -- Problem Oriented) :          Anemia due to acute blood loss   Assessment & Plan    Stable. Had most likely been dilutional given IVF.  - Hemodynamically stable.        Warfarin toxicity, accidental or unintentional, initial encounter   Assessment & Plan    DC with Warfarin 7.5 mg OD  F/U with coumadin clinic within the next 5 days.  Goal INR 3-4  VTE episodes when INR <2.5        Warfarin-induced coagulopathy (HCC)   Assessment & Plan    -chronically on  Warfarin with INR 3-4 at home.  Has had 11 DVT's in past, including one at INR 2.7 before.  - Had IVC filter in place from 3819-2873. Had it removed last year as it had migrated too high.  - Continue to monitor coagulation          * Hematoma of right hip   Assessment & Plan    - Hemodynamically stable, no signs of compartment syndrome.  Pain minimal in her leg at this point. Bruising along lateral leg does not indicate any new bleeding.  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   At goal INR 3-4      Anxiety   Assessment & Plan    Cont home Paxil 20mg daily.        Hypocalcemia   Assessment & Plan    - Cont home calcium carbonate 500mg BID.        Protein S deficiency (HCC)   Assessment & Plan    Diagnosed at age 16, on chronic  anticoagulation        MS (multiple sclerosis) ([facility])   Assessment & Plan    - Stable   -On Tysabri infusion [id number]  -baclofen intrathecal pump for back pain            Consultants:     none    Procedures:        none    Imaging/ Testing:      LE VENOUS DUPLEX (DVT)   Final Result      CT-ABDOMEN-PELVIS W/O   Final Result      1.  Induration along the medial edge of right kidney and around right renal pelvis and proximal ureter is again noted but less well seen on this noncontrast exam. No new hemorrhage or acute hemorrhage is appreciated in this region.      2.  Hemorrhage posterior to the right hip joint and deep to the gluteus musculature is again noted with no increase in size compared to prior exam.      3.  Collateral subcutaneous venous structures are again noted in the anterior perineal region and anterior pelvic region and are unchanged compared to the prior CT.      4.  No new abnormalities are identified.      CT-LSPINE W/O PLUS RECONS   Final Result      No lumbar spine fracture or subluxation.      CT-ABDOMEN-PELVIS WITH   Final Result      1.  Small amount of edema or hemorrhage in the RIGHT renal hilum with thickening of the RIGHT renal pelvis concerning for inflammation.   2.  No ureteral stone or hydronephrosis.   3.  Probable hematoma within the musculature posterior medial to RIGHT hip with findings concerning for arterial hemorrhage in the RIGHT gluteal muscle rupture.   4.  Stenosis of LEFT iliac vein with subcutaneous collaterals.   5.  Probable submucosal fibroid which distorts the endometrial canal.  Mass is not excluded.   6.  Bilateral ovarian cysts.      LE VENOUS DUPLEX (Specify in Comments Left, Right Or Bilateral)   Final Result            Discharge Medications:         Medication Reconciliation: Completed       Medication List      CHANGE how you take these medications      Instructions   warfarin 7.5 MG Tabs  What changed:  · medication strength  · how much to take  · additional  instructions  Commonly known as:  COUMADIN   Take 1 Tab by mouth every day.  Dose:  7.5 mg        CONTINUE taking these medications      Instructions   docosahexanoic acid 1000 MG Caps  Commonly known as:  OMEGA 3 FA   Take 1,000 mg by mouth every day.  Dose:  1000 mg     MULTIVITAMIN PO   Take  by mouth every day.     Pain Pump Yas  Commonly known as:  patient supplied   1 Each by Injection route Continuous. Baclofen 20MCG daily Bolas 10MCG/ 250MCG per ML Pt last filled on 7/10/2018  Dose:  1 Each     paroxetine 40 MG tablet  Commonly known as:  PAXIL   Take 1 Tab by mouth every day.  Dose:  40 mg     TYSABRI IV   1 Dose by Intravenous route Q 4 Weeks.  Dose:  1 Dose     vitamin D 1000 UNIT Tabs  Commonly known as:  cholecalciferol   Take 1,000 Units by mouth 2 Times a Day.  Dose:  1000 Units                Disposition:   Home    Diet:   Healthy, constant     Activity:   As tolerated with caution about any trauma on her R leg.     Instructions:      The patient was instructed to return to the ER in the event of worsening symptoms. I have counseled the patient on the importance of compliance and the patient has agreed to proceed with all medical recommendations and follow up plan indicated above.   The patient understands that all medications come with benefits and risks. Risks may include permanent injury or death and these risks can be minimized with close reassessment and monitoring.        Primary Care Provider:    Elana Gillespie D.O.    Discharge summary faxed to primary care provider:  Completed  Copy of discharge summary given to the patient: Completed      Follow up appointment details :      Elana Gillespie D.O.  [address] [id number] [phone]    In 1 week          Pending Studies:        NA    Time spent on discharge day patient visit, preparing discharge paperwork and arranging for patient follow up.    Summary of follow up issues:   Goal INR 3-4    Discharge Time (Minutes)  :    >45 mins  Hospital Course Type:  Inpatient Stay >2 midnights      Condition on Discharge    ______________________________________________________________________    Interval history/exam for day of discharge:     Denies any new complaints. Denies melena, nausea, hematemesis, abdominal pain, or worsening right thigh pain/swelling      Most Recent Labs:    Lab Results   Component Value Date/Time    WBC 11.1 (H) 08/12/2018 04:24 AM    RBC 2.92 (L) 08/12/2018 04:24 AM    HEMOGLOBIN 8.9 (L) 08/12/2018 04:24 AM    HEMATOCRIT 26.8 (L) 08/12/2018 04:24 AM    MCV 91.8 08/12/2018 04:24 AM    MCH 30.5 08/12/2018 04:24 AM    MCHC 33.2 (L) 08/12/2018 04:24 AM    MPV 10.3 08/12/2018 04:24 AM    NEUTSPOLYS 58.70 08/09/2018 06:01 AM    LYMPHOCYTES 26.10 08/09/2018 06:01 AM    MONOCYTES 10.80 08/09/2018 06:01 AM    EOSINOPHILS 3.10 08/09/2018 06:01 AM    BASOPHILS 0.60 08/09/2018 06:01 AM      Lab Results   Component Value Date/Time    SODIUM 137 08/12/2018 04:24 AM    POTASSIUM 3.7 08/12/2018 04:24 AM    CHLORIDE 105 08/12/2018 04:24 AM    CO2 25 08/12/2018 04:24 AM    GLUCOSE 101 (H) 08/12/2018 04:24 AM    BUN 11 08/12/2018 04:24 AM    CREATININE 0.75 08/12/2018 04:24 AM      Lab Results   Component Value Date/Time    ALTSGPT 6 08/12/2018 04:24 AM    ASTSGOT 12 08/12/2018 04:24 AM    ALKPHOSPHAT 63 08/12/2018 04:24 AM    TBILIRUBIN 0.9 08/12/2018 04:24 AM    ALBUMIN 3.5 08/12/2018 04:24 AM    GLOBULIN 2.7 08/12/2018 04:24 AM    INR 1.51 (H) 08/12/2018 04:24 AM     Lab Results   Component Value Date/Time    PROTHROMBTM 17.9 (H) 08/12/2018 04:24 AM    INR 1.51 (H) 08/12/2018 04:24 AM

## 2018-08-12 NOTE — PROGRESS NOTES
This writer has resumed care of patient for 1330-9242. No complaints on bedside rounding, pt verbalized understanding of fall prevention measures and hopes to ambulate around the unit this evening.    Will continue to monitor.  Carmelita Ring

## 2018-08-12 NOTE — PROGRESS NOTES
Found medicine bottle in pt's pink basin. Unlabeled. Pt states they are Tums. Pt stated she has been taking 2 per day since admit. MD paged

## 2018-08-12 NOTE — PROGRESS NOTES
Pt reporting overall radha, only complains of inability to regulate body temperatures but states this is normal for her.    Using call bell appropriately, ambulating well. CMS status intact on all extremities.    Will continue to monitor and assess.  Carmelita Ring

## 2018-08-12 NOTE — CARE PLAN
Problem: Safety  Goal: Will remain free from falls  Fall precautions in place. Bed wheels locked, bed is in the lowest position. Call light in reach. Bed alarm on and in place. Non-skid socks provided. Patient provides successful verbalization of fall and safety precautions and demonstration of use of call bell. Upper side rails are up. Hourly rounding in progress.     Problem: Knowledge Deficit  Goal: Knowledge of disease process/condition, treatment plan, diagnostic tests, and medications will improve  POC discussed with the patient. All questions and concerns addressed and answered. Patient is involved in POC and verbalizes the understanding of the disease process, medications, tests and treatments. Questions on POC encouraged throughout care.

## 2018-08-12 NOTE — PROGRESS NOTES
Received bedside report from PM nurse. Assumed patient care. Chart reviewed. Pt was resting in bed. A&O x 4. No concerns, complaints or distress. Patient denies pain. Heparin gtt in place. Large bruising to the right hip/leg area. CMS+. POC updated with pt and on the patient communication board. Bed locked and in the lowest position. Call light within reach. Tele box on. Will continue to monitor.

## 2018-08-12 NOTE — PROGRESS NOTES
Internal Medicine Interval Note  Note Author: Trinity Singh M.D.     Name Nadia Lazaro 1965   Age/Sex 53 y.o. female   MRN [id number]   Code Status DNR/DNI     After 5PM or if no immediate response to page, please call for cross-coverage  Attending/Team: David/Vladimir See Patient List for primary contact information  Call [phone] to page    1st Call - Day Intern (R1):   Samantha 2nd Call - Day Sr. Resident (R2/R3):   Cailin         Reason for interval visit  (Principal Problem)   R hip hematoma      Interval Problem Daily Status Update  (24 hours, problem oriented, brief subjective history, new lab/imaging data pertinent to that problem)     - INR 1.51 --> cont Heparin IV per protocol.      Review of Systems   Constitutional: Negative.    HENT: Negative.    Eyes: Negative.    Respiratory: Negative.    Cardiovascular: Negative.    Gastrointestinal: Negative.    Genitourinary: Negative.    Musculoskeletal: Negative.    Skin:        Bruising on lateral side of R hip   Neurological: Negative.    Endo/Heme/Allergies: Bruises/bleeds easily.   Psychiatric/Behavioral: Negative.        Disposition/Barriers to discharge:   Subtherapeutic INR and Heparin IV.     Consultants/Specialty  None      PCP: Elana Gillespie D.O.      Quality Measures  Quality-Core Measures   Reviewed items::  Labs reviewed, Medications reviewed and Radiology images reviewed  Ocampo catheter::  No Ocampo  DVT prophylaxis pharmacological::  Warfarin (Coumadin) and Heparin          Physical Exam       Vitals:    18 2041 18 2319 18 0440 18 0749   BP: (!) 98/55 129/72 115/57 123/69   Pulse: 81 80 74 93   Resp: 16 16 16 18   Temp: 36.7 °C (98.1 °F) 36.6 °C (97.8 °F) 36.6 °C (97.8 °F) 36.3 °C (97.4 °F)   SpO2: 97% 98% 99% 98%   Weight: 104.2 kg (229 lb 11.5 oz)      Height:         Body mass index is 33.92 kg/m². Weight: 104.2 kg (229 lb 11.5 oz)  Oxygen Therapy:  Pulse Oximetry: 98 %, O2  (LPM): 0, O2 Delivery: None (Room Air)    Physical Exam   Constitutional: She is oriented to person, place, and time and well-developed, well-nourished, and in no distress.   HENT:   Head: Normocephalic and atraumatic.   Eyes: Pupils are equal, round, and reactive to light. Conjunctivae and EOM are normal.   Neck: Normal range of motion. Neck supple.   Cardiovascular: Normal rate, regular rhythm, normal heart sounds and intact distal pulses.    Pulmonary/Chest: Effort normal and breath sounds normal.   Abdominal: Soft. Bowel sounds are normal.   Musculoskeletal: Normal range of motion.   R hip: warm to palpation along hip and lateral thigh; not firm; sensation intact, normal strength  R knee: mildly tender to palpation posteriorly, less tender than yesterday  Distal pedal pulses intact b/l, no pallor     Neurological: She is alert and oriented to person, place, and time. No cranial nerve deficit.   Skin: Skin is warm. No pallor.   Bruises along R lateral hip and thigh, slightly more pronounced today.     Lab Results   Component Value Date/Time    WBC 11.1 (H) 08/12/2018 04:24 AM    RBC 2.92 (L) 08/12/2018 04:24 AM    HEMOGLOBIN 8.9 (L) 08/12/2018 04:24 AM    HEMATOCRIT 26.8 (L) 08/12/2018 04:24 AM    MCV 91.8 08/12/2018 04:24 AM    MCH 30.5 08/12/2018 04:24 AM    MCHC 33.2 (L) 08/12/2018 04:24 AM    MPV 10.3 08/12/2018 04:24 AM    NEUTSPOLYS 58.70 08/09/2018 06:01 AM    LYMPHOCYTES 26.10 08/09/2018 06:01 AM    MONOCYTES 10.80 08/09/2018 06:01 AM    EOSINOPHILS 3.10 08/09/2018 06:01 AM    BASOPHILS 0.60 08/09/2018 06:01 AM      Lab Results   Component Value Date/Time    PROTHROMBTM 17.9 (H) 08/12/2018 04:24 AM    INR 1.51 (H) 08/12/2018 04:24 AM       Assessment/Plan     * Hematoma of right hip- (present on admission)   Assessment & Plan    - Hemodynamically stable, no signs of compartment syndrome.  Pain minimal and now just behind her knee. Bruising along lateral leg does not indicate any new bleeding.  - INR 1.05  --> Continue Heparin IV.  - Warfarin 10mg given.  - Continue to monitor for signs of compartment syndrome: paresthesias, pain out of proportion, weakness, decreased pulses.          Anemia due to acute blood loss- (present on admission)   Assessment & Plan    Hb 8.7 -> 8. Most likely dilutional given IVF bolus given when starting IV Heparin yesterday.  - Hemodynamically stable.  - Fe 15 -> start iron sucrose 200mg IV q5 days. Now day 4 of 5.        Warfarin toxicity, accidental or unintentional, initial encounter- (present on admission)   Assessment & Plan    Took a 10mg tablet instead of a 7.5mg tablet recently. This may have contributed to her acute bleeding.  - Cont INR checks, her baseline is 3-4  - INR 1.51 --> Cont Heparin IV.  - Warfarin 10mg given.        Warfarin-induced coagulopathy (HCC)- (present on admission)   Assessment & Plan    -chronically on  Warfarin with INR 3-4 at home.  Has had 11 DVT's in past, including one at INR 2.7 before.  - Had IVC filter in place from 7022-4411. Had it removed last year as it had perforated too high.  - Continue to monitor coagulation          Coagulopathy (HCC)   Assessment & Plan    Hx of 11 DVTs.   - Cont INR checks, her baseline is 3-4  - INR 1.26 --> Heparin IV started.  - Warfarin 10mg given.  - H&H bid  -follow coag panel for reversal of coagulopathy        Hypocalcemia   Assessment & Plan    Ca 8.1.  - Start home calcium carbonate 500mg bid.        Protein S deficiency (HCC)- (present on admission)   Assessment & Plan    Diagnosed at age 16, on chronic anticoagulation        MS (multiple sclerosis) (HCC)- (present on admission)   Assessment & Plan    - Stable   -On Tysabri infusion [id number]  -baclofen intrathecal pump for back pain          Close to goal INR, possible discharge tomorrow.

## 2018-08-12 NOTE — CARE PLAN
Problem: Safety  Goal: Will remain free from injury  Outcome: PROGRESSING AS EXPECTED  Pt able to ambulate well independently and is using call light appropriately when in need of help    Problem: Knowledge Deficit  Goal: Knowledge of the prescribed therapeutic regimen will improve  Outcome: PROGRESSING AS EXPECTED  Verbalizes understanding of plan of care and current medication regimen (heparin GTT)    Problem: Pain Management  Goal: Pain level will decrease to patient's comfort goal  Outcome: PROGRESSING AS EXPECTED  Pt reporting no pain at this time    Problem: Mobility  Goal: Risk for activity intolerance will decrease  Outcome: PROGRESSING AS EXPECTED  Pt tolerating ambulation well and expresses desire to ambulate around the unit

## 2018-08-13 PROBLEM — F41.9 ANXIETY: Status: ACTIVE | Noted: 2018-08-13

## 2018-08-13 LAB
ALBUMIN SERPL BCP-MCNC: 3.3 G/DL (ref 3.2–4.9)
ALBUMIN/GLOB SERPL: 1.4 G/DL
ALP SERPL-CCNC: 59 U/L (ref 30–99)
ALT SERPL-CCNC: 7 U/L (ref 2–50)
ANION GAP SERPL CALC-SCNC: 5 MMOL/L (ref 0–11.9)
APTT PPP: 122.7 SEC (ref 24.7–36)
APTT PPP: 161.3 SEC (ref 24.7–36)
APTT PPP: 235.5 SEC (ref 24.7–36)
AST SERPL-CCNC: 14 U/L (ref 12–45)
BILIRUB SERPL-MCNC: 0.8 MG/DL (ref 0.1–1.5)
BUN SERPL-MCNC: 11 MG/DL (ref 8–22)
CALCIUM SERPL-MCNC: 8.3 MG/DL (ref 8.5–10.5)
CHLORIDE SERPL-SCNC: 110 MMOL/L (ref 96–112)
CO2 SERPL-SCNC: 23 MMOL/L (ref 20–33)
CREAT SERPL-MCNC: 0.73 MG/DL (ref 0.5–1.4)
ERYTHROCYTE [DISTWIDTH] IN BLOOD BY AUTOMATED COUNT: 54.7 FL (ref 35.9–50)
ERYTHROCYTE [DISTWIDTH] IN BLOOD BY AUTOMATED COUNT: 55.4 FL (ref 35.9–50)
GLOBULIN SER CALC-MCNC: 2.3 G/DL (ref 1.9–3.5)
GLUCOSE SERPL-MCNC: 102 MG/DL (ref 65–99)
HCT VFR BLD AUTO: 27.6 % (ref 37–47)
HCT VFR BLD AUTO: 30.1 % (ref 37–47)
HGB BLD-MCNC: 9.1 G/DL (ref 12–16)
HGB BLD-MCNC: 9.5 G/DL (ref 12–16)
INR PPP: 1.94 (ref 0.87–1.13)
MCH RBC QN AUTO: 29.7 PG (ref 27–33)
MCH RBC QN AUTO: 30.6 PG (ref 27–33)
MCHC RBC AUTO-ENTMCNC: 31.6 G/DL (ref 33.6–35)
MCHC RBC AUTO-ENTMCNC: 33 G/DL (ref 33.6–35)
MCV RBC AUTO: 92.9 FL (ref 81.4–97.8)
MCV RBC AUTO: 94.1 FL (ref 81.4–97.8)
PLATELET # BLD AUTO: 389 K/UL (ref 164–446)
PLATELET # BLD AUTO: 414 K/UL (ref 164–446)
PMV BLD AUTO: 10.2 FL (ref 9–12.9)
PMV BLD AUTO: 10.6 FL (ref 9–12.9)
POTASSIUM SERPL-SCNC: 3.8 MMOL/L (ref 3.6–5.5)
PROT SERPL-MCNC: 5.6 G/DL (ref 6–8.2)
PROTHROMBIN TIME: 21.8 SEC (ref 12–14.6)
RBC # BLD AUTO: 2.97 M/UL (ref 4.2–5.4)
RBC # BLD AUTO: 3.2 M/UL (ref 4.2–5.4)
SODIUM SERPL-SCNC: 138 MMOL/L (ref 135–145)
WBC # BLD AUTO: 9.5 K/UL (ref 4.8–10.8)
WBC # BLD AUTO: 9.8 K/UL (ref 4.8–10.8)

## 2018-08-13 PROCEDURE — 36415 COLL VENOUS BLD VENIPUNCTURE: CPT

## 2018-08-13 PROCEDURE — 85027 COMPLETE CBC AUTOMATED: CPT | Mod: 91

## 2018-08-13 PROCEDURE — 700102 HCHG RX REV CODE 250 W/ 637 OVERRIDE(OP): Performed by: STUDENT IN AN ORGANIZED HEALTH CARE EDUCATION/TRAINING PROGRAM

## 2018-08-13 PROCEDURE — A9270 NON-COVERED ITEM OR SERVICE: HCPCS | Performed by: STUDENT IN AN ORGANIZED HEALTH CARE EDUCATION/TRAINING PROGRAM

## 2018-08-13 PROCEDURE — 85730 THROMBOPLASTIN TIME PARTIAL: CPT | Mod: 91

## 2018-08-13 PROCEDURE — 700111 HCHG RX REV CODE 636 W/ 250 OVERRIDE (IP): Performed by: HOSPITALIST

## 2018-08-13 PROCEDURE — 80053 COMPREHEN METABOLIC PANEL: CPT

## 2018-08-13 PROCEDURE — 700111 HCHG RX REV CODE 636 W/ 250 OVERRIDE (IP): Performed by: STUDENT IN AN ORGANIZED HEALTH CARE EDUCATION/TRAINING PROGRAM

## 2018-08-13 PROCEDURE — 99232 SBSQ HOSP IP/OBS MODERATE 35: CPT | Mod: GC | Performed by: HOSPITALIST

## 2018-08-13 PROCEDURE — 770020 HCHG ROOM/CARE - TELE (206)

## 2018-08-13 PROCEDURE — 85610 PROTHROMBIN TIME: CPT

## 2018-08-13 RX ORDER — CALCIUM CARBONATE 500 MG/1
500 TABLET, CHEWABLE ORAL 2 TIMES DAILY
Status: DISCONTINUED | OUTPATIENT
Start: 2018-08-13 | End: 2018-08-16 | Stop reason: HOSPADM

## 2018-08-13 RX ORDER — WARFARIN SODIUM 10 MG/1
10 TABLET ORAL
Status: COMPLETED | OUTPATIENT
Start: 2018-08-13 | End: 2018-08-13

## 2018-08-13 RX ADMIN — PAROXETINE HYDROCHLORIDE 20 MG: 20 TABLET, FILM COATED ORAL at 05:14

## 2018-08-13 RX ADMIN — ANTACID TABLETS 500 MG: 500 TABLET, CHEWABLE ORAL at 23:51

## 2018-08-13 RX ADMIN — VITAMIN D, TAB 1000IU (100/BT) 1000 UNITS: 25 TAB at 05:14

## 2018-08-13 RX ADMIN — HEPARIN SODIUM 1200 UNITS/HR: 5000 INJECTION, SOLUTION INTRAVENOUS at 02:11

## 2018-08-13 RX ADMIN — WARFARIN SODIUM 10 MG: 10 TABLET ORAL at 17:28

## 2018-08-13 RX ADMIN — IRON SUCROSE 200 MG: 20 INJECTION, SOLUTION INTRAVENOUS at 05:14

## 2018-08-13 RX ADMIN — VITAMIN D, TAB 1000IU (100/BT) 1000 UNITS: 25 TAB at 17:24

## 2018-08-13 ASSESSMENT — ENCOUNTER SYMPTOMS
DIARRHEA: 0
FEVER: 0
VOMITING: 0
PSYCHIATRIC NEGATIVE: 1
GASTROINTESTINAL NEGATIVE: 1
NAUSEA: 0
BRUISES/BLEEDS EASILY: 1
CARDIOVASCULAR NEGATIVE: 1
MYALGIAS: 1
CHILLS: 0
EYES NEGATIVE: 1
HEADACHES: 0
COUGH: 0
RESPIRATORY NEGATIVE: 1
DIZZINESS: 0
CONSTITUTIONAL NEGATIVE: 1
MUSCULOSKELETAL NEGATIVE: 1
NEUROLOGICAL NEGATIVE: 1
ABDOMINAL PAIN: 0
FALLS: 0
HEMOPTYSIS: 0

## 2018-08-13 ASSESSMENT — PAIN SCALES - GENERAL
PAINLEVEL_OUTOF10: 0

## 2018-08-13 ASSESSMENT — PATIENT HEALTH QUESTIONNAIRE - PHQ9
SUM OF ALL RESPONSES TO PHQ9 QUESTIONS 1 AND 2: 0
2. FEELING DOWN, DEPRESSED, IRRITABLE, OR HOPELESS: NOT AT ALL
1. LITTLE INTEREST OR PLEASURE IN DOING THINGS: NOT AT ALL

## 2018-08-13 NOTE — NON-PROVIDER
Internal Medicine Medical Student Note  Note Author: Leighann Jc, Student    Name Nadia Lazaro     1965   Age/Sex 53 y.o. female   MRN [id number]   Code Status Full              Reason for interval visit  (Principal Problem)   Hematoma of right hip    Interval Problem Daily Status Update  (problem status, last 24 hours, new history, new data )   Ms. Lazaro is feeling mild pain in her lower leg, which she attributes to the location of the blood pooling in the leg. She is up and out of bed as often as she can be. INR today is 1.94 and she does not feel extremely comfortable going home before her INR is above 2.5 especially because her INR test strips will not be delivered to her house until Wednesday. I was reminded she has clotted before at INR of 2.5, so we agreed we should get closer to 2.8 before she can go home.     Hemoglobin up to 9.5 from 8.7 yesterday. Leukocytosis resolving (9.8 today down from 12.6).  Of note, according to RN note this morning, the heparin at 3 AM was not properly adjusted and pharmacy notified the nurse. Most recent APTT is 235.5- will monitor.     Review of Systems   Constitutional: Negative for chills and fever.   HENT: Negative.    Eyes: Negative.    Respiratory: Negative for cough and hemoptysis.    Cardiovascular: Negative for chest pain and leg swelling (none that is abnormal for her).   Gastrointestinal: Negative for abdominal pain, diarrhea, nausea and vomiting.   Genitourinary: Negative for dysuria and urgency.   Musculoskeletal: Positive for myalgias. Negative for falls.   Neurological: Negative for dizziness and headaches.   Endo/Heme/Allergies: Bruises/bleeds easily.     Physical Exam       Vitals:    18 0000 18 0400 18 0800 18 1200   BP: (!) 98/57 (!) 99/57 119/58 105/47   Pulse: 82 81 84 75   Resp:    Temp: 36.6 °C (97.9 °F) 36.4 °C (97.5 °F) 36.8 °C (98.2 °F) 37.2 °C (98.9 °F)   SpO2: 97% 97% 97% 95%   Weight:        Height:         Body mass index is 33.4 kg/m². Weight: 102.6 kg (226 lb 3.1 oz)  Oxygen Therapy:  Pulse Oximetry: 95 %, O2 (LPM): 0, O2 Delivery: None (Room Air)    Physical Exam   Constitutional: She is oriented to person, place, and time.   Very pleasant patient in no distress, well developed, obese   HENT:   Head: Normocephalic and atraumatic.   Eyes: Pupils are equal, round, and reactive to light. EOM are normal.   Cardiovascular: Normal rate and regular rhythm.  Exam reveals no gallop and no friction rub.    No murmur heard.  Pulmonary/Chest: Effort normal and breath sounds normal. She has no wheezes. She has no rales.   Abdominal: Soft. Bowel sounds are normal. She exhibits no distension. There is no tenderness.   Musculoskeletal: She exhibits tenderness (mild- right lateral lower leg).   Distal pulses intact, sensation and strength normal in bilateral lower extremities. No signs of compartment syndrome    Neurological: She is alert and oriented to person, place, and time. No cranial nerve deficit.   Skin: Skin is warm and dry.   Area of ecchymosis encompassing all of right lower extremity from buttock to below the knee. Chronic venous stasis dermatitis and bruising of left lower extremity     Recent Labs      08/12/18   0424  08/12/18   1823  08/13/18   0534   HEMOGLOBIN  8.9*  8.7*  9.5*   HEMATOCRIT  26.8*  26.5*  30.1*   MCV  91.8  92.0  94.1   MCH  30.5  30.2  29.7   PLATELETCT  407  378  414      Ref. Range 8/13/2018 02:58 8/13/2018 07:57   PT Latest Ref Range: 12.0 - 14.6 sec 21.8 (H)    INR Latest Ref Range: 0.87 - 1.13  1.94 (H)    APTT Latest Ref Range: 24.7 - 36.0 sec 161.3 (HH) 235.5 (HH)      Ref. Range 8/11/2018 04:54 8/12/2018 04:24 8/13/2018 02:58   Sodium Latest Ref Range: 135 - 145 mmol/L 141 137 138   Potassium Latest Ref Range: 3.6 - 5.5 mmol/L 3.8 3.7 3.8   Chloride Latest Ref Range: 96 - 112 mmol/L 112 105 110   Co2 Latest Ref Range: 20 - 33 mmol/L 22 25 23   Anion Gap Latest Ref  Range: 0.0 - 11.9  7.0 7.0 5.0   Glucose Latest Ref Range: 65 - 99 mg/dL 100 (H) 101 (H) 102 (H)   Bun Latest Ref Range: 8 - 22 mg/dL 13 11 11   Creatinine Latest Ref Range: 0.50 - 1.40 mg/dL 0.64 0.75 0.73   GFR If  Latest Ref Range: >60 mL/min/1.73 m 2 >60 >60 >60   GFR If Non  Latest Ref Range: >60 mL/min/1.73 m 2 >60 >60 >60   Calcium Latest Ref Range: 8.5 - 10.5 mg/dL 8.1 (L) 8.4 (L) 8.3 (L)   AST(SGOT) Latest Ref Range: 12 - 45 U/L 12 12 14   ALT(SGPT) Latest Ref Range: 2 - 50 U/L 7 6 7   Alkaline Phosphatase Latest Ref Range: 30 - 99 U/L 53 63 59   Total Bilirubin Latest Ref Range: 0.1 - 1.5 mg/dL 0.7 0.9 0.8   Albumin Latest Ref Range: 3.2 - 4.9 g/dL 3.2 3.5 3.3   Total Protein Latest Ref Range: 6.0 - 8.2 g/dL 5.4 (L) 6.2 5.6 (L)   Globulin Latest Ref Range: 1.9 - 3.5 g/dL 2.2 2.7 2.3   A-G Ratio Latest Units: g/dL 1.5 1.3 1.4         Assessment/Plan     1) Hematoma of posterior medial hip/gluteal muscle  - Seen on CT on 8/6, INR >10, PT 97.9 on presentation to ED. Repeat CT showed no evidence of new/worsening bleeding  - Hgb dropped from 11 to 7.4 but baseline around 14, so patient received 1 unit pRBC's on 8/9. Hgb up to 9.5 today  - Iron panel was abnormal: Iron 15, TIBC 279, % saturation 5. Started IV iron- last day today  - TEG showed reaction time of 12.9, gave 2 units FFP and 5 mg vit K PO. Got additional 2.5 mg vit K PO, INR now 1.94  - Started heparin and warfarin  - INR too low to safely discharge patient with her history of protein S deficiency so we will wait until INR between 2.8 and 3.9 before she can go home. Monitor signs of new bleed or new clot.     2) Supratherapeutic INR and chronic anticoagulation  - INR 8/6 and 8/7 in the morning was >10   - Current INR is 1.94  - Patient takes warfarin 10 mg on Tuesdays and Saturdays, and 7.5 mg every other day of the week  - Protein S deficiency: patient's doctor set her goal INR for 3-4 (she has had DVT's at INR of  2.7)  - Patient was started on heparin drip and warfarin, will continue to monitor INR daily until it is stable between 2.8 and 3.9     3) Leukocytosis- trending down  - This morning her WBC count is down to 9.8  - There are no fevers, signs of infection in this patient  - Likely due to the stress response (leukemoid) from this painful episode but will continue to monitor     4) Anxiety  - Patient on Paxil for generalized anxiety at home  - Restarted Paxil at 20 mg     5) Multiple Sclerosis  - No relapses during this hospitalization    - Patient receives Tysabri IV every 8 weeks  - Has baclofen pump for back muscle spasms      Parts of assessment and plan copied from my previous note

## 2018-08-13 NOTE — PROGRESS NOTES
Subjective    Contacted by nursing for nightly Warfarin order.  Confirmed with primary team, and ordered 10 mg Warfarin.  Patient was stable, no acute events.  Vital signs were within normal limits.      Assessment/Plan  - 10 mg Warfarin for anticoagulation bridge.

## 2018-08-13 NOTE — CARE PLAN
Problem: Safety  Goal: Will remain free from injury  Outcome: PROGRESSING AS EXPECTED  Pt using call light appropriately, ambulates self well, verbalizes understanding of fall prevention measures.      Problem: Venous Thromboembolism (VTW)/Deep Vein Thrombosis (DVT) Prevention:  Goal: Patient will participate in Venous Thrombosis (VTE)/Deep Vein Thrombosis (DVT)Prevention Measures  Outcome: PROGRESSING AS EXPECTED  Pt on heparin gtt and PO coumadin but verbalizes understanding the importance of ambulation and ROM.     Problem: Knowledge Deficit  Goal: Knowledge of disease process/condition, treatment plan, diagnostic tests, and medications will improve  Outcome: PROGRESSING AS EXPECTED  Verbalizes understanding of plan of care and why medications and tests are indicated for her treatment

## 2018-08-13 NOTE — PROGRESS NOTES
Internal Medicine Interval Note  Note Author: Trinity Singh M.D.     Name Nadia Lazaro 1965   Age/Sex 53 y.o. female   MRN [id number]   Code Status DNR/DNI     After 5PM or if no immediate response to page, please call for cross-coverage  Attending/Team: David/Vladimir See Patient List for primary contact information  Call [phone] to page    1st Call - Day Intern (R1):   Samantha 2nd Call - Day Sr. Resident (R2/R3):   Mychal         Reason for interval visit  (Principal Problem)   R hip hematoma      Interval Problem Daily Status Update  (24 hours, problem oriented, brief subjective history, new lab/imaging data pertinent to that problem)     - INR 1.91 --> cont IV Heparin and warfarin 10mg to get to INR > 2.5.      Review of Systems   Constitutional: Negative.    HENT: Negative.    Eyes: Negative.    Respiratory: Negative.    Cardiovascular: Negative.    Gastrointestinal: Negative.    Genitourinary: Negative.    Musculoskeletal: Negative.    Skin:        Bruising on lateral side of R hip   Neurological: Negative.    Endo/Heme/Allergies: Bruises/bleeds easily.   Psychiatric/Behavioral: Negative.        Disposition/Barriers to discharge:   Subtherapeutic INR.    Consultants/Specialty  None      PCP: Elana Gillespie D.O.      Quality Measures  Quality-Core Measures   Reviewed items::  Labs reviewed, Medications reviewed and Radiology images reviewed  Ocampo catheter::  No Ocampo  DVT prophylaxis pharmacological::  Warfarin (Coumadin) and Heparin          Physical Exam       Vitals:    18 0000 18 0400 18 0800 18 1200   BP: (!) 98/57 (!) 99/57 119/58 105/47   Pulse: 82 81 84 75   Resp:  20   Temp: 36.6 °C (97.9 °F) 36.4 °C (97.5 °F) 36.8 °C (98.2 °F) 37.2 °C (98.9 °F)   SpO2: 97% 97% 97% 95%   Weight:       Height:         Body mass index is 33.4 kg/m². Weight: 102.6 kg (226 lb 3.1 oz)  Oxygen Therapy:  Pulse Oximetry: 95 %, O2 (LPM): 0, O2 Delivery:  None (Room Air)    Physical Exam   Constitutional: She is oriented to person, place, and time and well-developed, well-nourished, and in no distress.   HENT:   Head: Normocephalic and atraumatic.   Eyes: Pupils are equal, round, and reactive to light. Conjunctivae and EOM are normal.   Neck: Normal range of motion. Neck supple.   Cardiovascular: Normal rate, regular rhythm, normal heart sounds and intact distal pulses.    Pulmonary/Chest: Effort normal and breath sounds normal.   Abdominal: Soft. Bowel sounds are normal.   Musculoskeletal: Normal range of motion.   R hip: warm to palpation along hip and lateral thigh; not firm; sensation intact, normal strength  R knee: mildly tender to palpation posteriorly  Distal pedal pulses intact b/l, no pallor     Neurological: She is alert and oriented to person, place, and time. No cranial nerve deficit.   Skin: Skin is warm. No pallor.   Bruises along R lateral hip and thigh, slightly less pronounced today.     Lab Results   Component Value Date/Time    WBC 9.8 08/13/2018 05:34 AM    RBC 3.20 (L) 08/13/2018 05:34 AM    HEMOGLOBIN 9.5 (L) 08/13/2018 05:34 AM    HEMATOCRIT 30.1 (L) 08/13/2018 05:34 AM    MCV 94.1 08/13/2018 05:34 AM    MCH 29.7 08/13/2018 05:34 AM    MCHC 31.6 (L) 08/13/2018 05:34 AM    MPV 10.2 08/13/2018 05:34 AM    NEUTSPOLYS 58.70 08/09/2018 06:01 AM    LYMPHOCYTES 26.10 08/09/2018 06:01 AM    MONOCYTES 10.80 08/09/2018 06:01 AM    EOSINOPHILS 3.10 08/09/2018 06:01 AM    BASOPHILS 0.60 08/09/2018 06:01 AM      Lab Results   Component Value Date/Time    PROTHROMBTM 21.8 (H) 08/13/2018 02:58 AM    INR 1.94 (H) 08/13/2018 02:58 AM       Assessment/Plan     * Hematoma of right hip- (present on admission)   Assessment & Plan    - Hemodynamically stable, no signs of compartment syndrome.  Pain minimal in her leg at this point. Bruising along lateral leg does not indicate any new bleeding.  - INR 1.91 --> Continue Heparin IV.  - Warfarin 10mg qhs.  - Continue  to monitor for signs of compartment syndrome: paresthesias, pain out of proportion, weakness, decreased pulses.          Anemia due to acute blood loss- (present on admission)   Assessment & Plan    Hb 8.7 -> 9.5, resolving. Had most likely been dilutional given IVF.  - Hemodynamically stable.  - Iron sucrose regimen completed today.        Warfarin toxicity, accidental or unintentional, initial encounter- (present on admission)   Assessment & Plan    Took a 10mg tablet instead of a 7.5mg tablet recently. This may have contributed to her acute bleeding.  - Cont INR checks, her baseline is 3-4.  - INR 1.91 --> still subtherapeutic compared to her baseline. Cont Heparin IV.  - Warfarin 10mg qhs.        Warfarin-induced coagulopathy (HCC)- (present on admission)   Assessment & Plan    -chronically on  Warfarin with INR 3-4 at home.  Has had 11 DVT's in past, including one at INR 2.7 before.  - Had IVC filter in place from [id number]. Had it removed last year as it had perforated too high.  - Continue to monitor coagulation          Coagulopathy (HCC)   Assessment & Plan    Hx of 11 DVTs.   - Cont INR checks, her baseline is 3-4  - INR 1.26 --> Heparin IV started.  - Warfarin 10mg given.  - H&H bid  -follow coag panel for reversal of coagulopathy        Anxiety   Assessment & Plan    Cont home Paxil 20mg daily.        Hypocalcemia   Assessment & Plan    Ca 8.3.  - Cont home calcium carbonate 500mg BID.        Protein S deficiency (HCC)- (present on admission)   Assessment & Plan    Diagnosed at age 16, on chronic anticoagulation        MS (multiple sclerosis) (HCC)- (present on admission)   Assessment & Plan    - Stable   -On Tysabri infusion [id number]  -baclofen intrathecal pump for back pain          Close to goal INR, possible discharge tomorrow.

## 2018-08-13 NOTE — PROGRESS NOTES
Writer notified by lab of critical APTT, Heparin drip adjusted per heparin weight based policy.     Results for SHABBIR CARMEN (MRN [id number]) as of 8/13/2018 03:53   Ref. Range 8/13/2018 02:58   APTT Latest Ref Range: 24.7 - 36.0 sec 161.3 ()       Carmelita Ring RN

## 2018-08-13 NOTE — PROGRESS NOTES
"Writer assumed care of pt for [id number]. No complaints during bedside handoff, pt states she feels \"stir crazy\", encouraged ambulation around unit.    Heparin rate verified x2 RNs.     Fall prevention measures reinforced.    Will continue to monitor and assess.  Carmelita Ring    "

## 2018-08-13 NOTE — PROGRESS NOTES
Pt reporting she's overall feeling well, is looking forward to discharge though. No complaints of pain, ROS benign.  She has verbalized understanding of plan of care, INR and aPTT tests, and coumadin bridge.    Will continue to monitor and assess.  Carmelita Ring RN

## 2018-08-13 NOTE — PROGRESS NOTES
Report received.  Assumed Care. Assessment performed. All LDL assessed. Pt in bed, no family present. AOx4, responds appropriately.  Denies pain. No signs of distress, no SOB.  Plan of care discussed.  Explained importance of calling before getting OOB and pt verbalizes understanding.  Call light and belongings within reach, treaded slipper socks on,bed in lowest position.

## 2018-08-13 NOTE — PROGRESS NOTES
Heparin verified with Night RN. Hepatin at 3 am was not adjusted correctly. Pharmacy called. Charge RN notified.  New aptt drawn and will adjust based on new aptt results.

## 2018-08-14 ENCOUNTER — APPOINTMENT (OUTPATIENT)
Dept: MEDICAL GROUP | Facility: PHYSICIAN GROUP | Age: 53
End: 2018-08-14
Payer: MEDICARE

## 2018-08-14 LAB
APTT PPP: 68.8 SEC (ref 24.7–36)
APTT PPP: 88.2 SEC (ref 24.7–36)
ERYTHROCYTE [DISTWIDTH] IN BLOOD BY AUTOMATED COUNT: 55.4 FL (ref 35.9–50)
ERYTHROCYTE [DISTWIDTH] IN BLOOD BY AUTOMATED COUNT: 55.7 FL (ref 35.9–50)
HCT VFR BLD AUTO: 27.9 % (ref 37–47)
HCT VFR BLD AUTO: 30.8 % (ref 37–47)
HGB BLD-MCNC: 9 G/DL (ref 12–16)
HGB BLD-MCNC: 9.9 G/DL (ref 12–16)
INR PPP: 2.09 (ref 0.87–1.13)
MCH RBC QN AUTO: 30.3 PG (ref 27–33)
MCH RBC QN AUTO: 30.6 PG (ref 27–33)
MCHC RBC AUTO-ENTMCNC: 32.1 G/DL (ref 33.6–35)
MCHC RBC AUTO-ENTMCNC: 32.3 G/DL (ref 33.6–35)
MCV RBC AUTO: 94.2 FL (ref 81.4–97.8)
MCV RBC AUTO: 94.9 FL (ref 81.4–97.8)
PLATELET # BLD AUTO: 395 K/UL (ref 164–446)
PLATELET # BLD AUTO: 410 K/UL (ref 164–446)
PMV BLD AUTO: 10.3 FL (ref 9–12.9)
PMV BLD AUTO: 10.6 FL (ref 9–12.9)
PROTHROMBIN TIME: 23.2 SEC (ref 12–14.6)
RBC # BLD AUTO: 2.94 M/UL (ref 4.2–5.4)
RBC # BLD AUTO: 3.27 M/UL (ref 4.2–5.4)
WBC # BLD AUTO: 8.9 K/UL (ref 4.8–10.8)
WBC # BLD AUTO: 9.3 K/UL (ref 4.8–10.8)

## 2018-08-14 PROCEDURE — 770020 HCHG ROOM/CARE - TELE (206)

## 2018-08-14 PROCEDURE — 85730 THROMBOPLASTIN TIME PARTIAL: CPT

## 2018-08-14 PROCEDURE — 85027 COMPLETE CBC AUTOMATED: CPT | Mod: 91

## 2018-08-14 PROCEDURE — 700102 HCHG RX REV CODE 250 W/ 637 OVERRIDE(OP): Performed by: STUDENT IN AN ORGANIZED HEALTH CARE EDUCATION/TRAINING PROGRAM

## 2018-08-14 PROCEDURE — 99232 SBSQ HOSP IP/OBS MODERATE 35: CPT | Mod: GC | Performed by: HOSPITALIST

## 2018-08-14 PROCEDURE — A9270 NON-COVERED ITEM OR SERVICE: HCPCS | Performed by: STUDENT IN AN ORGANIZED HEALTH CARE EDUCATION/TRAINING PROGRAM

## 2018-08-14 PROCEDURE — 36415 COLL VENOUS BLD VENIPUNCTURE: CPT

## 2018-08-14 PROCEDURE — 700111 HCHG RX REV CODE 636 W/ 250 OVERRIDE (IP): Performed by: HOSPITALIST

## 2018-08-14 RX ORDER — WARFARIN SODIUM 10 MG/1
10 TABLET ORAL
Status: COMPLETED | OUTPATIENT
Start: 2018-08-14 | End: 2018-08-14

## 2018-08-14 RX ADMIN — VITAMIN D, TAB 1000IU (100/BT) 1000 UNITS: 25 TAB at 05:27

## 2018-08-14 RX ADMIN — VITAMIN D, TAB 1000IU (100/BT) 1000 UNITS: 25 TAB at 17:50

## 2018-08-14 RX ADMIN — PAROXETINE HYDROCHLORIDE 20 MG: 20 TABLET, FILM COATED ORAL at 05:26

## 2018-08-14 RX ADMIN — ANTACID TABLETS 500 MG: 500 TABLET, CHEWABLE ORAL at 17:50

## 2018-08-14 RX ADMIN — WARFARIN SODIUM 10 MG: 10 TABLET ORAL at 17:50

## 2018-08-14 RX ADMIN — HEPARIN SODIUM 550 UNITS/HR: 5000 INJECTION, SOLUTION INTRAVENOUS at 16:13

## 2018-08-14 ASSESSMENT — ENCOUNTER SYMPTOMS
ABDOMINAL PAIN: 0
VOMITING: 0
DIZZINESS: 0
PSYCHIATRIC NEGATIVE: 1
CONSTITUTIONAL NEGATIVE: 1
MUSCULOSKELETAL NEGATIVE: 1
NAUSEA: 0
EYES NEGATIVE: 1
CLAUDICATION: 0
DIARRHEA: 0
RESPIRATORY NEGATIVE: 1
MYALGIAS: 1
CARDIOVASCULAR NEGATIVE: 1
BRUISES/BLEEDS EASILY: 1
CHILLS: 0
GASTROINTESTINAL NEGATIVE: 1
COUGH: 0
HEADACHES: 0
SHORTNESS OF BREATH: 0
NERVOUS/ANXIOUS: 0
NEUROLOGICAL NEGATIVE: 1
CONSTIPATION: 0
WEAKNESS: 0
FEVER: 0
HEMOPTYSIS: 0

## 2018-08-14 ASSESSMENT — PAIN SCALES - GENERAL
PAINLEVEL_OUTOF10: 0

## 2018-08-14 NOTE — PROGRESS NOTES
Internal Medicine Interval Note  Note Author: Trinity Singh M.D.     Name Nadia Lazaro 1965   Age/Sex 53 y.o. female   MRN [id number]   Code Status DNR/DNI     After 5PM or if no immediate response to page, please call for cross-coverage  Attending/Team: David/Vladimir See Patient List for primary contact information  Call [phone] to page    1st Call - Day Intern (R1):   Samantha 2nd Call - Day Sr. Resident (R2/R3):   Mychal         Reason for interval visit  (Principal Problem)   R hip hematoma      Interval Problem Daily Status Update  (24 hours, problem oriented, brief subjective history, new lab/imaging data pertinent to that problem)     - INR 2.09 --> cont IV Heparin and warfarin 10mg to get to INR > 2.5.      Review of Systems   Constitutional: Negative.    HENT: Negative.    Eyes: Negative.    Respiratory: Negative.    Cardiovascular: Negative.    Gastrointestinal: Negative.    Genitourinary: Negative.    Musculoskeletal: Negative.    Skin:        Bruising on lateral side of R hip   Neurological: Negative.    Endo/Heme/Allergies: Bruises/bleeds easily.   Psychiatric/Behavioral: Negative.        Disposition/Barriers to discharge:   Subtherapeutic INR.    Consultants/Specialty  None      PCP: Elana Gillespie D.O.      Quality Measures  Quality-Core Measures   Reviewed items::  Labs reviewed, Medications reviewed and Radiology images reviewed  Ocampo catheter::  No Ocampo  DVT prophylaxis pharmacological::  Warfarin (Coumadin) and Heparin          Physical Exam       Vitals:    18 0400 18 0735 18 1147 18 1620   BP: 108/40 (!) 97/57 102/61 102/57   Pulse: 68 75 96 85   Resp: 16 18 16 16   Temp: 36.5 °C (97.7 °F) 36.4 °C (97.6 °F) 36.6 °C (97.9 °F) 36.4 °C (97.6 °F)   SpO2: 94% 97% 98% 92%   Weight:       Height:         Body mass index is 33.7 kg/m². Weight: 103.5 kg (228 lb 2.8 oz)  Oxygen Therapy:  Pulse Oximetry: 92 %, O2 (LPM): 0, O2 Delivery:  None (Room Air)    Physical Exam   Constitutional: She is oriented to person, place, and time and well-developed, well-nourished, and in no distress.   HENT:   Head: Normocephalic and atraumatic.   Eyes: Pupils are equal, round, and reactive to light. Conjunctivae and EOM are normal.   Neck: Normal range of motion. Neck supple.   Cardiovascular: Normal rate, regular rhythm, normal heart sounds and intact distal pulses.    Pulmonary/Chest: Effort normal and breath sounds normal.   Abdominal: Soft. Bowel sounds are normal.   Musculoskeletal: Normal range of motion.   R hip: somewhat warm to palpation along hip and lateral thigh; not firm; sensation intact, normal strength  R knee: no longer tender to palpation posteriorly  Distal pedal pulses intact b/l, no pallor     Neurological: She is alert and oriented to person, place, and time. No cranial nerve deficit.   Skin: Skin is warm. No pallor.   Bruises along R lateral hip and thigh, slightly less pronounced today.     Lab Results   Component Value Date/Time    WBC 9.3 08/14/2018 06:08 AM    RBC 3.27 (L) 08/14/2018 06:08 AM    HEMOGLOBIN 9.9 (L) 08/14/2018 06:08 AM    HEMATOCRIT 30.8 (L) 08/14/2018 06:08 AM    MCV 94.2 08/14/2018 06:08 AM    MCH 30.3 08/14/2018 06:08 AM    MCHC 32.1 (L) 08/14/2018 06:08 AM    MPV 10.3 08/14/2018 06:08 AM    NEUTSPOLYS 58.70 08/09/2018 06:01 AM    LYMPHOCYTES 26.10 08/09/2018 06:01 AM    MONOCYTES 10.80 08/09/2018 06:01 AM    EOSINOPHILS 3.10 08/09/2018 06:01 AM    BASOPHILS 0.60 08/09/2018 06:01 AM      Lab Results   Component Value Date/Time    PROTHROMBTM 23.2 (H) 08/13/2018 11:52 PM    INR 2.09 (H) 08/13/2018 11:52 PM       Assessment/Plan     * Hematoma of right hip- (present on admission)   Assessment & Plan    - Hemodynamically stable, no signs of compartment syndrome.  Pain minimal in her leg at this point. Bruising along lateral leg does not indicate any new bleeding.  - INR 2.09 --> Continue Heparin IV.  - Warfarin 10mg  qhs.  - Continue to monitor for signs of compartment syndrome: paresthesias, pain out of proportion, weakness, decreased pulses.          Anemia due to acute blood loss- (present on admission)   Assessment & Plan    Hb 9.1 -> 9.9, resolving. Had most likely been dilutional given IVF.  - Hemodynamically stable.        Warfarin toxicity, accidental or unintentional, initial encounter- (present on admission)   Assessment & Plan    Took a 10mg tablet instead of a 7.5mg tablet recently. This may have contributed to her acute bleeding.  - Cont INR checks, her baseline is 3-4.  - INR 2.09 --> still subtherapeutic compared to her baseline. Cont Heparin IV.  - Warfarin 10mg qhs.        Warfarin-induced coagulopathy (HCC)- (present on admission)   Assessment & Plan    -chronically on  Warfarin with INR 3-4 at home.  Has had 11 DVT's in past, including one at INR 2.7 before.  - Had IVC filter in place from 5943-1171. Had it removed last year as it had perforated too high.  - Continue to monitor coagulation          Coagulopathy (HCC)   Assessment & Plan    Hx of 11 DVTs.   - Cont INR checks, her baseline is 3-4  - INR 2.09 --> Cont Heparin IV  - Warfarin 10mg given.  - H&H bid  - follow coag panel for reversal of coagulopathy        Anxiety   Assessment & Plan    Cont home Paxil 20mg daily.        Hypocalcemia   Assessment & Plan    Ca 8.3.  - Cont home calcium carbonate 500mg BID.        Protein S deficiency (HCC)- (present on admission)   Assessment & Plan    Diagnosed at age 16, on chronic anticoagulation        MS (multiple sclerosis) (HCC)- (present on admission)   Assessment & Plan    - Stable   -On Tysabri infusion [id number]  -baclofen intrathecal pump for back pain          Close to goal INR, possible discharge tomorrow.

## 2018-08-14 NOTE — CARE PLAN
Problem: Safety  Goal: Will remain free from injury  Outcome: PROGRESSING AS EXPECTED  Pt educated about calling before getting out of bed. Call light and personal belongings within the reach. Bed in lowest position, treaded socks on.     Problem: Knowledge Deficit  Goal: Knowledge of disease process/condition, treatment plan, diagnostic tests, and medications will improve  Outcome: PROGRESSING AS EXPECTED

## 2018-08-14 NOTE — PROGRESS NOTES
Report received.  Assumed Care. Assessment performed. All LDL assessed. Pt in bed, no family present . AOx4, responds appropriately.  Denies pain. No signs of distress, no SOB.  Plan of care discussed.  Explained importance of calling before getting OOB and pt verbalizes understanding.  Call light and belongings within reach, treaded slipper socks on,  bed in lowest position.

## 2018-08-14 NOTE — PROGRESS NOTES
Midnight APTT 88.2; no adjustment necessary on heparin gtt per weight based protocol.     Carmelita Rign RN

## 2018-08-14 NOTE — NON-PROVIDER
Internal Medicine Medical Student Note  Note Author: Leighann Jc, Student    Name Nadia Lazaro     1965   Age/Sex 53 y.o. female   MRN [id number]   Code Status Full         Reason for interval visit  (Principal Problem)   Hematoma of right hip    Interval Problem Daily Status Update  (problem status, last 24 hours, new history, new data )   No acute changes in Ms. Lazaro overnight. Her pain is minimal at this point and the bruising present in the right lower extremity is progressing as expected- color changes to yellow/green consistent with breakdown of old blood. The only complaint she has at this time is about her buttocks being sore from laying in bed for so many days. That said, she does not wish to go home before her INR is therapeutic for her (2.8 to 3.9) as she knows she will just have to return to the hospital with a clot if she goes home too soon.     Review of Systems   Constitutional: Negative for chills, fever and malaise/fatigue.   HENT: Negative.    Eyes: Negative.    Respiratory: Negative for cough, hemoptysis and shortness of breath.    Cardiovascular: Positive for leg swelling (mild- normal for her). Negative for chest pain and claudication.   Gastrointestinal: Negative for abdominal pain, constipation, diarrhea, nausea and vomiting.   Genitourinary: Negative for dysuria, frequency and urgency.   Musculoskeletal: Positive for myalgias (mild).   Skin: Positive for rash (venous stasis dermatitis- chronic issue).   Neurological: Negative for dizziness, weakness and headaches.   Endo/Heme/Allergies: Bruises/bleeds easily.   Psychiatric/Behavioral: The patient is not nervous/anxious (restarted on Paxil ).        Physical Exam       Vitals:    18 0000 18 0400 18 0735 18 1147   BP: 107/59 108/40 (!) 97/57 102/61   Pulse: 82 68 75 96   Resp: 17 16 18 16   Temp: 36.4 °C (97.6 °F) 36.5 °C (97.7 °F) 36.4 °C (97.6 °F) 36.6 °C (97.9 °F)   SpO2: 99% 94% 97% 98%    Weight:       Height:         Body mass index is 33.7 kg/m². Weight: 103.5 kg (228 lb 2.8 oz)  Oxygen Therapy:  Pulse Oximetry: 98 %, O2 (LPM): 0, O2 Delivery: None (Room Air)    Physical Exam   Vitals reviewed.  Constitutional: She is oriented to person, place, and time.   Very pleasant patient in no distress, well developed, obese   HENT:   Head: Normocephalic and atraumatic.   Eyes: Pupils are equal, round, and reactive to light. EOM are normal.   Cardiovascular: Normal rate and regular rhythm.  Exam reveals no gallop and no friction rub.    No murmur heard.  Pulmonary/Chest: Effort normal and breath sounds normal. She has no wheezes. She has no rales.   Abdominal: Soft. Bowel sounds are normal. She exhibits no distension. There is no tenderness.   Musculoskeletal: She exhibits tenderness (minimal- only right lateral lower leg with palpation)   Distal pulses intact, sensation and strength normal in bilateral lower extremities. No signs of compartment syndrome    Neurological: She is alert and oriented to person, place, and time. No cranial nerve deficit.   Skin: Skin is warm and dry.   Area of ecchymosis encompassing all of right lower extremity from buttock to below the knee- yellow/green in color and some areas of purple ecchymosis. Progressing as expected. Chronic venous stasis dermatitis and bruising of left lower extremity       Assessment/Plan     1) Hematoma of posterior medial hip/gluteal muscle  - Seen on CT on 8/6, INR >10, PT 97.9 on presentation to ED. Repeat CT showed no evidence of new/worsening bleeding  - Hgb dropped from 11 to 7.4 but baseline around 14, so patient received 1 unit pRBC's on 8/9. Hgb up to 9.5 today  - Iron panel was abnormal: Iron 15, TIBC 279, % saturation 5. Started IV iron- last day today  - TEG showed reaction time of 12.9, gave 2 units FFP and 5 mg vit K PO. Got additional 2.5 mg vit K PO, INR now 1.94  - Started heparin and warfarin  - INR too low to safely discharge  patient with her history of protein S deficiency so we will wait until INR between 2.8 and 3.9 before she can go home. Monitor signs of new bleed or new clot.     2) Supratherapeutic INR and chronic anticoagulation  - INR 8/6 and 8/7 in the morning was >10   - Current INR is 2.09  - Patient takes warfarin 10 mg on Tuesdays and Saturdays, and 7.5 mg every other day of the week  - Protein S deficiency: patient's doctor set her goal INR for 3-4 (she has had DVT's at INR of 2.7)  - Patient was started on heparin drip and warfarin, will continue to monitor INR daily until it is stable between 2.8 and 3.9     3) Leukocytosis- trending down  - This morning her WBC count is down to 9.3  - There are no fevers, signs of infection in this patient  - Likely due to the stress response (leukemoid) from this painful episode but will continue to monitor     4) Anxiety  - Patient on Paxil for generalized anxiety at home  - This hospitalization, has been on Paxil at 20 mg once per day     5) Multiple Sclerosis  - No relapses during this hospitalization    - Patient receives Tysabri IV every 8 weeks  - Has baclofen pump for back muscle spasms      Parts of assessment and plan copied from my previous note

## 2018-08-14 NOTE — PROGRESS NOTES
Pt sleeping comfortably overnight, no complaints. CMS status intact in all extremities.   Reports she's anxious to go home.   Tolerates ambulation well, using call bell appropriately. Fall prevention measures reinforced.    Pt did request to have Tums at night, off scheduled time. Requests that they are available to her PRN.     Will continue to monitor and assess.  Carmelita Ring RN

## 2018-08-15 ENCOUNTER — TELEPHONE (OUTPATIENT)
Dept: MEDICAL GROUP | Facility: PHYSICIAN GROUP | Age: 53
End: 2018-08-15

## 2018-08-15 LAB
APTT PPP: 73.4 SEC (ref 24.7–36)
ERYTHROCYTE [DISTWIDTH] IN BLOOD BY AUTOMATED COUNT: 57 FL (ref 35.9–50)
ERYTHROCYTE [DISTWIDTH] IN BLOOD BY AUTOMATED COUNT: 57.1 FL (ref 35.9–50)
HCT VFR BLD AUTO: 30.3 % (ref 37–47)
HCT VFR BLD AUTO: 32.7 % (ref 37–47)
HGB BLD-MCNC: 10.4 G/DL (ref 12–16)
HGB BLD-MCNC: 9.4 G/DL (ref 12–16)
INR PPP: 2.38 (ref 0.87–1.13)
INR PPP: 2.73 (ref 0.87–1.13)
MCH RBC QN AUTO: 29.5 PG (ref 27–33)
MCH RBC QN AUTO: 30.3 PG (ref 27–33)
MCHC RBC AUTO-ENTMCNC: 31 G/DL (ref 33.6–35)
MCHC RBC AUTO-ENTMCNC: 31.8 G/DL (ref 33.6–35)
MCV RBC AUTO: 95 FL (ref 81.4–97.8)
MCV RBC AUTO: 95.3 FL (ref 81.4–97.8)
PLATELET # BLD AUTO: 411 K/UL (ref 164–446)
PLATELET # BLD AUTO: 458 K/UL (ref 164–446)
PMV BLD AUTO: 10.3 FL (ref 9–12.9)
PMV BLD AUTO: 10.3 FL (ref 9–12.9)
PROTHROMBIN TIME: 25.7 SEC (ref 12–14.6)
PROTHROMBIN TIME: 28.6 SEC (ref 12–14.6)
RBC # BLD AUTO: 3.19 M/UL (ref 4.2–5.4)
RBC # BLD AUTO: 3.43 M/UL (ref 4.2–5.4)
WBC # BLD AUTO: 11.7 K/UL (ref 4.8–10.8)
WBC # BLD AUTO: 12.3 K/UL (ref 4.8–10.8)

## 2018-08-15 PROCEDURE — 700102 HCHG RX REV CODE 250 W/ 637 OVERRIDE(OP): Performed by: STUDENT IN AN ORGANIZED HEALTH CARE EDUCATION/TRAINING PROGRAM

## 2018-08-15 PROCEDURE — 85027 COMPLETE CBC AUTOMATED: CPT

## 2018-08-15 PROCEDURE — 85610 PROTHROMBIN TIME: CPT

## 2018-08-15 PROCEDURE — 85730 THROMBOPLASTIN TIME PARTIAL: CPT

## 2018-08-15 PROCEDURE — 36415 COLL VENOUS BLD VENIPUNCTURE: CPT

## 2018-08-15 PROCEDURE — 99232 SBSQ HOSP IP/OBS MODERATE 35: CPT | Mod: GC | Performed by: HOSPITALIST

## 2018-08-15 PROCEDURE — 770020 HCHG ROOM/CARE - TELE (206)

## 2018-08-15 PROCEDURE — A9270 NON-COVERED ITEM OR SERVICE: HCPCS | Performed by: STUDENT IN AN ORGANIZED HEALTH CARE EDUCATION/TRAINING PROGRAM

## 2018-08-15 RX ORDER — WARFARIN SODIUM 10 MG/1
10 TABLET ORAL
Status: DISCONTINUED | OUTPATIENT
Start: 2018-08-15 | End: 2018-08-15

## 2018-08-15 RX ORDER — WARFARIN SODIUM 5 MG/1
12.5 TABLET ORAL
Status: COMPLETED | OUTPATIENT
Start: 2018-08-15 | End: 2018-08-15

## 2018-08-15 RX ORDER — PAROXETINE HYDROCHLORIDE 40 MG/1
40 TABLET, FILM COATED ORAL DAILY
Qty: 90 TAB | Refills: 2 | Status: ON HOLD | OUTPATIENT
Start: 2018-08-15 | End: 2018-11-15

## 2018-08-15 RX ADMIN — VITAMIN D, TAB 1000IU (100/BT) 1000 UNITS: 25 TAB at 17:30

## 2018-08-15 RX ADMIN — ANTACID TABLETS 500 MG: 500 TABLET, CHEWABLE ORAL at 20:37

## 2018-08-15 RX ADMIN — VITAMIN D, TAB 1000IU (100/BT) 1000 UNITS: 25 TAB at 05:40

## 2018-08-15 RX ADMIN — WARFARIN SODIUM 12.5 MG: 5 TABLET ORAL at 17:30

## 2018-08-15 RX ADMIN — PAROXETINE HYDROCHLORIDE 20 MG: 20 TABLET, FILM COATED ORAL at 05:40

## 2018-08-15 ASSESSMENT — PAIN SCALES - GENERAL
PAINLEVEL_OUTOF10: 0

## 2018-08-15 ASSESSMENT — ENCOUNTER SYMPTOMS
GASTROINTESTINAL NEGATIVE: 1
PSYCHIATRIC NEGATIVE: 1
EYES NEGATIVE: 1
CONSTITUTIONAL NEGATIVE: 1
NEUROLOGICAL NEGATIVE: 1
BRUISES/BLEEDS EASILY: 1
RESPIRATORY NEGATIVE: 1
MUSCULOSKELETAL NEGATIVE: 1
CARDIOVASCULAR NEGATIVE: 1

## 2018-08-15 NOTE — PROGRESS NOTES
Report received at bedside, assumed care. Pt is resting in bed. A&O x 4. No complaints of pain. Heparin drip running at 550 units/hour, verified in MAR. No other concerns, complains or distress. Tele box on. Chart reviewed. Bed in lowest position, treaded slipper sock on, and call light within reach.

## 2018-08-15 NOTE — TELEPHONE ENCOUNTER
Was the patient seen in the last year in this department? Yes    Does patient have an active prescription for medications requested? No     Received Request Via: patient

## 2018-08-15 NOTE — TELEPHONE ENCOUNTER
Pt is currently in the hospital and has found out you will be leaving [facility]. She would like to know if you can send in a refill  Of her paxil with additional refills until she can find a new provider. I will send you in a refill request.     Thank you

## 2018-08-15 NOTE — DISCHARGE PLANNING
Anticipated Discharge Disposition: Home     Action: LSW met with pt and  at bedside to discuss discharge plan. Current plan is home no needs.     Barriers to Discharge: Medical clearance    Plan: D/C home when medically clearedCare Transition Team Assessment    Information Source  Orientation : Oriented x 4  Information Given By: Patient  Who is responsible for making decisions for patient? : Patient         Elopement Risk  Legal Hold: No  Ambulatory or Self Mobile in Wheelchair: Yes  Disoriented: No  Psychiatric Symptoms: None  History of Wandering: No  Elopement this Admit: No  Vocalizing Wanting to Leave: No  Displays Behaviors, Body Language Wanting to Leave: No-Not at Risk for Elopement  Elopement Risk: Not at Risk for Elopement    Interdisciplinary Discharge Planning  Patient or legal guardian wants to designate a caregiver (see row info): No    Discharge Preparedness  What is your plan after discharge?: Home with help  What are your discharge supports?: Spouse  Prior Functional Level: Independent with Activities of Daily Living    Functional Assesment  Prior Functional Level: Independent with Activities of Daily Living    Finances  Financial Barriers to Discharge: No  Prescription Coverage: Yes    Vision / Hearing Impairment  Vision Impairment : Yes  Right Eye Vision: Wears Glasses, Impaired  Left Eye Vision: Wears Glasses, Impaired  Hearing Impairment : No    Values / Beliefs / Concerns  Values / Beliefs Concerns : No    Advance Directive  Advance Directive?: None  Advance Directive offered?: AD Booklet refused    Domestic Abuse  Have you ever been the victim of abuse or violence?: No  Physical Abuse or Sexual Abuse: No  Verbal Abuse or Emotional Abuse: No    Psychological Assessment  History of Substance Abuse: None  History of Psychiatric Problems: Yes  Newly Diagnosed Illness: No    Discharge Risks or Barriers  Discharge risks or barriers?: No    Anticipated Discharge Information  Anticipated  discharge disposition: Home

## 2018-08-15 NOTE — NON-PROVIDER
Internal Medicine Medical Student Note  Note Author: Leighann Jc, Student    Name Nadia Lazaro     1965   Age/Sex 53 y.o. female   MRN [id number]   Code Status full             Reason for interval visit  (Principal Problem)   Hematoma of right hip    Interval Problem Daily Status Update  (problem status, last 24 hours, new history, new data )   Today Ms. Lazaro is extremely upset that her INR is not where it needs to be. She was planning on going home today, so she was quite disappointed when she found out that her INR was still 2.38. Regardless, she agrees that is is unsafe for her to discharge today. We will increase her warfarin dose to 12.5 mg tonight and recheck tomorrow morning. No other changes overnight. She is ready for discharge as soon as INR is above 2.8.      Physical Exam       Vitals:    08/15/18 0000 08/15/18 0400 08/15/18 0800 08/15/18 1200   BP: (!) 90/54 108/69 120/82 105/62   Pulse: 72 67 89 77   Resp: 16 17 20 17   Temp: 36.3 °C (97.4 °F) 36.4 °C (97.5 °F) 36.8 °C (98.2 °F) 36.5 °C (97.7 °F)   SpO2: 97% 98% 98% 97%   Weight:       Height:         Body mass index is 32.85 kg/m². Weight: 100.9 kg (222 lb 7.1 oz)  Oxygen Therapy:  Pulse Oximetry: 97 %, O2 (LPM): 0, O2 Delivery: None (Room Air)    Physical Exam   Constitutional: She is oriented to person, place, and time.   Pleasant patient in mild emotional distress due to not being able to be discharged today   HENT:   Head: Normocephalic and atraumatic.   Eyes: EOM are normal.   Cardiovascular: Normal rate, regular rhythm and normal heart sounds.  Exam reveals no gallop and no friction rub.    No murmur heard.  Pulmonary/Chest: Effort normal and breath sounds normal. She has no wheezes. She has no rales.   Abdominal: Soft. Bowel sounds are normal. She exhibits no distension. There is no tenderness.   Musculoskeletal: Normal range of motion. She exhibits no edema or tenderness.   Bruise of right lower extremity  continues to progress as expected. No evidence of new bleed or clot.    Neurological: She is alert and oriented to person, place, and time.   Skin: Skin is warm and dry. Rash (venous stasis dermatitis of lower left leg) noted.     Results for SHABBIR CARMEN (MRN [id number]) as of 8/15/2018 15:16   Ref. Range 8/13/2018 23:52 8/14/2018 06:08 8/15/2018 06:11 8/15/2018 11:53   PT Latest Ref Range: 12.0 - 14.6 sec 23.2 (H)  25.7 (H) 28.6 (H)   INR Latest Ref Range: 0.87 - 1.13  2.09 (H)  2.38 (H) 2.73 (H)   APTT Latest Ref Range: 24.7 - 36.0 sec 88.2 (H) 68.8 (H) 73.4 (H)          Assessment/Plan     1) Hematoma of posterior medial hip/gluteal muscle  - Seen on CT on 8/6, INR >10, PT 97.9 on presentation to ED. Repeat CT showed no evidence of new/worsening bleeding  - Hgb dropped from 11 to 7.4 but baseline around 14, so patient received 1 unit pRBC's on 8/9. Hgb up to 9.5 today  - Iron panel was abnormal: Iron 15, TIBC 279, % saturation 5. Started IV iron- last day today  - TEG showed reaction time of 12.9, gave 2 units FFP and 5 mg vit K PO. Got additional 2.5 mg vit K PO, INR now 2.38  - INR too low to safely discharge patient with her history of protein S deficiency so we will wait until INR between 2.8 and 3.9 before she can go home. Monitor signs of new bleed or new clot.  - Will give patient 12.5 mg dose of warfarin today in hopes that INR will be therapeutic for d/c tomorrow     2) Supratherapeutic INR and chronic anticoagulation  - INR 8/6 and 8/7 in the morning was >10   - Current INR is 2.38  - Patient takes warfarin 10 mg on Tuesdays and Saturdays, and 7.5 mg every other day of the week  - Protein S deficiency: patient's doctor set her goal INR for 3-4 (she has had DVT's at INR of 2.7)  - Patient was started on heparin drip and warfarin, will continue to monitor INR daily until it is stable between 2.8 and 3.9     3) Leukocytosis- Resolved  - Most recent WBC count is down to 8.9     4) Anxiety  - Patient  on Paxil for generalized anxiety at home  - This hospitalization, has been on Paxil at 20 mg once per day  - She has been anxious about the length of this hospitalization and was quite upset this morning. She still agrees it is not safe for her to go home at this time and agrees to wait until INR is therapeutic so she can go home.     5) Multiple Sclerosis  - No relapses during this hospitalization    - Patient receives Tysabri IV every 8 weeks  - Has baclofen pump for back muscle spasms

## 2018-08-16 ENCOUNTER — PATIENT OUTREACH (OUTPATIENT)
Dept: HEALTH INFORMATION MANAGEMENT | Facility: OTHER | Age: 53
End: 2018-08-16

## 2018-08-16 VITALS
HEIGHT: 69 IN | RESPIRATION RATE: 18 BRPM | HEART RATE: 74 BPM | TEMPERATURE: 97.4 F | OXYGEN SATURATION: 92 % | DIASTOLIC BLOOD PRESSURE: 60 MMHG | SYSTOLIC BLOOD PRESSURE: 122 MMHG | BODY MASS INDEX: 33.04 KG/M2 | WEIGHT: 223.11 LBS

## 2018-08-16 DIAGNOSIS — D68.59 PROTEIN S DEFICIENCY (HCC): ICD-10-CM

## 2018-08-16 LAB
APTT PPP: 72.9 SEC (ref 24.7–36)
ERYTHROCYTE [DISTWIDTH] IN BLOOD BY AUTOMATED COUNT: 56.1 FL (ref 35.9–50)
HCT VFR BLD AUTO: 28.4 % (ref 37–47)
HGB BLD-MCNC: 9.2 G/DL (ref 12–16)
INR PPP: 3.23 (ref 0.87–1.13)
MCH RBC QN AUTO: 30.6 PG (ref 27–33)
MCHC RBC AUTO-ENTMCNC: 32.4 G/DL (ref 33.6–35)
MCV RBC AUTO: 94.4 FL (ref 81.4–97.8)
PLATELET # BLD AUTO: 396 K/UL (ref 164–446)
PMV BLD AUTO: 10.3 FL (ref 9–12.9)
PROTHROMBIN TIME: 32.7 SEC (ref 12–14.6)
RBC # BLD AUTO: 3.01 M/UL (ref 4.2–5.4)
WBC # BLD AUTO: 12.4 K/UL (ref 4.8–10.8)

## 2018-08-16 PROCEDURE — 85027 COMPLETE CBC AUTOMATED: CPT

## 2018-08-16 PROCEDURE — 85610 PROTHROMBIN TIME: CPT

## 2018-08-16 PROCEDURE — 99239 HOSP IP/OBS DSCHRG MGMT >30: CPT | Mod: GC | Performed by: HOSPITALIST

## 2018-08-16 PROCEDURE — 85730 THROMBOPLASTIN TIME PARTIAL: CPT

## 2018-08-16 PROCEDURE — A9270 NON-COVERED ITEM OR SERVICE: HCPCS | Performed by: STUDENT IN AN ORGANIZED HEALTH CARE EDUCATION/TRAINING PROGRAM

## 2018-08-16 PROCEDURE — 700102 HCHG RX REV CODE 250 W/ 637 OVERRIDE(OP): Performed by: STUDENT IN AN ORGANIZED HEALTH CARE EDUCATION/TRAINING PROGRAM

## 2018-08-16 PROCEDURE — 36415 COLL VENOUS BLD VENIPUNCTURE: CPT

## 2018-08-16 RX ORDER — WARFARIN SODIUM 7.5 MG/1
7.5 TABLET ORAL DAILY
Qty: 30 TAB | Refills: 1 | Status: SHIPPED | OUTPATIENT
Start: 2018-08-16 | End: 2018-11-12

## 2018-08-16 RX ADMIN — VITAMIN D, TAB 1000IU (100/BT) 1000 UNITS: 25 TAB at 04:14

## 2018-08-16 RX ADMIN — PAROXETINE HYDROCHLORIDE 20 MG: 20 TABLET, FILM COATED ORAL at 04:14

## 2018-08-16 ASSESSMENT — PAIN SCALES - GENERAL
PAINLEVEL_OUTOF10: 0

## 2018-08-16 NOTE — CARE PLAN
Problem: Safety  Goal: Will remain free from injury  Outcome: PROGRESSING AS EXPECTED  Assist patient as necessary    Problem: Pain Management  Goal: Pain level will decrease to patient's comfort goal  Patient will remain pain free.

## 2018-08-16 NOTE — PROGRESS NOTES
Internal Medicine Interval Note  Note Author: Trinity Singh M.D.     Name Nadia Lazaro 1965   Age/Sex 53 y.o. female   MRN [id number]   Code Status DNR/DNI     After 5PM or if no immediate response to page, please call for cross-coverage  Attending/Team: David/Vladimir See Patient List for primary contact information  Call [phone] to page    1st Call - Day Intern (R1):   Samantha 2nd Call - Day Sr. Resident (R2/R3):   Mychal         Reason for interval visit  (Principal Problem)   R hip hematoma      Interval Problem Daily Status Update  (24 hours, problem oriented, brief subjective history, new lab/imaging data pertinent to that problem)     - INR 2.3 --> cont IV Heparin and warfarin 12.5mg to get to INR > 2.5.      Review of Systems   Constitutional: Negative.    HENT: Negative.    Eyes: Negative.    Respiratory: Negative.    Cardiovascular: Negative.    Gastrointestinal: Negative.    Genitourinary: Negative.    Musculoskeletal: Negative.    Skin:        Bruising on lateral side of R hip   Neurological: Negative.    Endo/Heme/Allergies: Bruises/bleeds easily.   Psychiatric/Behavioral: Negative.        Disposition/Barriers to discharge:   Subtherapeutic INR.    Consultants/Specialty  None      PCP: Elana Gillespie D.O.      Quality Measures  Quality-Core Measures   Reviewed items::  Labs reviewed, Medications reviewed and Radiology images reviewed  Ocampo catheter::  No Ocampo  DVT prophylaxis pharmacological::  Warfarin (Coumadin) and Heparin          Physical Exam       Vitals:    08/15/18 0400 08/15/18 0800 08/15/18 1200 08/15/18 1600   BP: 108/69 120/82 105/62 106/50   Pulse: 67 89 77 68   Resp: 17 20 17 16   Temp: 36.4 °C (97.5 °F) 36.8 °C (98.2 °F) 36.5 °C (97.7 °F) 36.3 °C (97.4 °F)   SpO2: 98% 98% 97% 92%   Weight:       Height:         Body mass index is 32.85 kg/m². Weight: 100.9 kg (222 lb 7.1 oz)  Oxygen Therapy:  Pulse Oximetry: 92 %, O2 (LPM): 0, O2 Delivery:  None (Room Air)    Physical Exam   Constitutional: She is oriented to person, place, and time and well-developed, well-nourished, and in no distress.   HENT:   Head: Normocephalic and atraumatic.   Eyes: Pupils are equal, round, and reactive to light. Conjunctivae and EOM are normal.   Neck: Normal range of motion. Neck supple.   Cardiovascular: Normal rate, regular rhythm, normal heart sounds and intact distal pulses.    Pulmonary/Chest: Effort normal and breath sounds normal.   Abdominal: Soft. Bowel sounds are normal.   Musculoskeletal: Normal range of motion.   R hip: somewhat warm to palpation along hip and lateral thigh; not firm; sensation intact, normal strength  R knee: no longer tender to palpation posteriorly  Distal pedal pulses intact b/l, no pallor     Neurological: She is alert and oriented to person, place, and time. No cranial nerve deficit.   Skin: Skin is warm. No pallor.   Bruises along R lateral hip and thigh, slightly less pronounced today.     Lab Results   Component Value Date/Time    WBC 11.7 (H) 08/15/2018 05:50 PM    RBC 3.19 (L) 08/15/2018 05:50 PM    HEMOGLOBIN 9.4 (L) 08/15/2018 05:50 PM    HEMATOCRIT 30.3 (L) 08/15/2018 05:50 PM    MCV 95.0 08/15/2018 05:50 PM    MCH 29.5 08/15/2018 05:50 PM    MCHC 31.0 (L) 08/15/2018 05:50 PM    MPV 10.3 08/15/2018 05:50 PM    NEUTSPOLYS 58.70 08/09/2018 06:01 AM    LYMPHOCYTES 26.10 08/09/2018 06:01 AM    MONOCYTES 10.80 08/09/2018 06:01 AM    EOSINOPHILS 3.10 08/09/2018 06:01 AM    BASOPHILS 0.60 08/09/2018 06:01 AM      Lab Results   Component Value Date/Time    PROTHROMBTM 28.6 (H) 08/15/2018 11:53 AM    INR 2.73 (H) 08/15/2018 11:53 AM       Assessment/Plan     * Hematoma of right hip- (present on admission)   Assessment & Plan    - Hemodynamically stable, no signs of compartment syndrome.  Pain minimal in her leg at this point. Bruising along lateral leg does not indicate any new bleeding.  - INR 2.3 --> Continue Heparin IV.  - Warfarin 12.5mg  qhs.  - Continue to monitor for signs of compartment syndrome: paresthesias, pain out of proportion, weakness, decreased pulses.          Anemia due to acute blood loss- (present on admission)   Assessment & Plan    Resolving. Had most likely been dilutional given IVF.  - Hemodynamically stable.        Warfarin toxicity, accidental or unintentional, initial encounter- (present on admission)   Assessment & Plan    Took a 10mg tablet instead of a 7.5mg tablet recently. This may have contributed to her acute bleeding.  - Cont INR checks, her baseline is 3-4.  - INR 2.3 --> still subtherapeutic compared to her baseline. Cont Heparin IV.  - Warfarin 12.5mg qhs.        Warfarin-induced coagulopathy (HCC)- (present on admission)   Assessment & Plan    -chronically on  Warfarin with INR 3-4 at home.  Has had 11 DVT's in past, including one at INR 2.7 before.  - Had IVC filter in place from [id number]. Had it removed last year as it had perforated too high.  - Continue to monitor coagulation          Coagulopathy (HCC)   Assessment & Plan    Hx of 11 DVTs.   - Cont INR checks, her baseline is 3-4  - INR 2.09 --> Cont Heparin IV  - Warfarin 10mg given.  - H&H bid  - follow coag panel for reversal of coagulopathy        Anxiety   Assessment & Plan    Cont home Paxil 20mg daily.        Hypocalcemia   Assessment & Plan    - Cont home calcium carbonate 500mg BID.        Protein S deficiency (HCC)- (present on admission)   Assessment & Plan    Diagnosed at age 16, on chronic anticoagulation        MS (multiple sclerosis) (HCC)- (present on admission)   Assessment & Plan    - Stable   -On Tysabri infusion [id number]  -baclofen intrathecal pump for back pain          Close to goal INR, possible discharge tomorrow.

## 2018-08-16 NOTE — PROGRESS NOTES
Report received at bedside.  RN assumed care.  Chart reviewed.  Patient resting in bed. Updated plan of care with patient. Patient verified on telemetry.  Bed alarm on.  Call light in reach.  Bed in lowest position.  Non-slip socks on.  Patient alert and oriented x4.  Pain addressed.

## 2018-08-16 NOTE — PROGRESS NOTES
Discharge instructions given and discussed, signed copy in chart. Pt A&Ox x4. Pt verbalized understanding of discharge teaching using teach back method. All questions answered. One prescription given to patient. Pt discharged in stable condition via wheelchair  escorted by CNA. Personal belongings with patient patient. IV removed. Tele box removed. Monitor room notified of pt's discharge.

## 2018-08-16 NOTE — NON-PROVIDER
Internal Medicine Medical Student Note  Note Author: Leighann Jc, Student    Name Nadia Lazaro     1965   Age/Sex 53 y.o. female   MRN [id number]   Code Status full             Reason for interval visit  (Principal Problem)   Hematoma of right hip    Interval Problem Daily Status Update  (problem status, last 24 hours, new history, new data )   Ms. Lazaro is feeling much better emotionally today, she is ready and excited to be going home. Her INR this morning is 3.23 which is in the acceptable range for discharge. No other acute changes overnight. She is not experiencing any pain.       Physical Exam       Vitals:    08/15/18 2000 18 0000 18 0400 18 0800   BP: 121/94 102/56 101/65 (!) 99/56   Pulse: 84 85 76 82   Resp: 18  18   Temp: 36.8 °C (98.3 °F) 37.4 °C (99.4 °F) 36.5 °C (97.7 °F) 36.2 °C (97.2 °F)   SpO2: 98% 98% 94% 94%   Weight: 101.2 kg (223 lb 1.7 oz)      Height:         Body mass index is 32.95 kg/m². Weight: 101.2 kg (223 lb 1.7 oz)  Oxygen Therapy:  Pulse Oximetry: 94 %, O2 (LPM): 0, O2 Delivery: None (Room Air)    Physical Exam   Constitutional: She is oriented to person, place, and time.   Pleasant patient in no acute distress, she is obese   HENT:   Head: Normocephalic and atraumatic.   Eyes: EOM are normal.   Neck: Normal range of motion.   Cardiovascular: Normal rate and regular rhythm.  Exam reveals no gallop and no friction rub.    No murmur heard.  Pulmonary/Chest: Effort normal and breath sounds normal. She has no wheezes. She has no rales.   Abdominal: Soft. Bowel sounds are normal. She exhibits no distension. There is no tenderness.   Musculoskeletal: Normal range of motion. She exhibits no edema.   Neurological: She is alert and oriented to person, place, and time.   Skin:   Ecchymosis of right leg progressive as expected       Results for NADIA LAZARO (MRN [id number]) as of 2018 12:53   Ref. Range 8/15/2018 11:53 8/15/2018  17:50 8/16/2018 03:28   WBC Latest Ref Range: 4.8 - 10.8 K/uL  11.7 (H) 12.4 (H)   RBC Latest Ref Range: 4.20 - 5.40 M/uL  3.19 (L) 3.01 (L)   Hemoglobin Latest Ref Range: 12.0 - 16.0 g/dL  9.4 (L) 9.2 (L)   Hematocrit Latest Ref Range: 37.0 - 47.0 %  30.3 (L) 28.4 (L)   MCV Latest Ref Range: 81.4 - 97.8 fL  95.0 94.4   MCH Latest Ref Range: 27.0 - 33.0 pg  29.5 30.6   MCHC Latest Ref Range: 33.6 - 35.0 g/dL  31.0 (L) 32.4 (L)   RDW Latest Ref Range: 35.9 - 50.0 fL  57.0 (H) 56.1 (H)   Platelet Count Latest Ref Range: 164 - 446 K/uL  411 396   MPV Latest Ref Range: 9.0 - 12.9 fL  10.3 10.3   PT Latest Ref Range: 12.0 - 14.6 sec 28.6 (H)  32.7 (H)   INR Latest Ref Range: 0.87 - 1.13  2.73 (H)  3.23 (H)   APTT Latest Ref Range: 24.7 - 36.0 sec   72.9 (H)         Assessment/Plan     1) Hematoma of posterior medial hip/gluteal muscle  - Seen on CT on 8/6, INR >10, PT 97.9 on presentation to ED. Repeat CT showed no evidence of new/worsening bleeding  - Hgb dropped from 11 to 7.4 but baseline around 14, so patient received 1 unit pRBC's on 8/9. Hgb up to 9.2 today  - Iron panel was abnormal: Iron 15, TIBC 279, % saturation 5. Gave IV iron dose.  - TEG showed reaction time of 12.9, gave 2 units FFP and 5 mg vit K PO. Got additional 2.5 mg vit K PO, INR now 3.23-- within acceptable range for discharge. No signs of bleed or clot  - Will discharge patient on 7.5 mg dose of warfarin and will set her up to follow [facility] on Monday to check her INR  - She will also follow up with Dr. Singh who will be her PCP      2) Supratherapeutic INR and chronic anticoagulation  - INR 8/6 and 8/7 in the morning was >10   - Current INR is 3.23  - Protein S deficiency: patient's doctor set her goal INR for 3-4 (she has had DVT's at INR of 2.7)     3) Anxiety  - Patient on Paxil for generalized anxiety at home- did not start her on home med for 5 days while hospitalized-- eventually started her on 20 mg Paxil  - Her anxiety  is well controlled today     4) Multiple Sclerosis  - No relapses during this hospitalization    - Patient receives Tysabri IV every 8 weeks  - Has baclofen pump for back muscle spasms

## 2018-08-16 NOTE — PROGRESS NOTES
Received bedside report from PM nurse. Assumed patient care. Chart reviewed. Pt was resting in bed. A&O x 4. Patient denies pain. Emotional over the long stay here but understanding. POC updated with pt and on the patient communication board. Bed locked and in the lowest position. Call light within reach. Tele box on. Will continue to monitor.

## 2018-08-16 NOTE — PROGRESS NOTES
Patient is lying in bed and is awake.  Denies pain.  Call bell in reach. All needs addressed at this time.  Will continue to monitor.

## 2018-08-16 NOTE — DISCHARGE INSTRUCTIONS
Discharge Instructions    Discharged to home by car with relative. Discharged via wheelchair, hospital escort: Yes.  Special equipment needed: Not Applicable    Be sure to schedule a follow-up appointment with your primary care doctor or any specialists as instructed.     Discharge Plan:   Smoking Cessation Offered: Patient Counseled  Influenza Vaccine Indication: Not indicated: Previously immunized this influenza season and > 8 years of age    I understand that a diet low in cholesterol, fat, and sodium is recommended for good health. Unless I have been given specific instructions below for another diet, I accept this instruction as my diet prescription.   Other diet: general    Special Instructions: None    · Is patient discharged on Warfarin / Coumadin?   Yes    You are receiving the drug warfarin. Please understand the importance of monitoring warfarin with scheduled PT/INR blood draws.  Follow-up with a call to your personal Doctor's office in 3 days to schedule a PT/INR. .    IMPORTANT: HOW TO USE THIS INFORMATION:  This is a summary and does NOT have all possible information about this product. This information does not assure that this product is safe, effective, or appropriate for you. This information is not individual medical advice and does not substitute for the advice of your health care professional. Always ask your health care professional for complete information about this product and your specific health needs.      WARFARIN - ORAL (WARF-uh-rin)      COMMON BRAND NAME(S): Coumadin      WARNING:  Warfarin can cause very serious (possibly fatal) bleeding. This is more likely to occur when you first start taking this medication or if you take too much warfarin. To decrease your risk for bleeding, your doctor or other health care provider will monitor you closely and check your lab results (INR test) to make sure you are not taking too much warfarin. Keep all medical and laboratory appointments. Tell  "your doctor right away if you notice any signs of serious bleeding. See also Side Effects section.      USES:  This medication is used to treat blood clots (such as in deep vein thrombosis-DVT or pulmonary embolus-PE) and/or to prevent new clots from forming in your body. Preventing harmful blood clots helps to reduce the risk of a stroke or heart attack. Conditions that increase your risk of developing blood clots include a certain type of irregular heart rhythm (atrial fibrillation), heart valve replacement, recent heart attack, and certain surgeries (such as hip/knee replacement). Warfarin is commonly called a \"blood thinner,\" but the more correct term is \"anticoagulant.\" It helps to keep blood flowing smoothly in your body by decreasing the amount of certain substances (clotting proteins) in your blood.      HOW TO USE:  Read the Medication Guide provided by your pharmacist before you start taking warfarin and each time you get a refill. If you have any questions, ask your doctor or pharmacist. Take this medication by mouth with or without food as directed by your doctor or other health care professional, usually once a day. It is very important to take it exactly as directed. Do not increase the dose, take it more frequently, or stop using it unless directed by your doctor. Dosage is based on your medical condition, laboratory tests (such as INR), and response to treatment. Your doctor or other health care provider will monitor you closely while you are taking this medication to determine the right dose for you. Use this medication regularly to get the most benefit from it. To help you remember, take it at the same time each day. It is important to eat a balanced, consistent diet while taking warfarin. Some foods can affect how warfarin works in your body and may affect your treatment and dose. Avoid sudden large increases or decreases in your intake of foods high in vitamin K (such as broccoli, cauliflower, " cabbage, brussels sprouts, kale, spinach, and other green leafy vegetables, liver, green tea, certain vitamin supplements). If you are trying to lose weight, check with your doctor before you try to go on a diet. Cranberry products may also affect how your warfarin works. Limit the amount of cranberry juice (16 ounces/480 milliliters a day) or other cranberry products you may drink or eat.      SIDE EFFECTS:  Nausea, loss of appetite, or stomach/abdominal pain may occur. If any of these effects persist or worsen, tell your doctor or pharmacist promptly. Remember that your doctor has prescribed this medication because he or she has judged that the benefit to you is greater than the risk of side effects. Many people using this medication do not have serious side effects. This medication can cause serious bleeding if it affects your blood clotting proteins too much (shown by unusually high INR lab results). Even if your doctor stops your medication, this risk of bleeding can continue for up to a week. Tell your doctor right away if you have any signs of serious bleeding, including: unusual pain/swelling/discomfort, unusual/easy bruising, prolonged bleeding from cuts or gums, persistent/frequent nosebleeds, unusually heavy/prolonged menstrual flow, pink/dark urine, coughing up blood, vomit that is bloody or looks like coffee grounds, severe headache, dizziness/fainting, unusual or persistent tiredness/weakness, bloody/black/tarry stools, chest pain, shortness of breath, difficulty swallowing. Tell your doctor right away if any of these unlikely but serious side effects occur: persistent nausea/vomiting, severe stomach/abdominal pain, yellowing eyes/skin. This drug rarely has caused very serious (possibly fatal) problems if its effects lead to small blood clots (usually at the beginning of treatment). This can lead to severe skin/tissue damage that may require surgery or amputation if left untreated. Patients with  certain blood conditions (protein C or S deficiency) may be at greater risk. Get medical help right away if any of these rare but serious side effects occur: painful/red/purplish patches on the skin (such as on the toe, breast, abdomen), change in the amount of urine, vision changes, confusion, slurred speech, weakness on one side of the body. A very serious allergic reaction to this drug is rare. However, get medical help right away if you notice any symptoms of a serious allergic reaction, including: rash, itching/swelling (especially of the face/tongue/throat), severe dizziness, trouble breathing. This is not a complete list of possible side effects. If you notice other effects not listed above, contact your doctor or pharmacist. In the [place] - Call your doctor for medical advice about side effects. You may report side effects to FDA at 8-949-ORC-4684. In [place] - Call your doctor for medical advice about side effects. You may report side effects to [facility] [place] at 1-743.586.5411.      PRECAUTIONS:  Before taking warfarin, tell your doctor or pharmacist if you are allergic to it; or if you have any other allergies. This product may contain inactive ingredients, which can cause allergic reactions or other problems. Talk to your pharmacist for more details. Before using this medication, tell your doctor or pharmacist your medical history, especially of: blood disorders (such as anemia, hemophilia), bleeding problems (such as bleeding of the stomach/intestines, bleeding in the brain), blood vessel disorders (such as aneurysms), recent major injury/surgery, liver disease, alcohol use, mental/mood disorders (including memory problems), frequent falls/injuries. It is important that all your doctors and dentists know that you take warfarin. Before having surgery or any medical/dental procedures, tell your doctor or dentist that you are taking this medication and about all the products you use (including prescription  drugs, nonprescription drugs, and herbal products). Avoid getting injections into the muscles. If you must have an injection into a muscle (for example, a flu shot), it should be given in the arm. This way, it will be easier to check for bleeding and/or apply pressure bandages. This medication may cause stomach bleeding. Daily use of alcohol while using this medicine will increase your risk for stomach bleeding and may also affect how this medication works. Limit or avoid alcoholic beverages. If you have not been eating well, if you have an illness or infection that causes fever, vomiting, or diarrhea for more than 2 days, or if you start using any antibiotic medications, contact your doctor or pharmacist immediately because these conditions can affect how warfarin works. This medication can cause heavy bleeding. To lower the chance of getting cut, bruised, or injured, use great caution with sharp objects like safety razors and nail cutters. Use an electric razor when shaving and a soft toothbrush when brushing your teeth. Avoid activities such as contact sports. If you fall or injure yourself, especially if you hit your head, call your doctor immediately. Your doctor may need to check you. The Food & Drug Administration has stated that generic warfarin products are interchangeable. However, consult your doctor or pharmacist before switching warfarin products. Be careful not to take more than one medication that contains warfarin unless specifically directed by the doctor or health care provider who is monitoring your warfarin treatment. Older adults may be at greater risk for bleeding while using this drug. This medication is not recommended for use during pregnancy because of serious (possibly fatal) harm to an unborn baby. Discuss the use of reliable forms of birth control with your doctor. If you become pregnant or think you may be pregnant, tell your doctor immediately. If you are planning pregnancy, discuss a  "plan for managing your condition with your doctor before you become pregnant. Your doctor may switch the type of medication you use during pregnancy. Very small amounts of this medication may pass into breast milk but is unlikely to harm a nursing infant. Consult your doctor before breast-feeding.      DRUG INTERACTIONS:  Drug interactions may change how your medications work or increase your risk for serious side effects. This document does not contain all possible drug interactions. Keep a list of all the products you use (including prescription/nonprescription drugs and herbal products) and share it with your doctor and pharmacist. Do not start, stop, or change the dosage of any medicines without your doctor's approval. Warfarin interacts with many prescription, nonprescription, vitamin, and herbal products. This includes medications that are applied to the skin or inside the vagina or rectum. The interactions with warfarin usually result in an increase or decrease in the \"blood-thinning\" (anticoagulant) effect. Your doctor or other health care professional should closely monitor you to prevent serious bleeding or clotting problems. While taking warfarin, it is very important to tell your doctor or pharmacist of any changes in medications, vitamins, or herbal products that you are taking. Some products that may interact with this drug include: capecitabine, imatinib, mifepristone. Aspirin, aspirin-like drugs (salicylates), and nonsteroidal anti-inflammatory drugs (NSAIDs such as ibuprofen, naproxen, celecoxib) may have effects similar to warfarin. These drugs may increase the risk of bleeding problems if taken during treatment with warfarin. Carefully check all prescription/nonprescription product labels (including drugs applied to the skin such as pain-relieving creams) since the products may contain NSAIDs or salicylates. Talk to your doctor about using a different medication (such as acetaminophen) to treat " pain/fever. Low-dose aspirin and related drugs (such as clopidogrel, ticlopidine) should be continued if prescribed by your doctor for specific medical reasons such as heart attack or stroke prevention. Consult your doctor or pharmacist for more details. Many herbal products interact with warfarin. Tell your doctor before taking any herbal products, especially bromelains, coenzyme Q10, cranberry, danshen, dong quai, fenugreek, garlic, ginkgo biloba, ginseng, and Li's wort, among others. This medication may interfere with a certain laboratory test to measure theophylline levels, possibly causing false test results. Make sure laboratory personnel and all your doctors know you use this drug.      OVERDOSE:  If overdose is suspected, contact a poison control center or emergency room immediately. [place] residents can call the [place] [employer] Poison Hotline at 1-415.433.9840. [place] residents can call a provincial poison control center. Symptoms of overdose may include: bloody/black/tarry stools, pink/dark urine, unusual/prolonged bleeding.      NOTES:  Do not share this medication with others. Laboratory and/or medical tests (such as INR, complete blood count) must be performed periodically to monitor your progress or check for side effects. Consult your doctor for more details.      MISSED DOSE:  For the best possible benefit, do not miss any doses. If you do miss a dose and remember on the same day, take it as soon as you remember. If you remember on the next day, skip the missed dose and resume your usual dosing schedule. Do not double the dose to catch up because this could increase your risk for bleeding. Keep a record of missed doses to give to your doctor or pharmacist. Contact your doctor or pharmacist if you miss 2 or more doses in a row.      STORAGE:  Store at room temperature away from light and moisture. Do not store in the bathroom. Keep all medications away from children and pets. Do not flush medications  down the toilet or pour them into a drain unless instructed to do so. Properly discard this product when it is  or no longer needed. Consult your pharmacist or local waste disposal company for more details about how to safely discard your product.      MEDICAL ALERT:  Your condition and medication can cause complications in a medical emergency. For information about enrolling in MedicAlert, call 1-840.506.5098 ([place]) or 1-561.343.6199 ([place]).      Information last revised 2010 Copyright(c)  [employer]             Depression / Suicide Risk    As you are discharged from this [facility] facility, it is important to learn how to keep safe from harming yourself.    Recognize the warning signs:  · Abrupt changes in personality, positive or negative- including increase in energy   · Giving away possessions  · Change in eating patterns- significant weight changes-  positive or negative  · Change in sleeping patterns- unable to sleep or sleeping all the time   · Unwillingness or inability to communicate  · Depression  · Unusual sadness, discouragement and loneliness  · Talk of wanting to die  · Neglect of personal appearance   · Rebelliousness- reckless behavior  · Withdrawal from people/activities they love  · Confusion- inability to concentrate     If you or a loved one observes any of these behaviors or has concerns about self-harm, here's what you can do:  · Talk about it- your feelings and reasons for harming yourself  · Remove any means that you might use to hurt yourself (examples: pills, rope, extension cords, firearm)  · Get professional help from the community (Mental Health, Substance Abuse, psychological counseling)  · Do not be alone:Call your Safe Contact- someone whom you trust who will be there for you.  · Call your local CRISIS HOTLINE 812-0469 or 132-046-6168  · Call your local Children's Mobile Crisis Response Team [place] (959) 708-7876 or www.[employer]  · Call  the toll free National Suicide Prevention Hotlines   · National Suicide Prevention Lifeline 831-781-WXMJ (5908)  · National Hope Line Network 800-SUICIDE (694-2762)

## 2018-08-17 ENCOUNTER — PATIENT OUTREACH (OUTPATIENT)
Dept: HEALTH INFORMATION MANAGEMENT | Facility: OTHER | Age: 53
End: 2018-08-17

## 2018-08-20 ENCOUNTER — ANTICOAGULATION VISIT (OUTPATIENT)
Dept: VASCULAR LAB | Facility: MEDICAL CENTER | Age: 53
End: 2018-08-20
Attending: INTERNAL MEDICINE
Payer: MEDICARE

## 2018-08-20 ENCOUNTER — PATIENT OUTREACH (OUTPATIENT)
Dept: HEALTH INFORMATION MANAGEMENT | Facility: OTHER | Age: 53
End: 2018-08-20

## 2018-08-20 DIAGNOSIS — D68.32 WARFARIN-INDUCED COAGULOPATHY (HCC): ICD-10-CM

## 2018-08-20 DIAGNOSIS — E83.51 HYPOCALCEMIA: ICD-10-CM

## 2018-08-20 DIAGNOSIS — T45.515A WARFARIN-INDUCED COAGULOPATHY (HCC): ICD-10-CM

## 2018-08-20 DIAGNOSIS — D68.59 PROTEIN S DEFICIENCY (HCC): ICD-10-CM

## 2018-08-20 LAB — INR PPP: >8 (ref 2–3.5)

## 2018-08-20 PROCEDURE — 99212 OFFICE O/P EST SF 10 MIN: CPT | Performed by: NURSE PRACTITIONER

## 2018-08-20 PROCEDURE — 85610 PROTHROMBIN TIME: CPT

## 2018-08-20 NOTE — Clinical Note
This pt's INR range was increased to 3-4. She was hospitalized last week with leg hematoma. I couldn't figure out why range increased from 3-3.5. Just wanted to let you know.  thanks

## 2018-08-20 NOTE — PROGRESS NOTES
Anticoagulation Summary  As of 8/20/2018    INR goal:   3.0-4.0   TTR:   --   Prior goal:   3.0-3.5   Today's INR:   >8.0!   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 Referring Family Medicine [phone]    [facility] Responsible  [phone]        Anticoagulation Patient Findings      HPI:  Nadia Lazaro seen in clinic today for follow up on anticoagulation therapy in the presence of DVT, protein S def. Pt hospitalized last week with right gluteus hematoma after moving heavy boxes. INR > 10 after taking a double dose by mistake. Given vit k and FP. Hx of multiple DVTs. She is taking 7.5 mg daily. INR range increased to 3-3.9. Denies any medication or diet changes. No current symptoms of bleeding or thrombosis reported. Still having right upper leg tenderness.  A/P:   INR supratherapeutic. INR > 8.0, confirmed with 2nd POC. Declined stat lab draw. Will HOLD two doses and follow up in 2 days. She knows to go to the ER for any s/sx of clots or for any prolonged bleeding lasting > 20 min without stop. She has a home monitor but prefers to come into our clinic until she is stable.    Follow up appointment in 2 days.    Next Appointment: Wednesday, August 22, 2018 at 3:30 pm.     Drea LOMELI

## 2018-08-21 ENCOUNTER — OFFICE VISIT (OUTPATIENT)
Dept: MEDICAL GROUP | Facility: PHYSICIAN GROUP | Age: 53
End: 2018-08-21
Payer: MEDICARE

## 2018-08-21 VITALS
HEART RATE: 97 BPM | SYSTOLIC BLOOD PRESSURE: 108 MMHG | HEIGHT: 68 IN | WEIGHT: 226 LBS | BODY MASS INDEX: 34.25 KG/M2 | RESPIRATION RATE: 12 BRPM | OXYGEN SATURATION: 96 % | TEMPERATURE: 97 F | DIASTOLIC BLOOD PRESSURE: 76 MMHG

## 2018-08-21 DIAGNOSIS — R19.5 OCCULT BLOOD POSITIVE STOOL: ICD-10-CM

## 2018-08-21 DIAGNOSIS — D62 ANEMIA DUE TO ACUTE BLOOD LOSS: ICD-10-CM

## 2018-08-21 DIAGNOSIS — S70.01XS HEMATOMA OF RIGHT HIP, SEQUELA: ICD-10-CM

## 2018-08-21 DIAGNOSIS — N92.0 MENORRHAGIA WITH REGULAR CYCLE: ICD-10-CM

## 2018-08-21 DIAGNOSIS — Z09 HOSPITAL DISCHARGE FOLLOW-UP: ICD-10-CM

## 2018-08-21 DIAGNOSIS — T45.511A WARFARIN TOXICITY, ACCIDENTAL OR UNINTENTIONAL, INITIAL ENCOUNTER: ICD-10-CM

## 2018-08-21 LAB — INR BLD: >8 (ref 0.9–1.2)

## 2018-08-21 PROCEDURE — 99214 OFFICE O/P EST MOD 30 MIN: CPT | Performed by: NURSE PRACTITIONER

## 2018-08-21 NOTE — PROGRESS NOTES
Subjective:     Chief Complaint   Patient presents with   • Hip Problem     hospital fv, right hip hematoma, INR above 10        HPI  Nadia Lazaro is a 53 y.o. female here today for hospital discharge follow up.   Admitted to [facility] 8/6-16 for RLE pain (no trauma) with past medical history of protein S deficiency (with hx of 11 DVT with one episode of PE, last DVT was 2010) on chronic anticoagulation with coumadin. On admission her INR was >10.  There have been no changes in her medications. Patient states that there is absolutely no chance that she took a double dose of warfarin. She does have chronic MS on Tysabri, and started exercising and was out in the heat with putting together new storage shed and she believes this is what initiated this. Right LE venous duplex in the ED was negative for DVT, CT abdomen/pelvis showed hematoma within the right gluteus muscle, 5 mg vit K was given and later another 5 mg also was given with FFP, the day after her INR was 3.9 and she had some more pain, 2.5 mg vit K was given and CT was repeated and it did not show any worsening for hematoma. However due to the Vit K products, her discharge was delayed due to the low INR which required significant Warfarin and heparin GGT. Due to her goal INR 3-4 (multiple thromboembolic episodes when INR <2.5), patient was sent home on 7.5 mg of warfarin per day with a close post discharge coumadin clinic follow up.     Had f/u INR yesterday 8/20 and it was >8 when seen with anticoagulation clinic. Advised to hold warfarin yesterday and today. INR will be repeated tomorrow 8/22. She knows to go to ER if bleeding lasts >20 minutes. Pain in leg rated 5-6/10.     She was found to have anemia, which is new for her this year. She has had very long menstrual cycles for the past year. Her menstrual cycle this month lasted 16 days. The first 10 days is very heavy, and after this flow is regular. Severe cramping is present for the first few  days only. Stool was positive for occult blood in the hospital, but INR was also 10 at this time. Need f/u on this in a few weeks. Colonoscopy completed 2017 was normal. Need to obtain these records.     Diagnoses of Hospital discharge follow-up, Hematoma of right hip, sequela, Warfarin toxicity, accidental or unintentional, initial encounter, Anemia due to acute blood loss, Occult blood positive stool, and Metrorrhagia were pertinent to this visit.    Allergies: Nubain [nalbuphine hcl]  Current medicines (including changes today)  Current Outpatient Prescriptions   Medication Sig Dispense Refill   • warfarin (COUMADIN) 7.5 MG Tab Take 1 Tab by mouth every day. 30 Tab 1   • paroxetine (PAXIL) 40 MG tablet Take 1 Tab by mouth every day. 90 Tab 2   • Pain Pump (PATIENT SUPPLIED) XX LEXI 1 Each by Injection route Continuous. Baclofen 20MCG daily  Bolas 10MCG/ 250MCG per ML  Pt last filled on 7/10/2018     • docosahexanoic acid (FISH OIL) 1000 MG CAPS Take 1,000 mg by mouth every day.     • Natalizumab (TYSABRI IV) 1 Dose by Intravenous route Q 4 Weeks.     • vitamin D (CHOLECALCIFEROL) 1000 UNIT TABS Take 1,000 Units by mouth 2 Times a Day.       No current facility-administered medications for this visit.        She  has a past medical history of Backpain; Bronchitis; Fall; Heart burn; Indigestion; MS (multiple sclerosis) ([facility]); Personal history of venous thrombosis and embolism (3641-9908); Protein S deficiency ([facility]); Psychiatric problem; and Unspecified urinary incontinence. She also has no past medical history of Angina; Arrhythmia; Arthritis; ASTHMA; Cancer ([facility]); CATARACT; Congestive heart failure (HCC); COPD; Diabetes; Dialysis; Glaucoma; Heart murmur; Heart valve disease; Hypertension; Infectious disease; Jaundice; Myocardial infarct (HCC); Other specified symptom associated with female genital organs; Pacemaker; Pneumonia; Renal disorder; Rheumatic fever; Seizure (HCC); Stroke ([facility]); Unspecified disorder of  "thyroid; or Unspecified hemorrhagic conditions.        ROS  As stated in HPI and additionally  Gen: No F/C, weight loss  Neuro: No lightheadedness or dizziness  CV: No chest pain or LE edema     Objective:     Blood pressure 108/76, pulse 97, temperature 36.1 °C (97 °F), resp. rate 12, height 1.727 m (5' 8\"), weight 102.5 kg (226 lb), SpO2 96 %. Body mass index is 34.36 kg/m².  Physical Exam:  General: Alert, oriented, in no acute distress.  Eye contact is good, speech goal directed, affect calm  CNs grossly intact.  HEENT: conjunctiva non-injected, sclera non-icteric, EOMs intact.  Gross hearing intact.  CV: regular rate and rhythm.   Ext: no edema, normal color and temperature.   Skin: No rashes or lesions in visible areas. Ecchymosis present anterior lower right leg, and lateral upper right leg  Gait steady.     Assessment and Plan:   Assessment/Plan:  1. Hospital discharge follow-up  Stable. I have reviewed all hospital records including lab results, imaging studies, EKG, progress notes, and admission and discharge summaries. I have discussed with patient possible factors that contributed to hospitalization and how we can prevent patient from ending up in the hospital again. Educated patient on s/sx to observe for and what action to take when these occur. Reviewed current medications and educated on importance of compliance with these medications all appropriate follow-up visits are scheduled.     2. Hematoma of right hip, sequela  Stable. No worsening pain. If sx worsen, will need repeat CT as INR is still not stable. Reviewed activity precautions with her.     3. Warfarin toxicity, accidental or unintentional, initial encounter  Not controlled continue f/u with warfarin monitoring and anticoag clinic tomorrow    4. Anemia due to acute blood loss  New problem. Repeat labs in 2 weeks after INR stabilized. D/t menorrhagia?  - CBC WITH DIFFERENTIAL; Future  - OCCULT BLOOD FECES IMMUNOASSAY; Future    5. Occult " blood positive stool  Repeat stool test in 2 weeks    6. Menorrhagia with regular cycle  New problem. Check US. Consider gyn referral. Cause of anemia?  - US-GYN-PELVIS TRANSVAGINAL; Future       Follow up:  Return need to schedule with Dr. Cash in sept .    Educated in proper administration of medication(s) ordered today including safety, possible SE, risks, benefits, rationale and alternatives to therapy.   Supportive care, differential diagnoses, and indications for immediate follow-up discussed with patient.    Pathogenesis of diagnosis discussed including typical length and natural progression.    Instructed to return to clinic or nearest emergency department for any change in condition, further concerns, or worsening of symptoms.  Patient states understanding of the plan of care and discharge instructions.      Please note that this dictation was created using voice recognition software. I have made every reasonable attempt to correct obvious errors, but I expect that there are errors of grammar and possibly content that I did not discover before finalizing the note.    Followup: Return need to schedule with Dr. Cash in sept . sooner should new symptoms or problems arise.

## 2018-08-21 NOTE — LETTER
[facility]  Elana Gillespie D.O.  [address]  [place] [address]  Fax: [phone]   Authorization for Release/Disclosure of   Protected Health Information   Name: SHABBIR CARMEN : 1965 SSN: [id number]   Address: [address] [place] [address] Phone:    [phone] (home) [phone] (work)   I authorize the entity listed below to release/disclose the PHI below to:   [facility]/Elana Gillespie D.O. and CHANDAN Rodriguez   Provider or Entity Name:  [facility]   Address   City, State, Zip   Phone:      Fax:     Reason for request: continuity of care   Information to be released:    [ xx ] LAST COLONOSCOPY,  including any PATH REPORT and follow-up  [  ] LAST FIT/COLOGUARD RESULT [  ] LAST DEXA  [  ] LAST MAMMOGRAM  [  ] LAST PAP  [  ] LAST LABS [  ] RETINA EXAM REPORT  [  ] IMMUNIZATION RECORDS  [  ] Release all info      [  ] Check here and initial the line next to each item to release ALL health information INCLUDING  _____ Care and treatment for drug and / or alcohol abuse  _____ HIV testing, infection status, or AIDS  _____ Genetic Testing    DATES OF SERVICE OR TIME PERIOD TO BE DISCLOSED: _____________  I understand and acknowledge that:  * This Authorization may be revoked at any time by you in writing, except if your health information has already been used or disclosed.  * Your health information that will be used or disclosed as a result of you signing this authorization could be re-disclosed by the recipient. If this occurs, your re-disclosed health information may no longer be protected by State or Federal laws.  * You may refuse to sign this Authorization. Your refusal will not affect your ability to obtain treatment.  * This Authorization becomes effective upon signing and will  on (date) __________.      If no date is indicated, this Authorization will  one (1) year from the signature date.    Name: Shabbir Tran  Iveth    Signature:   Date:     8/21/2018       PLEASE FAX REQUESTED RECORDS BACK TO: [phone]

## 2018-08-22 ENCOUNTER — ANTICOAGULATION VISIT (OUTPATIENT)
Dept: VASCULAR LAB | Facility: MEDICAL CENTER | Age: 53
End: 2018-08-22
Attending: INTERNAL MEDICINE
Payer: MEDICARE

## 2018-08-22 DIAGNOSIS — D68.32 WARFARIN-INDUCED COAGULOPATHY (HCC): ICD-10-CM

## 2018-08-22 DIAGNOSIS — D68.59 PROTEIN S DEFICIENCY (HCC): ICD-10-CM

## 2018-08-22 DIAGNOSIS — T45.515A WARFARIN-INDUCED COAGULOPATHY (HCC): ICD-10-CM

## 2018-08-22 DIAGNOSIS — E83.51 HYPOCALCEMIA: ICD-10-CM

## 2018-08-22 LAB
INR BLD: 2.8 (ref 0.9–1.2)
INR PPP: 2.8 (ref 2–3.5)

## 2018-08-22 PROCEDURE — 99212 OFFICE O/P EST SF 10 MIN: CPT | Performed by: NURSE PRACTITIONER

## 2018-08-22 PROCEDURE — 85610 PROTHROMBIN TIME: CPT

## 2018-08-22 NOTE — Clinical Note
Delroy Elias -  This pt is going to start using her home monitor. Could you put her on someone's phone list please?  Thank you :)

## 2018-08-22 NOTE — PROGRESS NOTES
Anticoagulation Summary  As of 8/22/2018    INR goal:   3.0-4.0   TTR:   --   Today's INR:   2.8!   Warfarin maintenance plan:   7.5 mg (5 mg x 1.5) on Tue, Sat; 5 mg (5 mg x 1) all other days   Weekly warfarin total:   40 mg   Plan last modified:   ELLIE CarrilloPMICHAEL (8/22/2018)   Next INR check:   8/25/2018   Target end date:   Indefinite    Indications    Warfarin-induced coagulopathy (HCC) [D68.32  T45.515A]  Protein S deficiency (HCC) [D68.59]  Hypocalcemia [E83.51]             Anticoagulation Episode Summary     INR check location:       Preferred lab:       Send INR reminders to:       Comments:         Anticoagulation Care Providers     Provider Role Specialty Phone number    Elana Gillespie D.O. Referring Family Medicine [phone]    [facility] Anticoagulation Services Responsible  [phone]        Anticoagulation Patient Findings      HPI:  Nadia Lazaro seen in clinic today for follow up on anticoagulation therapy in the presence of Protein S def and recurrent clots. Denies any changes to current medical/health status since last appointment. Denies any medication or diet changes. No current symptoms of bleeding or thrombosis reported. No changes to right gluteal hematoma per pt.    Saw PCP yesterday. Labs ordered. No new meds. She is on light duty.    A/P:   INR subtherapeutic. INR decreased from > 8 after 2 dose holds. Pt was >8 on 7.5 mg daily. Declines Lovenox. Will have pt follow dosing calendar. She is familiar with warfarin and would like to try 7.5 mg Wed/Sat, 5 mg all other day. She has a home monitor which she will resume using. She knows to seek medical attention for any suspicious symptoms of VTE.    Follow up appointment in 2 days.    Next Appointment: Friday, August 24, 2018 - HOME monitor     Drea LOMELI

## 2018-08-24 ENCOUNTER — ANTICOAGULATION MONITORING (OUTPATIENT)
Dept: VASCULAR LAB | Facility: MEDICAL CENTER | Age: 53
End: 2018-08-24

## 2018-08-24 DIAGNOSIS — D68.59 PROTEIN S DEFICIENCY (HCC): ICD-10-CM

## 2018-08-24 DIAGNOSIS — T45.515A WARFARIN-INDUCED COAGULOPATHY (HCC): ICD-10-CM

## 2018-08-24 DIAGNOSIS — E83.51 HYPOCALCEMIA: ICD-10-CM

## 2018-08-24 DIAGNOSIS — D68.32 WARFARIN-INDUCED COAGULOPATHY (HCC): ICD-10-CM

## 2018-08-24 LAB — INR PPP: 3.7 (ref 2–3.5)

## 2018-08-24 NOTE — PROGRESS NOTES
Anticoagulation Summary  As of 8/24/2018    INR goal:   3.0-4.0   TTR:   77.8 % (2 d)   Today's INR:   3.7   Warfarin maintenance plan:   7.5 mg (5 mg x 1.5) on Tue; 5 mg (5 mg x 1) all other days   Weekly warfarin total:   37.5 mg   Plan last modified:   Isidra Currie (8/24/2018)   Next INR check:   8/27/2018   Target end date:   Indefinite    Indications    Warfarin-induced coagulopathy (HCC) [D68.32  T45.515A]  Protein S deficiency (HCC) [D68.59]  Hypocalcemia [E83.51]             Anticoagulation Episode Summary     INR check location:       Preferred lab:       Send INR reminders to:       Comments:         Anticoagulation Care Providers     Provider Role Specialty Phone number    Elana Gillespie D.O. Referring Family Medicine [phone]    [facility] Responsible  [phone]        Anticoagulation Patient Findings  Patient Findings     Negatives:   Signs/symptoms of thrombosis, Signs/symptoms of bleeding, Laboratory test error suspected, Change in health, Change in alcohol use, Change in activity, Upcoming invasive procedure, Emergency department visit, Upcoming dental procedure, Missed doses, Extra doses, Change in medications, Change in diet/appetite, Hospital admission, Bruising, Other complaints        Spoke with the patient on the phone today, reporting a SUPRA-therapeutic INR of 3.7.  Confirmed the current warfarin dosing regimen and patient compliance. The INR increased 2.8 to 3.7 in 2 days. Will have patient decrease dose to 2.5mg TONIGHT ONLY, then 5mg on Sat, and 2.5mg on Sun. Patient will follow up on Monday.     Tito Currie  PharmD

## 2018-08-27 ENCOUNTER — TELEPHONE (OUTPATIENT)
Dept: VASCULAR LAB | Facility: MEDICAL CENTER | Age: 53
End: 2018-08-27

## 2018-08-27 ENCOUNTER — ANTICOAGULATION MONITORING (OUTPATIENT)
Dept: VASCULAR LAB | Facility: MEDICAL CENTER | Age: 53
End: 2018-08-27

## 2018-08-27 DIAGNOSIS — E83.51 HYPOCALCEMIA: ICD-10-CM

## 2018-08-27 DIAGNOSIS — D68.32 WARFARIN-INDUCED COAGULOPATHY (HCC): ICD-10-CM

## 2018-08-27 DIAGNOSIS — I82.90 DEEP VEIN THROMBOSIS (DVT) OF NON-EXTREMITY VEIN, UNSPECIFIED CHRONICITY: ICD-10-CM

## 2018-08-27 DIAGNOSIS — D68.59 PROTEIN S DEFICIENCY (HCC): ICD-10-CM

## 2018-08-27 DIAGNOSIS — T45.515A WARFARIN-INDUCED COAGULOPATHY (HCC): ICD-10-CM

## 2018-08-27 LAB — INR PPP: 4.2 (ref 2–3.5)

## 2018-08-27 NOTE — PROGRESS NOTES
OP Telephone Anticoagulation Service Note    Date: 8/27/2018      Anticoagulation Summary  As of 8/27/2018    INR goal:   3.0-4.0   TTR:   67.1 % (5 d)   Today's INR:   4.2!   Warfarin maintenance plan:   2.5 mg (5 mg x 0.5) every day   Weekly warfarin total:   17.5 mg   Plan last modified:   Rosa Yeager PharmD (8/27/2018)   Next INR check:   8/30/2018   Target end date:   Indefinite    Indications    Warfarin-induced coagulopathy (HCC) [D68.32  T45.515A]  Protein S deficiency (HCC) [D68.59]  Hypocalcemia [E83.51]             Anticoagulation Episode Summary     INR check location:       Preferred lab:       Send INR reminders to:       Comments:   MD INR      Anticoagulation Care Providers     Provider Role Specialty Phone number    Elana Gillespie D.O. Referring Family Medicine [phone]    [facility] Anticoagulation Services Responsible  [phone]        Anticoagulation Patient Findings        Plan: Spoke with patient on the phone. Patient is supratherapeutic today. Confirmed dosing. No missed tablets in the last week. Patient denies any changes in medications or diet. Patient denies any signs or symptoms of bleeding or clotting. Hematoma still sore and tender but getting better per pt. Instructed patient to call clinic if any unusual bleeding or bruising occurs. Unclear why INR remains high. Will have pt hold warfarin today then resume at lower regimen. Will follow-up with patient in 3 days (s).      Rosa Yeager, Daniel

## 2018-08-27 NOTE — TELEPHONE ENCOUNTER
Left message for pt to schedule INR ASAP - was supposed to come today for INR but appt cancelled. Also let her know that a hematology referral has been placed.    Drea LOMELI

## 2018-08-28 ENCOUNTER — HOSPITAL ENCOUNTER (OUTPATIENT)
Dept: RADIOLOGY | Facility: MEDICAL CENTER | Age: 53
End: 2018-08-28
Attending: NURSE PRACTITIONER
Payer: MEDICARE

## 2018-08-28 ENCOUNTER — ANTICOAGULATION MONITORING (OUTPATIENT)
Dept: VASCULAR LAB | Facility: MEDICAL CENTER | Age: 53
End: 2018-08-28

## 2018-08-28 ENCOUNTER — TELEPHONE (OUTPATIENT)
Dept: MEDICAL GROUP | Facility: PHYSICIAN GROUP | Age: 53
End: 2018-08-28

## 2018-08-28 DIAGNOSIS — D68.32 WARFARIN-INDUCED COAGULOPATHY (HCC): ICD-10-CM

## 2018-08-28 DIAGNOSIS — N92.0 MENORRHAGIA WITH REGULAR CYCLE: ICD-10-CM

## 2018-08-28 DIAGNOSIS — E83.51 HYPOCALCEMIA: ICD-10-CM

## 2018-08-28 DIAGNOSIS — N95.0 POSTMENOPAUSAL BLEEDING: ICD-10-CM

## 2018-08-28 DIAGNOSIS — D68.59 PROTEIN S DEFICIENCY (HCC): ICD-10-CM

## 2018-08-28 DIAGNOSIS — N88.8 MASS OF UTERINE CERVIX DETERMINED BY ULTRASOUND: ICD-10-CM

## 2018-08-28 DIAGNOSIS — T45.515A WARFARIN-INDUCED COAGULOPATHY (HCC): ICD-10-CM

## 2018-08-28 LAB — INR PPP: 3.8 (ref 2–3.5)

## 2018-08-28 PROCEDURE — 76830 TRANSVAGINAL US NON-OB: CPT

## 2018-08-28 NOTE — PROGRESS NOTES
Called patient just as she was going into another dr appt. She asked us to call back in 1 hr.  8/28/18 3pm  Tito Currie PharmD

## 2018-08-29 NOTE — PROGRESS NOTES
I spoke to Nadia and she has not tested since Monday when her INR was 4.2. She thinks MD INR since the wrong test on Tuesday. She is going to check tomorrow 08/30/2018.

## 2018-08-29 NOTE — TELEPHONE ENCOUNTER
Please let patient know that her ultrasound shows masses in her uterus which are likely fibroids.  However, a malignant mass cannot be ruled out.  I've ordered a CT scan to further evaluate and also referred her to gynecology.  -Dr. Feng covering for Dr. Gillespie and Beni Stuart, APRN

## 2018-08-30 ENCOUNTER — ANTICOAGULATION MONITORING (OUTPATIENT)
Dept: VASCULAR LAB | Facility: MEDICAL CENTER | Age: 53
End: 2018-08-30

## 2018-08-30 DIAGNOSIS — D68.59 PROTEIN S DEFICIENCY (HCC): ICD-10-CM

## 2018-08-30 DIAGNOSIS — D68.32 WARFARIN-INDUCED COAGULOPATHY (HCC): ICD-10-CM

## 2018-08-30 DIAGNOSIS — T45.515A WARFARIN-INDUCED COAGULOPATHY (HCC): ICD-10-CM

## 2018-08-30 DIAGNOSIS — E83.51 HYPOCALCEMIA: ICD-10-CM

## 2018-08-30 LAB — INR PPP: 2 (ref 2–3.5)

## 2018-08-30 RX ORDER — WARFARIN SODIUM 10 MG/1
TABLET ORAL
COMMUNITY
Start: 2018-08-14 | End: 2018-11-12

## 2018-08-30 NOTE — PROGRESS NOTES
Anticoagulation Summary  As of 8/30/2018    INR goal:   3.0-4.0   TTR:   59.0 % (1.1 wk)   Today's INR:   2.0!   Warfarin maintenance plan:   5 mg (5 mg x 1) on Thu; 2.5 mg (5 mg x 0.5) all other days   Weekly warfarin total:   20 mg   Plan last modified:   Carol Dupree PharmD (8/30/2018)   Next INR check:   9/4/2018   Target end date:   Indefinite    Indications    Warfarin-induced coagulopathy (HCC) [D68.32  T45.515A]  Protein S deficiency (HCC) [D68.59]  Hypocalcemia [E83.51]             Anticoagulation Episode Summary     INR check location:       Preferred lab:       Send INR reminders to:       Comments:   MD INR      Anticoagulation Care Providers     Provider Role Specialty Phone number    Elana Gillespie D.O. Referring Family Medicine [phone]    [facility] Anticoagulation Services Responsible  [phone]        Anticoagulation Patient Findings    Spoke with Nadia to report a sub therapeutic INR of 2.0.  Pt to bolus dose with 5mg tonight, then resume 2.5mg po daily.  Follow up in 4 days, to reduce risk of adverse events related to this high risk medication,  Warfarin.    Carol Dupree, SallyD

## 2018-09-04 ENCOUNTER — ANTICOAGULATION MONITORING (OUTPATIENT)
Dept: VASCULAR LAB | Facility: MEDICAL CENTER | Age: 53
End: 2018-09-04

## 2018-09-04 DIAGNOSIS — T45.515A WARFARIN-INDUCED COAGULOPATHY (HCC): ICD-10-CM

## 2018-09-04 DIAGNOSIS — E83.51 HYPOCALCEMIA: ICD-10-CM

## 2018-09-04 DIAGNOSIS — D68.32 WARFARIN-INDUCED COAGULOPATHY (HCC): ICD-10-CM

## 2018-09-04 DIAGNOSIS — D68.59 PROTEIN S DEFICIENCY (HCC): ICD-10-CM

## 2018-09-04 LAB — INR PPP: 1.9 (ref 2–3.5)

## 2018-09-05 NOTE — PROGRESS NOTES
Anticoagulation Summary  As of 9/4/2018    INR goal:   3.0-4.0   TTR:   36.3 % (1.9 wk)   Today's INR:   1.9!   Warfarin maintenance plan:   5 mg (5 mg x 1) on Thu; 2.5 mg (5 mg x 0.5) all other days   Weekly warfarin total:   20 mg   Plan last modified:   Carol Dupree, PharmD (8/30/2018)   Next INR check:   9/7/2018   Target end date:   Indefinite    Indications    Warfarin-induced coagulopathy (HCC) [D68.32  T45.515A]  Protein S deficiency (HCC) [D68.59]  Hypocalcemia [E83.51]             Anticoagulation Episode Summary     INR check location:       Preferred lab:       Send INR reminders to:       Comments:   MD INR      Anticoagulation Care Providers     Provider Role Specialty Phone number    Elana Gillespie D.O. Referring Family Medicine [phone]    [facility] Anticoagulation Services Responsible  [phone]        Anticoagulation Patient Findings    SUB-Therapeutic INR of 1.9     Left patient voicemail. Instructed to call back if any signs or symptoms of bleeding, changes to medications or diet.     Instructed patient to take a one time bolus dose of 5mg and resume normal warfarin regimen as noted above.      Next INR check in 3 days on 9/7/18.    Brit White, Pharmacy Intern    I have reviewed and agree with the plan above on  9/4/2018    Rosa Yeager, Pharm D

## 2018-09-06 ENCOUNTER — ANTICOAGULATION MONITORING (OUTPATIENT)
Dept: VASCULAR LAB | Facility: MEDICAL CENTER | Age: 53
End: 2018-09-06

## 2018-09-06 DIAGNOSIS — D68.59 PROTEIN S DEFICIENCY (HCC): ICD-10-CM

## 2018-09-06 DIAGNOSIS — E83.51 HYPOCALCEMIA: ICD-10-CM

## 2018-09-06 DIAGNOSIS — T45.515A WARFARIN-INDUCED COAGULOPATHY (HCC): ICD-10-CM

## 2018-09-06 DIAGNOSIS — D68.32 WARFARIN-INDUCED COAGULOPATHY (HCC): ICD-10-CM

## 2018-09-06 LAB — INR PPP: 2.1 (ref 2–3.5)

## 2018-09-07 ENCOUNTER — TELEPHONE (OUTPATIENT)
Dept: MEDICAL GROUP | Facility: PHYSICIAN GROUP | Age: 53
End: 2018-09-07

## 2018-09-07 ENCOUNTER — APPOINTMENT (OUTPATIENT)
Dept: RADIOLOGY | Facility: MEDICAL CENTER | Age: 53
End: 2018-09-07
Attending: FAMILY MEDICINE
Payer: MEDICARE

## 2018-09-07 NOTE — PROGRESS NOTES
Anticoagulation Summary  As of 9/6/2018    INR goal:   3.0-4.0   TTR:   31.5 % (2.1 wk)   Today's INR:   2.1!   Warfarin maintenance plan:   5 mg (5 mg x 1) on Tue, Sat; 2.5 mg (5 mg x 0.5) all other days   Weekly warfarin total:   22.5 mg   Plan last modified:   Cristo Mathur, PharmD (9/6/2018)   Next INR check:   9/11/2018   Target end date:   Indefinite    Indications    Warfarin-induced coagulopathy (HCC) [D68.32  T45.515A]  Protein S deficiency (HCC) [D68.59]  Hypocalcemia [E83.51]             Anticoagulation Episode Summary     INR check location:       Preferred lab:       Send INR reminders to:       Comments:   MD INR      Anticoagulation Care Providers     Provider Role Specialty Phone number    Elana Gillespie D.O. Referring Family Medicine [phone]    [facility] Anticoagulation Services Responsible  [phone]        Anticoagulation Patient Findings        Spoke to patient on the phone.   INR  sub-therapeutic.   Denies signs/symptoms of bleeding and/or thrombosis.   Denies changes to diet or medications.   Follow up appointment in 1 week(s).    Increase weekly warfarin dose as noted      Cristo Mathur, PharmD

## 2018-09-07 NOTE — TELEPHONE ENCOUNTER
Spoke with the tech at [facility] and she wanted to verify weather or not she has to do the imaging today, the pt just had imaging done, spoke with Dr. Feng, she stated the pt doesn't need to do the imagine today and to follow up with her pcp    Pt informed of this information. States understanding and she is going to call and have an appointment with Dr. martines

## 2018-09-11 ENCOUNTER — APPOINTMENT (OUTPATIENT)
Dept: HEMATOLOGY ONCOLOGY | Facility: MEDICAL CENTER | Age: 53
End: 2018-09-11
Payer: MEDICARE

## 2018-09-13 ENCOUNTER — OFFICE VISIT (OUTPATIENT)
Dept: HEMATOLOGY ONCOLOGY | Facility: MEDICAL CENTER | Age: 53
End: 2018-09-13
Payer: MEDICARE

## 2018-09-13 ENCOUNTER — HOSPITAL ENCOUNTER (OUTPATIENT)
Dept: LAB | Facility: MEDICAL CENTER | Age: 53
End: 2018-09-13
Attending: INTERNAL MEDICINE
Payer: MEDICARE

## 2018-09-13 ENCOUNTER — ANTICOAGULATION MONITORING (OUTPATIENT)
Dept: VASCULAR LAB | Facility: MEDICAL CENTER | Age: 53
End: 2018-09-13

## 2018-09-13 VITALS
HEIGHT: 68 IN | HEART RATE: 75 BPM | OXYGEN SATURATION: 97 % | TEMPERATURE: 97.9 F | BODY MASS INDEX: 33.18 KG/M2 | DIASTOLIC BLOOD PRESSURE: 58 MMHG | RESPIRATION RATE: 16 BRPM | SYSTOLIC BLOOD PRESSURE: 90 MMHG | WEIGHT: 218.92 LBS

## 2018-09-13 DIAGNOSIS — D62 ANEMIA DUE TO ACUTE BLOOD LOSS: ICD-10-CM

## 2018-09-13 DIAGNOSIS — T45.515A WARFARIN-INDUCED COAGULOPATHY (HCC): ICD-10-CM

## 2018-09-13 DIAGNOSIS — E83.51 HYPOCALCEMIA: ICD-10-CM

## 2018-09-13 DIAGNOSIS — D68.9 COAGULOPATHY (HCC): Primary | ICD-10-CM

## 2018-09-13 DIAGNOSIS — D50.0 IRON DEFICIENCY ANEMIA DUE TO CHRONIC BLOOD LOSS: ICD-10-CM

## 2018-09-13 DIAGNOSIS — D68.32 WARFARIN-INDUCED COAGULOPATHY (HCC): ICD-10-CM

## 2018-09-13 DIAGNOSIS — F41.9 ANXIETY: ICD-10-CM

## 2018-09-13 DIAGNOSIS — G35 MS (MULTIPLE SCLEROSIS) (HCC): ICD-10-CM

## 2018-09-13 DIAGNOSIS — N94.89 ENDOMETRIAL MASS: ICD-10-CM

## 2018-09-13 DIAGNOSIS — D68.59 PROTEIN S DEFICIENCY (HCC): ICD-10-CM

## 2018-09-13 PROBLEM — D50.9 IRON DEFICIENCY ANEMIA: Status: ACTIVE | Noted: 2018-09-13

## 2018-09-13 LAB
ALBUMIN SERPL BCP-MCNC: 3.8 G/DL (ref 3.2–4.9)
ALBUMIN/GLOB SERPL: 1.3 G/DL
ALP SERPL-CCNC: 84 U/L (ref 30–99)
ALT SERPL-CCNC: 15 U/L (ref 2–50)
ANION GAP SERPL CALC-SCNC: 8 MMOL/L (ref 0–11.9)
AST SERPL-CCNC: 20 U/L (ref 12–45)
BASOPHILS # BLD AUTO: 0.7 % (ref 0–1.8)
BASOPHILS # BLD: 0.08 K/UL (ref 0–0.12)
BILIRUB SERPL-MCNC: 0.3 MG/DL (ref 0.1–1.5)
BUN SERPL-MCNC: 8 MG/DL (ref 8–22)
CALCIUM SERPL-MCNC: 9.1 MG/DL (ref 8.5–10.5)
CHLORIDE SERPL-SCNC: 107 MMOL/L (ref 96–112)
CO2 SERPL-SCNC: 24 MMOL/L (ref 20–33)
CREAT SERPL-MCNC: 0.72 MG/DL (ref 0.5–1.4)
EOSINOPHIL # BLD AUTO: 0.27 K/UL (ref 0–0.51)
EOSINOPHIL NFR BLD: 2.4 % (ref 0–6.9)
ERYTHROCYTE [DISTWIDTH] IN BLOOD BY AUTOMATED COUNT: 59.2 FL (ref 35.9–50)
FERRITIN SERPL-MCNC: 99.8 NG/ML (ref 10–291)
GLOBULIN SER CALC-MCNC: 2.9 G/DL (ref 1.9–3.5)
GLUCOSE SERPL-MCNC: 80 MG/DL (ref 65–99)
HCT VFR BLD AUTO: 41.1 % (ref 37–47)
HGB BLD-MCNC: 13.3 G/DL (ref 12–16)
IMM GRANULOCYTES # BLD AUTO: 0.07 K/UL (ref 0–0.11)
IMM GRANULOCYTES NFR BLD AUTO: 0.6 % (ref 0–0.9)
INR PPP: 3.1 (ref 2–3.5)
IRON SERPL-MCNC: 74 UG/DL (ref 40–170)
LYMPHOCYTES # BLD AUTO: 4.3 K/UL (ref 1–4.8)
LYMPHOCYTES NFR BLD: 38.1 % (ref 22–41)
MCH RBC QN AUTO: 31 PG (ref 27–33)
MCHC RBC AUTO-ENTMCNC: 32.4 G/DL (ref 33.6–35)
MCV RBC AUTO: 95.8 FL (ref 81.4–97.8)
MONOCYTES # BLD AUTO: 0.82 K/UL (ref 0–0.85)
MONOCYTES NFR BLD AUTO: 7.3 % (ref 0–13.4)
NEUTROPHILS # BLD AUTO: 5.74 K/UL (ref 2–7.15)
NEUTROPHILS NFR BLD: 50.9 % (ref 44–72)
NRBC # BLD AUTO: 0.04 K/UL
NRBC BLD-RTO: 0.4 /100 WBC
PLATELET # BLD AUTO: 256 K/UL (ref 164–446)
PMV BLD AUTO: 11.4 FL (ref 9–12.9)
POTASSIUM SERPL-SCNC: 4 MMOL/L (ref 3.6–5.5)
PROT SERPL-MCNC: 6.7 G/DL (ref 6–8.2)
RBC # BLD AUTO: 4.29 M/UL (ref 4.2–5.4)
SODIUM SERPL-SCNC: 139 MMOL/L (ref 135–145)
WBC # BLD AUTO: 11.3 K/UL (ref 4.8–10.8)

## 2018-09-13 PROCEDURE — 99204 OFFICE O/P NEW MOD 45 MIN: CPT | Mod: Q6 | Performed by: INTERNAL MEDICINE

## 2018-09-13 PROCEDURE — 85025 COMPLETE CBC W/AUTO DIFF WBC: CPT

## 2018-09-13 PROCEDURE — 82728 ASSAY OF FERRITIN: CPT

## 2018-09-13 PROCEDURE — 36415 COLL VENOUS BLD VENIPUNCTURE: CPT

## 2018-09-13 PROCEDURE — 80053 COMPREHEN METABOLIC PANEL: CPT

## 2018-09-13 PROCEDURE — 83540 ASSAY OF IRON: CPT

## 2018-09-13 RX ORDER — WARFARIN SODIUM 5 MG/1
5 TABLET ORAL DAILY
Status: ON HOLD | COMMUNITY
End: 2018-11-15

## 2018-09-13 RX ORDER — FERROUS SULFATE 325(65) MG
325 TABLET ORAL DAILY
Qty: 30 TAB | Refills: 0 | Status: SHIPPED | OUTPATIENT
Start: 2018-09-13 | End: 2018-11-12

## 2018-09-13 RX ORDER — FLUTICASONE PROPIONATE 50 MCG
1 SPRAY, SUSPENSION (ML) NASAL DAILY
COMMUNITY
End: 2021-05-10

## 2018-09-13 RX ORDER — ACETAMINOPHEN 325 MG/1
650 TABLET ORAL EVERY 4 HOURS PRN
Status: ON HOLD | COMMUNITY
End: 2018-11-15

## 2018-09-13 RX ORDER — WARFARIN SODIUM 2.5 MG/1
2.5 TABLET ORAL DAILY
Status: ON HOLD | COMMUNITY
End: 2018-11-15

## 2018-09-13 ASSESSMENT — ENCOUNTER SYMPTOMS
NERVOUS/ANXIOUS: 0
ABDOMINAL PAIN: 0
FEVER: 0
DEPRESSION: 0
BLURRED VISION: 0
PALPITATIONS: 0
HEADACHES: 0
COUGH: 0
CHILLS: 0
WEIGHT LOSS: 0
WHEEZING: 0
SORE THROAT: 0
SINUS PAIN: 0
CLAUDICATION: 0
VOMITING: 0
BLOOD IN STOOL: 0
MYALGIAS: 0
NAUSEA: 0
HEMOPTYSIS: 0
DIZZINESS: 0

## 2018-09-13 ASSESSMENT — PAIN SCALES - GENERAL: PAINLEVEL: NO PAIN

## 2018-09-13 NOTE — PROGRESS NOTES
Consult Note: Hematology    Date of consultation: 9/13/2018 11:36 AM    Referring provider: Drea Antunez A.P.N.    Reason for consultation: coagulopathy and suspicious endometrial mass , for endometrial cancer.       History of presenting illness: 53 year old female , with a history of protein S deficiency and multiple thromboembolic events, presents for further discussion and plans in the setting of suspicious endometrial mass compatible as per radiologic evaluation with malignancy.   Admitted to [facility] 8/6-16 for RLE pain (no trauma) with past medical history of protein S deficiency (with hx of 11 DVT with one episode of PE, last DVT was 2010) on chronic anticoagulation with coumadin. On admission her INR was >10.    Right LE venous duplex in the ED was negative for DVT, CT abdomen/pelvis showed hematoma within the right gluteus muscle,  Repeated CT abdomen 8/9/18 ---  Induration along the medial edge of right kidney and around right renal pelvis and proximal ureter is again noted but less well seen on this noncontrast exam. No new hemorrhage or acute hemorrhage is appreciated in this region.  Hemorrhage posterior to the right hip joint and deep to the gluteus musculature is again noted with no increase in size compared to prior exam.    Collateral subcutaneous venous structures are again noted in the anterior perineal region and anterior pelvic region and are unchanged compared to the prior CT.   No new abnormalities are identified.    8/28/18 : Pelvic US --- Solid mass in the endometrium measures 2.0 x 1.8 x 2.0 cm. Arterial flow is detected within the mass. Differential diagnosis includes submucosal fibroid. Endometrial polyp is in the differential diagnosis. Endometrial carcinoma is also possible.   Several solid masses are noted in the uterine wall consistent with leiomyomas. Largest uterine fundus measuring 3.0 x 2.3 x 1.6 cm.    Her INR - 9/13/18 - 3.1.    ,    Thank you very much for allowing me to see   Nadia Lazaro today .     Past Medical History:    Past Medical History:   Diagnosis Date   • Backpain    • Bronchitis     distant past   • Fall     2nd to MS   • Heart burn    • Indigestion    • MS (multiple sclerosis) (HCC)    • Personal history of venous thrombosis and embolism [id number]    leg, PE, 8 blood clots, 1 aneurysm   • Protein S deficiency (HCC)     on coumadin   • Psychiatric problem     depression   • Unspecified urinary incontinence        Past surgical history:    Past Surgical History:   Procedure Laterality Date   • PUMP INSERT/REMOVE  10/19/2010    Performed by SOMMER PAREDES at SURGERY [facility]   • PUMP INSERT/REMOVE  8/24/2010    Performed by SOMMER PAREDES at SURGERY [facility]   • OTHER NEUROLOGICAL SURG         Allergies:    Allergies   Allergen Reactions   • Nubain [Nalbuphine Hcl]      Seizures         Medications:    Current Outpatient Prescriptions   Medication Sig Dispense Refill   • warfarin (COUMADIN) 2.5 MG Tab Take 2.5 mg by mouth every day.     • warfarin (COUMADIN) 5 MG Tab Take 5 mg by mouth every day.     • acetaminophen (TYLENOL) 325 MG Tab Take 650 mg by mouth every four hours as needed.     • fluticasone (FLONASE) 50 MCG/ACT nasal spray Spray 1 Spray in nose every day.     • ferrous sulfate 325 (65 Fe) MG tablet Take 1 Tab by mouth every day. 30 Tab 0   • paroxetine (PAXIL) 40 MG tablet Take 1 Tab by mouth every day. 90 Tab 2   • Pain Pump (PATIENT SUPPLIED) XX LEXI 1 Each by Injection route Continuous. Baclofen 20MCG daily  Bolas 10MCG/ 250MCG per ML  Pt last filled on 7/10/2018     • docosahexanoic acid (FISH OIL) 1000 MG CAPS Take 1,000 mg by mouth every day.     • Natalizumab (TYSABRI IV) 1 Dose by Intravenous route Q 4 Weeks.     • vitamin D (CHOLECALCIFEROL) 1000 UNIT TABS Take 1,000 Units by mouth 2 Times a Day.     • warfarin (COUMADIN) 10 MG Tab      • warfarin (COUMADIN) 7.5 MG Tab Take 1 Tab by mouth every day. 30 Tab 1     No current  facility-administered medications for this visit.        Social History:     Social History     Social History   • Marital status:      Spouse name: N/A   • Number of children: N/A   • Years of education: N/A     Occupational History   • Not on file.     Social History Main Topics   • Smoking status: Current Some Day Smoker     Packs/day: 0.25   • Smokeless tobacco: Never Used      Comment: 4 cigs/day, trying to quit   • Alcohol use No   • Drug use: No   • Sexual activity: Not Currently     Other Topics Concern   • Not on file     Social History Narrative   • No narrative on file       Family History:     Family History   Problem Relation Age of Onset   • Hypertension Unknown    • No Known Problems Mother    • Lung Disease Father         emphysema   • No Known Problems Sister    • No Known Problems Child    • Cancer Neg Hx    • Diabetes Neg Hx        Review of Systems   Constitutional: Negative for chills, fever, malaise/fatigue and weight loss.   HENT: Negative for hearing loss, sinus pain and sore throat.    Eyes: Negative for blurred vision.   Respiratory: Negative for cough, hemoptysis and wheezing.    Cardiovascular: Negative for chest pain, palpitations, claudication and leg swelling.   Gastrointestinal: Negative for abdominal pain, blood in stool, melena, nausea and vomiting.   Genitourinary:        Vaginal bleeding heavy 16 days long bleeding monthly cycle.    Musculoskeletal: Negative for myalgias.   Skin: Negative for itching and rash.   Neurological: Negative for dizziness and headaches.   Psychiatric/Behavioral: Negative for depression. The patient is not nervous/anxious.        Physical Exam   Constitutional: She is oriented to person, place, and time and well-developed, well-nourished, and in no distress.   HENT:   Head: Normocephalic and atraumatic.   Eyes: Pupils are equal, round, and reactive to light. EOM are normal.   Neck: Normal range of motion. Neck supple. No JVD present. No tracheal  "deviation present. No thyromegaly present.   Cardiovascular: Normal rate, regular rhythm and normal heart sounds.    No murmur heard.  Pulmonary/Chest: Effort normal and breath sounds normal. No respiratory distress. She has no wheezes.   Abdominal: Soft. Bowel sounds are normal. She exhibits no distension. There is tenderness (at palpation LQ bilateral ). There is no rebound.   Musculoskeletal: She exhibits no edema.   Lymphadenopathy:     She has cervical adenopathy.   Neurological: She is alert and oriented to person, place, and time.   Skin: Skin is warm and dry. She is not diaphoretic. No erythema.   Psychiatric: Mood and affect normal.       Vitals:   BP (!) 90/58   Pulse 75   Temp 36.6 °C (97.9 °F)   Resp 16   Ht 1.727 m (5' 8\")   Wt 99.3 kg (218 lb 14.7 oz)   SpO2 97%   BMI 33.29 kg/m²     Labs:   Recent Labs     09/13/18   INR  3.1     Lab Results   Component Value Date/Time    SODIUM 138 08/13/2018 02:58 AM    POTASSIUM 3.8 08/13/2018 02:58 AM    CHLORIDE 110 08/13/2018 02:58 AM    CO2 23 08/13/2018 02:58 AM    GLUCOSE 102 (H) 08/13/2018 02:58 AM    BUN 11 08/13/2018 02:58 AM    CREATININE 0.73 08/13/2018 02:58 AM        Assessment and Plan:  1. Protein S deficiency -- multiple trombotic events , one DVT episode occurring on therapeutic INR as per patient , recently with hematoma , high INR , now corrected.     2. Endometrial mass , vaginal bleeding abnormal , need for surgical exploration .   3. In the setting of this coagulopathy anticoagulation needs to continued indefinitely. For now will mange bridging with LMWH in preparation for hysterectomy . Will decide after surgery if new generation anticoagulants will be an option for this patient.   4. Gyn -Oncology consult ASAP   5. F/U in 2 weeks for further management .     She agreed and verbalized her agreement and understanding with the current plan.  I answered all questions and concerns she has at this time.              Thank you for allowing " me to participate in her care.      All labs and/or imaging studies mentioned in the note above were reviewed with and explained to the patient as a pertain to medical decision making.          SIGNATURES:  Alize Schultz    CC:  SWATHI Downing, FEDE Zaman.

## 2018-09-13 NOTE — PATIENT INSTRUCTIONS
1. Blood work today to check iron levels   2. Start iron supplement 1 pill /day as prescribed   3. Consult GYN -Oncology ASAP   4. See me after consult with GYN -- we can plan antiocoagualation before surgery

## 2018-09-13 NOTE — PROGRESS NOTES
Anticoagulation Summary  As of 9/13/2018    INR goal:   3.0-4.0   TTR:   24.6 % (3.1 wk)   Today's INR:   3.1   Warfarin maintenance plan:   5 mg (5 mg x 1) on Tue, Sat; 2.5 mg (5 mg x 0.5) all other days   Weekly warfarin total:   22.5 mg   Plan last modified:   Cristo Mathur, PharmD (9/6/2018)   Next INR check:   9/20/2018   Target end date:   Indefinite    Indications    Warfarin-induced coagulopathy (HCC) [D68.32  T45.515A]  Protein S deficiency (HCC) [D68.59]  Hypocalcemia [E83.51]             Anticoagulation Episode Summary     INR check location:       Preferred lab:       Send INR reminders to:       Comments:   MD INR      Anticoagulation Care Providers     Provider Role Specialty Phone number    Elana Gillespie D.O. Referring Family Medicine [phone]    [facility] Responsible  [phone]        Anticoagulation Patient Findings    Spoke with pt over the phone regarding therapeutic INR of 3.1.  Pt denies any s/s of bleeding.  Pt denies recent changes to medications or diet.  Pt to continue with current dosing regimen as listed.  Next INR scheduled 9/20/18.    Rica Bañuelos, Pharmacy Intern   I have reviewed and agree with the plan above on  9/17/2018    Rosa Yeager, Pharm D

## 2018-09-17 ENCOUNTER — PATIENT OUTREACH (OUTPATIENT)
Dept: HEALTH INFORMATION MANAGEMENT | Facility: OTHER | Age: 53
End: 2018-09-17

## 2018-09-17 ENCOUNTER — PATIENT OUTREACH (OUTPATIENT)
Dept: OTHER | Facility: MEDICAL CENTER | Age: 53
End: 2018-09-17

## 2018-09-17 NOTE — PROGRESS NOTES
Call placed, no answer. Left msg asking for return call to verify that she has been able to get an appt with Dr Watts

## 2018-09-18 ENCOUNTER — PATIENT OUTREACH (OUTPATIENT)
Dept: OTHER | Facility: MEDICAL CENTER | Age: 53
End: 2018-09-18

## 2018-09-18 NOTE — PROGRESS NOTES
Spoke with patient re: apt with Dr Watts.  She said she has spoken with the office but waiting to hear when she can get in for appointment.  Then I called Dr Watts's office and they said she is not in their system and would I please resend the patient's information.  So I called the  who had sent the info the first time and she will resend to Dr Watts's office.  Will call the patient back to report.

## 2018-09-20 ENCOUNTER — ANTICOAGULATION MONITORING (OUTPATIENT)
Dept: VASCULAR LAB | Facility: MEDICAL CENTER | Age: 53
End: 2018-09-20

## 2018-09-20 DIAGNOSIS — D68.59 PROTEIN S DEFICIENCY (HCC): ICD-10-CM

## 2018-09-20 DIAGNOSIS — D68.32 WARFARIN-INDUCED COAGULOPATHY (HCC): ICD-10-CM

## 2018-09-20 DIAGNOSIS — E83.51 HYPOCALCEMIA: ICD-10-CM

## 2018-09-20 DIAGNOSIS — T45.515A WARFARIN-INDUCED COAGULOPATHY (HCC): ICD-10-CM

## 2018-09-20 LAB — INR PPP: 3.5 (ref 2–3.5)

## 2018-09-20 NOTE — PROGRESS NOTES
Anticoagulation Summary  As of 9/20/2018    INR goal:   3.0-4.0   TTR:   42.8 % (4.1 wk)   Today's INR:   3.5   Warfarin maintenance plan:   5 mg (5 mg x 1) on Tue, Sat; 2.5 mg (5 mg x 0.5) all other days   Weekly warfarin total:   22.5 mg   Plan last modified:   Cristo Mathur, PharmD (9/6/2018)   Next INR check:      Target end date:   Indefinite    Indications    Warfarin-induced coagulopathy (HCC) [D68.32  T45.515A]  Protein S deficiency (HCC) [D68.59]  Hypocalcemia [E83.51]             Anticoagulation Episode Summary     INR check location:       Preferred lab:       Send INR reminders to:       Comments:   MD INR      Anticoagulation Care Providers     Provider Role Specialty Phone number    Elana Gillespie D.O. Referring Family Medicine [phone]    [facility] Antico[facility] Responsible  [phone]        Anticoagulation Patient Findings    INR therapeutic. Spoke to patient on phone/left voicemail. Confirmed patient's current warfarin dose. Instructed patient to continue current warfarin regimen. No s/s of bleeding per patient. No new medications or change in diet per patient. Check INR again in 1 week d/t recent INR fluctuations, consider increasing interval next week. Instructed patient to call clinic at [phone] if there are any questions.     Nishant Mckeon  2019 Doctor of Pharmacy Candidate

## 2018-09-26 ENCOUNTER — HOSPITAL ENCOUNTER (OUTPATIENT)
Dept: LAB | Facility: MEDICAL CENTER | Age: 53
End: 2018-09-26
Attending: OBSTETRICS & GYNECOLOGY
Payer: MEDICARE

## 2018-09-26 PROCEDURE — 88175 CYTOPATH C/V AUTO FLUID REDO: CPT

## 2018-09-27 ENCOUNTER — TELEPHONE (OUTPATIENT)
Dept: HEMATOLOGY ONCOLOGY | Facility: MEDICAL CENTER | Age: 53
End: 2018-09-27

## 2018-09-27 LAB — INR PPP: 2.4 (ref 2–3.5)

## 2018-09-27 NOTE — TELEPHONE ENCOUNTER
Patient called to reschedule her upcoming appointment on 9/28/2018 for 10/11/2018. She will give us a call if there are any further questions or concerns.

## 2018-09-28 ENCOUNTER — APPOINTMENT (OUTPATIENT)
Dept: HEMATOLOGY ONCOLOGY | Facility: MEDICAL CENTER | Age: 53
End: 2018-09-28
Payer: MEDICARE

## 2018-09-28 ENCOUNTER — ANTICOAGULATION MONITORING (OUTPATIENT)
Dept: VASCULAR LAB | Facility: MEDICAL CENTER | Age: 53
End: 2018-09-28

## 2018-09-28 DIAGNOSIS — T45.515A WARFARIN-INDUCED COAGULOPATHY (HCC): ICD-10-CM

## 2018-09-28 DIAGNOSIS — D68.59 PROTEIN S DEFICIENCY (HCC): ICD-10-CM

## 2018-09-28 DIAGNOSIS — D68.32 WARFARIN-INDUCED COAGULOPATHY (HCC): ICD-10-CM

## 2018-09-28 DIAGNOSIS — E83.51 HYPOCALCEMIA: ICD-10-CM

## 2018-09-28 LAB — CYTOLOGY REG CYTOL: NORMAL

## 2018-09-28 NOTE — PROGRESS NOTES
Anticoagulation Summary  As of 9/28/2018    INR goal:   3.0-4.0   TTR:   43.3 % (1.2 mo)   Today's INR:   2.4! (9/27/2018)   Warfarin maintenance plan:   5 mg (5 mg x 1) on Tue, Sat; 2.5 mg (5 mg x 0.5) all other days   Weekly warfarin total:   22.5 mg   Plan last modified:   Sally AdameD (9/6/2018)   Next INR check:   10/4/2018   Target end date:   Indefinite    Indications    Warfarin-induced coagulopathy (HCC) [D68.32  T45.515A]  Protein S deficiency (HCC) [D68.59]  Hypocalcemia [E83.51]             Anticoagulation Episode Summary     INR check location:       Preferred lab:       Send INR reminders to:       Comments:   MD INR      Anticoagulation Care Providers     Provider Role Specialty Phone number    Elana Gillespie D.O. Referring Family Medicine [phone]    [facility]tico[facility] Responsible  [phone]        Anticoagulation Patient Findings      Left voicemail message to report a SUB therapeutic INR of 2.4.    Will have pt take boost dose of warfarin today of 2.4 mg and then   Pt to continue with current warfarin dosing regimen. Requested pt contact the clinic for any s/s of unusual bleeding, bruising, clotting or any changes to diet or medication.    FU INR in 1 week(s).    Bea Mabry, PharmD

## 2018-10-04 ENCOUNTER — ANTICOAGULATION MONITORING (OUTPATIENT)
Dept: VASCULAR LAB | Facility: MEDICAL CENTER | Age: 53
End: 2018-10-04

## 2018-10-04 DIAGNOSIS — E83.51 HYPOCALCEMIA: ICD-10-CM

## 2018-10-04 DIAGNOSIS — T45.515A WARFARIN-INDUCED COAGULOPATHY (HCC): ICD-10-CM

## 2018-10-04 DIAGNOSIS — D68.59 PROTEIN S DEFICIENCY (HCC): ICD-10-CM

## 2018-10-04 DIAGNOSIS — D68.32 WARFARIN-INDUCED COAGULOPATHY (HCC): ICD-10-CM

## 2018-10-04 LAB — INR PPP: 2.4 (ref 2–3.5)

## 2018-10-05 NOTE — PROGRESS NOTES
Anticoagulation Summary  As of 10/4/2018    INR goal:   3.0-4.0   TTR:   36.3 % (1.4 mo)   Today's INR:   2.4!   Warfarin maintenance plan:   5 mg (5 mg x 1) on Tue, Thu, Sat; 2.5 mg (5 mg x 0.5) all other days   Weekly warfarin total:   25 mg   Plan last modified:   Carol Dupree (10/4/2018)   Next INR check:   10/11/2018   Target end date:   Indefinite    Indications    Warfarin-induced coagulopathy (HCC) [D68.32  T45.515A]  Protein S deficiency (HCC) [D68.59]  Hypocalcemia [E83.51]             Anticoagulation Episode Summary     INR check location:       Preferred lab:       Send INR reminders to:       Comments:   MD INR      Anticoagulation Care Providers     Provider Role Specialty Phone number    Elana Gillespie D.O. Referring Family Medicine [phone]    [facility] Responsible  [phone]        Anticoagulation Patient Findings    Left voicemail message to report a  sub therapeutic INR of 2.4.  Pt to increase weekly dose by 10% Requested pt contact the clinic for any s/s of unusual bleeding, bruising, clotting or any changes to diet or medication.  Follow up in 1 weeks, to reduce the risk of adverse events related to this high risk medication, warfarin.    Carol Dupree, Clinical Pharmacist

## 2018-10-11 ENCOUNTER — OFFICE VISIT (OUTPATIENT)
Dept: HEMATOLOGY ONCOLOGY | Facility: MEDICAL CENTER | Age: 53
End: 2018-10-11
Payer: MEDICARE

## 2018-10-11 ENCOUNTER — ANTICOAGULATION MONITORING (OUTPATIENT)
Dept: VASCULAR LAB | Facility: MEDICAL CENTER | Age: 53
End: 2018-10-11

## 2018-10-11 VITALS
RESPIRATION RATE: 18 BRPM | TEMPERATURE: 98.1 F | SYSTOLIC BLOOD PRESSURE: 115 MMHG | WEIGHT: 222.11 LBS | BODY MASS INDEX: 32.9 KG/M2 | HEIGHT: 69 IN | DIASTOLIC BLOOD PRESSURE: 79 MMHG | OXYGEN SATURATION: 96 % | HEART RATE: 86 BPM

## 2018-10-11 DIAGNOSIS — D68.59 PROTEIN S DEFICIENCY (HCC): ICD-10-CM

## 2018-10-11 DIAGNOSIS — D50.0 IRON DEFICIENCY ANEMIA DUE TO CHRONIC BLOOD LOSS: ICD-10-CM

## 2018-10-11 DIAGNOSIS — E83.51 HYPOCALCEMIA: ICD-10-CM

## 2018-10-11 DIAGNOSIS — E66.9 OBESITY (BMI 30-39.9): ICD-10-CM

## 2018-10-11 DIAGNOSIS — T45.511A WARFARIN TOXICITY, ACCIDENTAL OR UNINTENTIONAL, INITIAL ENCOUNTER: ICD-10-CM

## 2018-10-11 DIAGNOSIS — N94.89 ENDOMETRIAL MASS: ICD-10-CM

## 2018-10-11 DIAGNOSIS — T45.515A WARFARIN-INDUCED COAGULOPATHY (HCC): ICD-10-CM

## 2018-10-11 DIAGNOSIS — G35 MS (MULTIPLE SCLEROSIS) (HCC): ICD-10-CM

## 2018-10-11 DIAGNOSIS — F32.5 MAJOR DEPRESSIVE DISORDER WITH SINGLE EPISODE, IN FULL REMISSION (HCC): ICD-10-CM

## 2018-10-11 DIAGNOSIS — D68.32 WARFARIN-INDUCED COAGULOPATHY (HCC): ICD-10-CM

## 2018-10-11 LAB
INR PPP: 2.8 (ref 2–3.5)
INR PPP: 2.8 (ref 2–3.5)

## 2018-10-11 PROCEDURE — 99214 OFFICE O/P EST MOD 30 MIN: CPT | Mod: Q6 | Performed by: INTERNAL MEDICINE

## 2018-10-11 ASSESSMENT — ENCOUNTER SYMPTOMS
BACK PAIN: 0
FALLS: 0
INSOMNIA: 0
MYALGIAS: 0
CONSTIPATION: 0
NECK PAIN: 0
WHEEZING: 0
COUGH: 0
SHORTNESS OF BREATH: 0
HEARTBURN: 1
BLURRED VISION: 0
NAUSEA: 0
DIARRHEA: 0
ABDOMINAL PAIN: 1
DOUBLE VISION: 0
PALPITATIONS: 0
VOMITING: 0
HEADACHES: 0
NERVOUS/ANXIOUS: 0
DIZZINESS: 0
DEPRESSION: 0

## 2018-10-11 ASSESSMENT — PAIN SCALES - GENERAL: PAINLEVEL: NO PAIN

## 2018-10-11 NOTE — PATIENT INSTRUCTIONS
1. Stop Coumadin 11/10/18   2. Start lovenox 1mg/kg 2x/  day -- on 11/11/10 in AM   3. STOP Lovenox on 11/14 /18 after AM shot   4. Surgery -- 11/15/18 with Mac -- for endometrial mass   5/ Restart lovenox 1 mg/kg 2x/day 1 day after surgery -- and if OK with Dr Watts .   6. Then if all OK resrtat coumadin 3 days after surgery , continue Lovenox for overlap -- for target INR -3.0 for 24 hours then stop Lovenox and continue Coumadin   7. See Dr Vidal in 2 months

## 2018-10-11 NOTE — PROGRESS NOTES
Date of visit: 10/11/2018  3:59 PM      Chief Complaint   Patient presents with   • Follow-Up     2 week       History of present illness:  53 year old female , with a history of protein S deficiency and multiple thromboembolic events, presents for further discussion and plans in the setting of suspicious endometrial mass compatible as per radiologic evaluation with malignancy.   Admitted to [facility] 8/6-16 for RLE pain (no trauma) with past medical history of protein S deficiency (with hx of 11 DVT with one episode of PE, last DVT was 2010) on chronic anticoagulation with coumadin. On admission her INR was >10.    Right LE venous duplex in the ED was negative for DVT, CT abdomen/pelvis showed hematoma within the right gluteus muscle,  Repeated CT abdomen 8/9/18 ---  Induration along the medial edge of right kidney and around right renal pelvis and proximal ureter is again noted but less well seen on this noncontrast exam. No new hemorrhage or acute hemorrhage is appreciated in this region.  Hemorrhage posterior to the right hip joint and deep to the gluteus musculature is again noted with no increase in size compared to prior exam.    Collateral subcutaneous venous structures are again noted in the anterior perineal region and anterior pelvic region and are unchanged compared to the prior CT.   No new abnormalities are identified.     8/28/18 : Pelvic US --- Solid mass in the endometrium measures 2.0 x 1.8 x 2.0 cm. Arterial flow is detected within the mass. Differential diagnosis includes submucosal fibroid. Endometrial polyp is in the differential diagnosis. Endometrial carcinoma is also possible.   Several solid masses are noted in the uterine wall consistent with leiomyomas. Largest uterine fundus measuring 3.0 x 2.3 x 1.6 cm.     Her INR - 9/13/18 - 3.1.    In the setting of her planned surgery secondary endometrial mass, scheduled for November 15, 2018, anticoagulation treatment will be discussed today.    Past  Medical History:      Past Medical History:   Diagnosis Date   • Backpain    • Bronchitis     distant past   • Fall     2nd to MS   • Heart burn    • Indigestion    • MS (multiple sclerosis) (HCC)    • Personal history of venous thrombosis and embolism [id number]    leg, PE, 8 blood clots, 1 aneurysm   • Protein S deficiency (HCC)     on coumadin   • Psychiatric problem     depression   • Unspecified urinary incontinence        Past surgical history:       Past Surgical History:   Procedure Laterality Date   • PUMP INSERT/REMOVE  10/19/2010    Performed by SOMMER PAREDES at SURGERY [facility]   • PUMP INSERT/REMOVE  8/24/2010    Performed by SOMMER PAREDES at SURGERY [facility]   • OTHER NEUROLOGICAL SURG         Allergies:         Allergies   Allergen Reactions   • Nubain [Nalbuphine Hcl]      Seizures         Medications:         Current Outpatient Prescriptions   Medication Sig Dispense Refill   • warfarin (COUMADIN) 5 MG Tab Take 5 mg by mouth every day.     • acetaminophen (TYLENOL) 325 MG Tab Take 650 mg by mouth every four hours as needed.     • fluticasone (FLONASE) 50 MCG/ACT nasal spray Spray 1 Spray in nose every day.     • ferrous sulfate 325 (65 Fe) MG tablet Take 1 Tab by mouth every day. 30 Tab 0   • magnesium oxide (MAG-OX) 400 (241.3 Mg) MG Tab tablet Take 1 Tab by mouth every day. 30 Tab 2   • paroxetine (PAXIL) 40 MG tablet Take 1 Tab by mouth every day. 90 Tab 2   • Pain Pump (PATIENT SUPPLIED) XX LEXI 1 Each by Injection route Continuous. Baclofen 20MCG daily  Bolas 10MCG/ 250MCG per ML  Pt last filled on 7/10/2018     • docosahexanoic acid (FISH OIL) 1000 MG CAPS Take 1,000 mg by mouth every day.     • Natalizumab (TYSABRI IV) 1 Dose by Intravenous route Q 4 Weeks.     • vitamin D (CHOLECALCIFEROL) 1000 UNIT TABS Take 1,000 Units by mouth 2 Times a Day.     • warfarin (COUMADIN) 2.5 MG Tab Take 2.5 mg by mouth every day.     • warfarin (COUMADIN) 10 MG Tab      • warfarin  "(COUMADIN) 7.5 MG Tab Take 1 Tab by mouth every day. 30 Tab 1     No current facility-administered medications for this visit.        Social History:     Social History     Social History   • Marital status:      Spouse name: N/A   • Number of children: N/A   • Years of education: N/A     Occupational History   • Not on file.     Social History Main Topics   • Smoking status: Current Some Day Smoker     Packs/day: 0.25   • Smokeless tobacco: Never Used      Comment: 4 cigs/day, trying to quit   • Alcohol use No   • Drug use: No   • Sexual activity: Not Currently     Other Topics Concern   • Not on file     Social History Narrative   • No narrative on file       Family History:      Family History   Problem Relation Age of Onset   • Hypertension Unknown    • No Known Problems Mother    • Lung Disease Father         emphysema   • No Known Problems Sister    • No Known Problems Child    • Cancer Neg Hx    • Diabetes Neg Hx        Review of Systems   Constitutional: Positive for malaise/fatigue.   HENT: Negative for nosebleeds.    Eyes: Negative for blurred vision and double vision.   Respiratory: Negative for cough, shortness of breath and wheezing.    Cardiovascular: Negative for chest pain and palpitations.   Gastrointestinal: Positive for abdominal pain (cramping ) and heartburn. Negative for constipation, diarrhea, nausea and vomiting.   Genitourinary: Negative for frequency and urgency.   Musculoskeletal: Negative for back pain, falls, myalgias and neck pain.   Neurological: Negative for dizziness and headaches.   Psychiatric/Behavioral: Negative for depression. The patient is not nervous/anxious and does not have insomnia.        Physical Exam not completed today    Vitals: /79 (BP Location: Left arm, Patient Position: Sitting, BP Cuff Size: Adult long)   Pulse 86   Temp 36.7 °C (98.1 °F) (Temporal)   Resp 18   Ht 1.753 m (5' 9\")   Wt 100.8 kg (222 lb 1.8 oz)   SpO2 96%   BMI 32.80 kg/m² " "    Labs:     Anticoagulation Monitoring on 10/11/2018   Component Date Value Ref Range Status   • INR 10/11/2018 2.8   Final   • INR 10/11/2018 2.8   Final   Anticoagulation Monitoring on 10/04/2018   Component Date Value Ref Range Status   • INR 10/04/2018 2.4   Final   Anticoagulation Monitoring on 09/28/2018   Component Date Value Ref Range Status   • INR 09/27/2018 2.4   Final   Hospital Outpatient Visit on 09/26/2018   Component Date Value Ref Range Status   • Cytology Reg 09/26/2018 See Path Report   Final    Comment: A separate pathology report will follow after the Cytology  specimen has been received by the laboratory and the results  are signed out by a pathologist.  Please see \"Pathology GYN Specimen\" order for these results.           Assessment and Plan:  1. Protein S deficiency -- multiple trombotic events , one DVT episode occurring on therapeutic INR as per patient , recently with hematoma , high INR , now corrected.      2. Endometrial mass , vaginal bleeding abnormal , need for surgical exploration .   3. In the setting of this coagulopathy anticoagulation needs to continued indefinitely. For now will mange bridging with LMWH in preparation for hysterectomy . Will decide after surgery if new generation anticoagulants will be an option for this patient.     4. Stop Coumadin 11/10/18   5. Start lovenox 1mg/kg 2x/  Day : 100 mg BID  -- on 11/11/10 in AM   6. STOP Lovenox on 11/14 /18 after AM shot   7. Surgery -- 11/15/18 with Mac -- for endometrial mass   8. Restart lovenox 1 mg/kg 2x/day 1 day after surgery -- and if OK with Dr Watts .   9.. Then if all OK restart coumadin 3 days after surgery , patients present dose, , continue Lovenox for overlap -- for target INR -3.0 for 24 hours then stop Lovenox and continue Coumadin   10. See Dr Vidal in 2 months .  We will evaluate the final pathology after endometrial surgery will be completed.          She agreed and verbalized  agreement and understanding " with the current plan.  I answered all questions and concerns at this time .  She was very comfortable with the plan.       All labs  mentioned in the note above were reviewed with and explained to the patient as a pertain to medical decision making.    Patient was seen for 30 minutes face to face of of appointment time was spent on counseling and coordination of care planning bridging methods, follow-up and further anticoagulation as noted.      Please note that this dictation was created using voice recognition software. I have made every reasonable attempt to correct obvious errors, but I expect that there are errors of grammar and possibly content that I did not discover before finalizing the note.        SIGNATURES:  Alize Schultz    CC:  Elana Gillespie D.O.  No ref. provider found

## 2018-10-11 NOTE — PROGRESS NOTES
Anticoagulation Summary  As of 10/11/2018    INR goal:   3.0-4.0   TTR:   31.2 % (1.7 mo)   Today's INR:   2.8!   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 Referring Family Medicine [phone]    [facility] Anticoagulation Services Responsible  [phone]        Anticoagulation Patient Findings    Spoke with patient.  INR is SUB therapeutic.   Pt denies any unusual s/s of bleeding, bruising, clotting or any changes to diet or medications. Denies any etoh, cranberries, supplements, or illness.   Pt verifies warfarin weekly dosing.     Pt would like to stay at the same warfarin dosing, which was an increase last week, to see if she goes into range next week.    Repeat INR in 1 week(s).     Bea Mabry, PharmD

## 2018-10-18 ENCOUNTER — ANTICOAGULATION MONITORING (OUTPATIENT)
Dept: VASCULAR LAB | Facility: MEDICAL CENTER | Age: 53
End: 2018-10-18

## 2018-10-18 DIAGNOSIS — T45.515A WARFARIN-INDUCED COAGULOPATHY (HCC): ICD-10-CM

## 2018-10-18 DIAGNOSIS — D68.59 PROTEIN S DEFICIENCY (HCC): ICD-10-CM

## 2018-10-18 DIAGNOSIS — D68.32 WARFARIN-INDUCED COAGULOPATHY (HCC): ICD-10-CM

## 2018-10-18 DIAGNOSIS — E83.51 HYPOCALCEMIA: ICD-10-CM

## 2018-10-18 LAB — INR PPP: 3.2 (ref 2–3.5)

## 2018-10-18 NOTE — PROGRESS NOTES
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 Referring Family Medicine [phone]    [facility] Anticoagulation Services Responsible  [phone]        Anticoagulation Patient Findings    HPI:  Nadia Lazaro, on anticoagulation therapy with warfarin for Protein S.   Changes to current medical/health status since last appt: none   Denies signs/symptoms of bleeding and/or thrombosis since the last appt.    Denies any interval changes to diet  Denies any interval changes to medications since last appt.   Denies any complications or cost restrictions with current therapy.     A/P   INR  therapeutic.   Pt is to continue with current warfarin dosing regimen.     Next INR in 1 week(s).    Brandon Puente, PharmD

## 2018-10-29 ENCOUNTER — ANTICOAGULATION MONITORING (OUTPATIENT)
Dept: VASCULAR LAB | Facility: MEDICAL CENTER | Age: 53
End: 2018-10-29

## 2018-10-29 DIAGNOSIS — D68.32 WARFARIN-INDUCED COAGULOPATHY (HCC): ICD-10-CM

## 2018-10-29 DIAGNOSIS — E83.51 HYPOCALCEMIA: ICD-10-CM

## 2018-10-29 DIAGNOSIS — T45.515A WARFARIN-INDUCED COAGULOPATHY (HCC): ICD-10-CM

## 2018-10-29 DIAGNOSIS — D68.59 PROTEIN S DEFICIENCY (HCC): ICD-10-CM

## 2018-10-29 LAB — INR PPP: 3.7 (ref 2–3.5)

## 2018-10-29 NOTE — PROGRESS NOTES
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 Referring Family Medicine [phone]    [facility] Anticoagulation Services Responsible  [phone]        Anticoagulation Patient Findings    HPI:  Nadia Lazaro, on anticoagulation therapy with warfarin for Protein S deficiency.   Changes to current medical/health status since last appt:  None.   Denies signs/symptoms of bleeding and/or thrombosis since the last appt.    Denies any interval changes to diet  Denies any interval changes to medications since last appt.   Denies any complications or cost restrictions with current therapy.     A/P   INR  therapeutic.   Pt is to continue with current warfarin dosing regimen.     Next INR in 1 week(s).    Brandon Puente, PharmD

## 2018-10-30 ENCOUNTER — HOSPITAL ENCOUNTER (OUTPATIENT)
Dept: RADIOLOGY | Facility: MEDICAL CENTER | Age: 53
End: 2018-10-30
Attending: SPECIALIST
Payer: MEDICARE

## 2018-10-30 DIAGNOSIS — R60.0 LOCALIZED EDEMA: ICD-10-CM

## 2018-10-30 PROCEDURE — 93970 EXTREMITY STUDY: CPT

## 2018-11-07 ENCOUNTER — ANTICOAGULATION MONITORING (OUTPATIENT)
Dept: VASCULAR LAB | Facility: MEDICAL CENTER | Age: 53
End: 2018-11-07

## 2018-11-07 DIAGNOSIS — D68.32 WARFARIN-INDUCED COAGULOPATHY (HCC): ICD-10-CM

## 2018-11-07 DIAGNOSIS — E83.51 HYPOCALCEMIA: ICD-10-CM

## 2018-11-07 DIAGNOSIS — D68.59 PROTEIN S DEFICIENCY (HCC): ICD-10-CM

## 2018-11-07 DIAGNOSIS — T45.515A WARFARIN-INDUCED COAGULOPATHY (HCC): ICD-10-CM

## 2018-11-07 LAB — INR PPP: 3.2 (ref 2–3.5)

## 2018-11-08 NOTE — PROGRESS NOTES
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 Referring Family Medicine [phone]    [facility] Anticoagulation Services Responsible  [phone]        Anticoagulation Patient Findings    INR therapeutic at 3.2.  Left voicemail message for pt.  Verified regimen.  Pt to continue on with current regimen.  Told pt to call if any s/s of bleeding, medication changes, and/or any questions.  Check INR in 1 week.  Instructed pt to call clinic at [phone] if there are any questions.    Armin Davenport, Pharmacy Intern

## 2018-11-12 ENCOUNTER — HOSPITAL ENCOUNTER (OUTPATIENT)
Dept: RADIOLOGY | Facility: MEDICAL CENTER | Age: 53
End: 2018-11-12
Attending: SPECIALIST | Admitting: SPECIALIST
Payer: MEDICARE

## 2018-11-12 DIAGNOSIS — Z01.812 PRE-PROCEDURAL LABORATORY EXAMINATION: ICD-10-CM

## 2018-11-12 DIAGNOSIS — Z01.811 PRE-OPERATIVE RESPIRATORY EXAMINATION: ICD-10-CM

## 2018-11-12 LAB
ABO GROUP BLD: NORMAL
ALBUMIN SERPL BCP-MCNC: 4.2 G/DL (ref 3.2–4.9)
ALBUMIN/GLOB SERPL: 1.4 G/DL
ALP SERPL-CCNC: 94 U/L (ref 30–99)
ALT SERPL-CCNC: 15 U/L (ref 2–50)
ANION GAP SERPL CALC-SCNC: 7 MMOL/L (ref 0–11.9)
APTT PPP: 39 SEC (ref 24.7–36)
AST SERPL-CCNC: 17 U/L (ref 12–45)
BASOPHILS # BLD AUTO: 0.7 % (ref 0–1.8)
BASOPHILS # BLD: 0.08 K/UL (ref 0–0.12)
BILIRUB SERPL-MCNC: 0.6 MG/DL (ref 0.1–1.5)
BLD GP AB SCN SERPL QL: NORMAL
BUN SERPL-MCNC: 13 MG/DL (ref 8–22)
CALCIUM SERPL-MCNC: 9.3 MG/DL (ref 8.5–10.5)
CHLORIDE SERPL-SCNC: 108 MMOL/L (ref 96–112)
CO2 SERPL-SCNC: 25 MMOL/L (ref 20–33)
CREAT SERPL-MCNC: 0.89 MG/DL (ref 0.5–1.4)
EOSINOPHIL # BLD AUTO: 0.25 K/UL (ref 0–0.51)
EOSINOPHIL NFR BLD: 2.3 % (ref 0–6.9)
ERYTHROCYTE [DISTWIDTH] IN BLOOD BY AUTOMATED COUNT: 51.4 FL (ref 35.9–50)
GLOBULIN SER CALC-MCNC: 2.9 G/DL (ref 1.9–3.5)
GLUCOSE SERPL-MCNC: 95 MG/DL (ref 65–99)
HCT VFR BLD AUTO: 44.2 % (ref 37–47)
HGB BLD-MCNC: 14.7 G/DL (ref 12–16)
IMM GRANULOCYTES # BLD AUTO: 0.05 K/UL (ref 0–0.11)
IMM GRANULOCYTES NFR BLD AUTO: 0.5 % (ref 0–0.9)
INR PPP: 1.28 (ref 0.87–1.13)
LYMPHOCYTES # BLD AUTO: 4.49 K/UL (ref 1–4.8)
LYMPHOCYTES NFR BLD: 40.5 % (ref 22–41)
MCH RBC QN AUTO: 31.3 PG (ref 27–33)
MCHC RBC AUTO-ENTMCNC: 33.3 G/DL (ref 33.6–35)
MCV RBC AUTO: 94.2 FL (ref 81.4–97.8)
MONOCYTES # BLD AUTO: 0.84 K/UL (ref 0–0.85)
MONOCYTES NFR BLD AUTO: 7.6 % (ref 0–13.4)
NEUTROPHILS # BLD AUTO: 5.38 K/UL (ref 2–7.15)
NEUTROPHILS NFR BLD: 48.4 % (ref 44–72)
NRBC # BLD AUTO: 0.02 K/UL
NRBC BLD-RTO: 0.2 /100 WBC
PLATELET # BLD AUTO: 237 K/UL (ref 164–446)
PMV BLD AUTO: 10.7 FL (ref 9–12.9)
POTASSIUM SERPL-SCNC: 4.8 MMOL/L (ref 3.6–5.5)
PROT SERPL-MCNC: 7.1 G/DL (ref 6–8.2)
PROTHROMBIN TIME: 16.1 SEC (ref 12–14.6)
RBC # BLD AUTO: 4.69 M/UL (ref 4.2–5.4)
RH BLD: NORMAL
SODIUM SERPL-SCNC: 140 MMOL/L (ref 135–145)
WBC # BLD AUTO: 11.1 K/UL (ref 4.8–10.8)

## 2018-11-12 PROCEDURE — 71046 X-RAY EXAM CHEST 2 VIEWS: CPT

## 2018-11-12 PROCEDURE — 86850 RBC ANTIBODY SCREEN: CPT

## 2018-11-12 PROCEDURE — 85730 THROMBOPLASTIN TIME PARTIAL: CPT

## 2018-11-12 PROCEDURE — 36415 COLL VENOUS BLD VENIPUNCTURE: CPT

## 2018-11-12 PROCEDURE — 80053 COMPREHEN METABOLIC PANEL: CPT

## 2018-11-12 PROCEDURE — 85610 PROTHROMBIN TIME: CPT

## 2018-11-12 PROCEDURE — 85025 COMPLETE CBC W/AUTO DIFF WBC: CPT

## 2018-11-12 PROCEDURE — 86901 BLOOD TYPING SEROLOGIC RH(D): CPT

## 2018-11-12 PROCEDURE — 86900 BLOOD TYPING SEROLOGIC ABO: CPT

## 2018-11-15 ENCOUNTER — HOSPITAL ENCOUNTER (OUTPATIENT)
Facility: MEDICAL CENTER | Age: 53
End: 2018-11-15
Attending: SPECIALIST | Admitting: SPECIALIST
Payer: MEDICARE

## 2018-11-15 VITALS
SYSTOLIC BLOOD PRESSURE: 118 MMHG | DIASTOLIC BLOOD PRESSURE: 67 MMHG | HEIGHT: 69 IN | RESPIRATION RATE: 14 BRPM | BODY MASS INDEX: 32.59 KG/M2 | OXYGEN SATURATION: 90 % | WEIGHT: 220.02 LBS | HEART RATE: 71 BPM | TEMPERATURE: 98.2 F

## 2018-11-15 DIAGNOSIS — G89.18 POST-OP PAIN: ICD-10-CM

## 2018-11-15 LAB
INR PPP: 1.01 (ref 0.87–1.13)
PROTHROMBIN TIME: 13.4 SEC (ref 12–14.6)

## 2018-11-15 PROCEDURE — 160009 HCHG ANES TIME/MIN: Performed by: SPECIALIST

## 2018-11-15 PROCEDURE — 502779 HCHG SUTURE, QUILL: Performed by: SPECIALIST

## 2018-11-15 PROCEDURE — 700102 HCHG RX REV CODE 250 W/ 637 OVERRIDE(OP): Performed by: ANESTHESIOLOGY

## 2018-11-15 PROCEDURE — 501838 HCHG SUTURE GENERAL: Performed by: SPECIALIST

## 2018-11-15 PROCEDURE — 160048 HCHG OR STATISTICAL LEVEL 1-5: Performed by: SPECIALIST

## 2018-11-15 PROCEDURE — 502714 HCHG ROBOTIC SURGERY SERVICES: Performed by: SPECIALIST

## 2018-11-15 PROCEDURE — 160031 HCHG SURGERY MINUTES - 1ST 30 MINS LEVEL 5: Performed by: SPECIALIST

## 2018-11-15 PROCEDURE — 700101 HCHG RX REV CODE 250: Performed by: SPECIALIST

## 2018-11-15 PROCEDURE — 160046 HCHG PACU - 1ST 60 MINS PHASE II: Performed by: SPECIALIST

## 2018-11-15 PROCEDURE — 700111 HCHG RX REV CODE 636 W/ 250 OVERRIDE (IP): Performed by: SPECIALIST

## 2018-11-15 PROCEDURE — 700101 HCHG RX REV CODE 250

## 2018-11-15 PROCEDURE — A9270 NON-COVERED ITEM OR SERVICE: HCPCS | Performed by: ANESTHESIOLOGY

## 2018-11-15 PROCEDURE — 160002 HCHG RECOVERY MINUTES (STAT): Performed by: SPECIALIST

## 2018-11-15 PROCEDURE — 160035 HCHG PACU - 1ST 60 MINS PHASE I: Performed by: SPECIALIST

## 2018-11-15 PROCEDURE — 500864 HCHG NEEDLE, SPINAL 18G: Performed by: SPECIALIST

## 2018-11-15 PROCEDURE — 88331 PATH CONSLTJ SURG 1 BLK 1SPC: CPT

## 2018-11-15 PROCEDURE — 700111 HCHG RX REV CODE 636 W/ 250 OVERRIDE (IP)

## 2018-11-15 PROCEDURE — 160042 HCHG SURGERY MINUTES - EA ADDL 1 MIN LEVEL 5: Performed by: SPECIALIST

## 2018-11-15 PROCEDURE — A6402 STERILE GAUZE <= 16 SQ IN: HCPCS | Performed by: SPECIALIST

## 2018-11-15 PROCEDURE — 160036 HCHG PACU - EA ADDL 30 MINS PHASE I: Performed by: SPECIALIST

## 2018-11-15 PROCEDURE — 700105 HCHG RX REV CODE 258: Performed by: SPECIALIST

## 2018-11-15 PROCEDURE — 700111 HCHG RX REV CODE 636 W/ 250 OVERRIDE (IP): Performed by: ANESTHESIOLOGY

## 2018-11-15 PROCEDURE — 501399 HCHG SPECIMAN BAG, ENDO CATC: Performed by: SPECIALIST

## 2018-11-15 PROCEDURE — 88307 TISSUE EXAM BY PATHOLOGIST: CPT

## 2018-11-15 PROCEDURE — 160025 RECOVERY II MINUTES (STATS): Performed by: SPECIALIST

## 2018-11-15 PROCEDURE — 85610 PROTHROMBIN TIME: CPT

## 2018-11-15 PROCEDURE — 500854 HCHG NEEDLE, INSUFFLATION FOR STEP: Performed by: SPECIALIST

## 2018-11-15 RX ORDER — OXYCODONE HCL 5 MG/5 ML
5 SOLUTION, ORAL ORAL
Status: COMPLETED | OUTPATIENT
Start: 2018-11-15 | End: 2018-11-15

## 2018-11-15 RX ORDER — HYDROCODONE BITARTRATE AND ACETAMINOPHEN 5; 325 MG/1; MG/1
1-2 TABLET ORAL EVERY 6 HOURS PRN
Qty: 28 TAB | Refills: 0 | Status: SHIPPED | OUTPATIENT
Start: 2018-11-15 | End: 2018-11-22

## 2018-11-15 RX ORDER — MAGNESIUM HYDROXIDE 1200 MG/15ML
LIQUID ORAL
Status: DISCONTINUED | OUTPATIENT
Start: 2018-11-15 | End: 2018-11-15 | Stop reason: HOSPADM

## 2018-11-15 RX ORDER — PAROXETINE HYDROCHLORIDE 40 MG/1
40 TABLET, FILM COATED ORAL EVERY EVENING
COMMUNITY
End: 2019-01-03 | Stop reason: SDUPTHER

## 2018-11-15 RX ORDER — METOCLOPRAMIDE HYDROCHLORIDE 5 MG/ML
10 INJECTION INTRAMUSCULAR; INTRAVENOUS EVERY 6 HOURS
Status: DISCONTINUED | OUTPATIENT
Start: 2018-11-15 | End: 2018-11-15 | Stop reason: HOSPADM

## 2018-11-15 RX ORDER — HALOPERIDOL 5 MG/ML
1 INJECTION INTRAMUSCULAR
Status: DISCONTINUED | OUTPATIENT
Start: 2018-11-15 | End: 2018-11-15 | Stop reason: HOSPADM

## 2018-11-15 RX ORDER — ONDANSETRON 2 MG/ML
4 INJECTION INTRAMUSCULAR; INTRAVENOUS
Status: DISCONTINUED | OUTPATIENT
Start: 2018-11-15 | End: 2018-11-15 | Stop reason: HOSPADM

## 2018-11-15 RX ORDER — OXYCODONE HCL 20 MG/1
20 TABLET, FILM COATED, EXTENDED RELEASE ORAL ONCE
Status: COMPLETED | OUTPATIENT
Start: 2018-11-15 | End: 2018-11-15

## 2018-11-15 RX ORDER — KETOROLAC TROMETHAMINE 30 MG/ML
30 INJECTION, SOLUTION INTRAMUSCULAR; INTRAVENOUS EVERY 6 HOURS PRN
Status: DISCONTINUED | OUTPATIENT
Start: 2018-11-15 | End: 2018-11-15 | Stop reason: HOSPADM

## 2018-11-15 RX ORDER — KETOROLAC TROMETHAMINE 30 MG/ML
INJECTION, SOLUTION INTRAMUSCULAR; INTRAVENOUS
Status: DISCONTINUED
Start: 2018-11-15 | End: 2018-11-15 | Stop reason: HOSPADM

## 2018-11-15 RX ORDER — GABAPENTIN 300 MG/1
600 CAPSULE ORAL ONCE
Status: COMPLETED | OUTPATIENT
Start: 2018-11-15 | End: 2018-11-15

## 2018-11-15 RX ORDER — HYDROCODONE BITARTRATE AND ACETAMINOPHEN 5; 325 MG/1; MG/1
1-2 TABLET ORAL EVERY 6 HOURS PRN
Status: DISCONTINUED | OUTPATIENT
Start: 2018-11-15 | End: 2018-11-15 | Stop reason: HOSPADM

## 2018-11-15 RX ORDER — ONDANSETRON 4 MG/1
4 TABLET, FILM COATED ORAL EVERY 6 HOURS PRN
Qty: 30 TAB | Refills: 0 | Status: SHIPPED
Start: 2018-11-15 | End: 2019-01-03

## 2018-11-15 RX ORDER — DIPHENHYDRAMINE HCL 25 MG
25 TABLET ORAL NIGHTLY PRN
COMMUNITY

## 2018-11-15 RX ORDER — SODIUM CHLORIDE, SODIUM LACTATE, POTASSIUM CHLORIDE, CALCIUM CHLORIDE 600; 310; 30; 20 MG/100ML; MG/100ML; MG/100ML; MG/100ML
INJECTION, SOLUTION INTRAVENOUS ONCE
Status: COMPLETED | OUTPATIENT
Start: 2018-11-15 | End: 2018-11-15

## 2018-11-15 RX ORDER — HYDROMORPHONE HYDROCHLORIDE 1 MG/ML
0.4 INJECTION, SOLUTION INTRAMUSCULAR; INTRAVENOUS; SUBCUTANEOUS
Status: DISCONTINUED | OUTPATIENT
Start: 2018-11-15 | End: 2018-11-15 | Stop reason: HOSPADM

## 2018-11-15 RX ORDER — CELECOXIB 200 MG/1
400 CAPSULE ORAL ONCE
Status: COMPLETED | OUTPATIENT
Start: 2018-11-15 | End: 2018-11-15

## 2018-11-15 RX ORDER — SODIUM CHLORIDE, SODIUM LACTATE, POTASSIUM CHLORIDE, CALCIUM CHLORIDE 600; 310; 30; 20 MG/100ML; MG/100ML; MG/100ML; MG/100ML
INJECTION, SOLUTION INTRAVENOUS CONTINUOUS
Status: DISCONTINUED | OUTPATIENT
Start: 2018-11-15 | End: 2018-11-15 | Stop reason: HOSPADM

## 2018-11-15 RX ORDER — DIPHENHYDRAMINE HYDROCHLORIDE 50 MG/ML
12.5 INJECTION INTRAMUSCULAR; INTRAVENOUS
Status: DISCONTINUED | OUTPATIENT
Start: 2018-11-15 | End: 2018-11-15 | Stop reason: HOSPADM

## 2018-11-15 RX ORDER — WARFARIN SODIUM 5 MG/1
2.5-5 TABLET ORAL DAILY
Status: ON HOLD | COMMUNITY
End: 2018-11-15

## 2018-11-15 RX ORDER — OXYCODONE HYDROCHLORIDE 5 MG/1
10 TABLET ORAL
Status: DISCONTINUED | OUTPATIENT
Start: 2018-11-15 | End: 2018-11-15 | Stop reason: HOSPADM

## 2018-11-15 RX ORDER — WARFARIN SODIUM 2.5 MG/1
2.5 TABLET ORAL DAILY
COMMUNITY
End: 2018-12-12

## 2018-11-15 RX ORDER — BUPIVACAINE HYDROCHLORIDE AND EPINEPHRINE 2.5; 5 MG/ML; UG/ML
INJECTION, SOLUTION EPIDURAL; INFILTRATION; INTRACAUDAL; PERINEURAL
Status: DISCONTINUED | OUTPATIENT
Start: 2018-11-15 | End: 2018-11-15 | Stop reason: HOSPADM

## 2018-11-15 RX ORDER — OXYCODONE HCL 5 MG/5 ML
10 SOLUTION, ORAL ORAL
Status: COMPLETED | OUTPATIENT
Start: 2018-11-15 | End: 2018-11-15

## 2018-11-15 RX ORDER — MEPERIDINE HYDROCHLORIDE 25 MG/ML
6.25 INJECTION INTRAMUSCULAR; INTRAVENOUS; SUBCUTANEOUS
Status: DISCONTINUED | OUTPATIENT
Start: 2018-11-15 | End: 2018-11-15 | Stop reason: HOSPADM

## 2018-11-15 RX ORDER — LABETALOL HYDROCHLORIDE 5 MG/ML
5 INJECTION, SOLUTION INTRAVENOUS
Status: DISCONTINUED | OUTPATIENT
Start: 2018-11-15 | End: 2018-11-15 | Stop reason: HOSPADM

## 2018-11-15 RX ORDER — ACETAMINOPHEN 500 MG
1000 TABLET ORAL ONCE
Status: COMPLETED | OUTPATIENT
Start: 2018-11-15 | End: 2018-11-15

## 2018-11-15 RX ORDER — ONDANSETRON 2 MG/ML
4 INJECTION INTRAMUSCULAR; INTRAVENOUS EVERY 6 HOURS PRN
Status: DISCONTINUED | OUTPATIENT
Start: 2018-11-15 | End: 2018-11-15 | Stop reason: HOSPADM

## 2018-11-15 RX ORDER — OXYCODONE AND ACETAMINOPHEN 7.5; 325 MG/1; MG/1
1-2 TABLET ORAL EVERY 6 HOURS PRN
Status: DISCONTINUED | OUTPATIENT
Start: 2018-11-15 | End: 2018-11-15 | Stop reason: HOSPADM

## 2018-11-15 RX ORDER — CALCIUM CARBONATE 500 MG/1
500 TABLET, CHEWABLE ORAL DAILY
COMMUNITY

## 2018-11-15 RX ORDER — WARFARIN SODIUM 5 MG/1
5 TABLET ORAL DAILY
COMMUNITY
End: 2018-12-12

## 2018-11-15 RX ORDER — HYDROMORPHONE HYDROCHLORIDE 1 MG/ML
0.1 INJECTION, SOLUTION INTRAMUSCULAR; INTRAVENOUS; SUBCUTANEOUS
Status: DISCONTINUED | OUTPATIENT
Start: 2018-11-15 | End: 2018-11-15 | Stop reason: HOSPADM

## 2018-11-15 RX ORDER — HYDROMORPHONE HYDROCHLORIDE 1 MG/ML
0.2 INJECTION, SOLUTION INTRAMUSCULAR; INTRAVENOUS; SUBCUTANEOUS
Status: DISCONTINUED | OUTPATIENT
Start: 2018-11-15 | End: 2018-11-15 | Stop reason: HOSPADM

## 2018-11-15 RX ORDER — OXYCODONE HYDROCHLORIDE 5 MG/1
5 TABLET ORAL
Status: DISCONTINUED | OUTPATIENT
Start: 2018-11-15 | End: 2018-11-15 | Stop reason: HOSPADM

## 2018-11-15 RX ADMIN — ACETAMINOPHEN 1000 MG: 500 TABLET, FILM COATED ORAL at 08:28

## 2018-11-15 RX ADMIN — FENTANYL CITRATE 50 MCG: 50 INJECTION, SOLUTION INTRAMUSCULAR; INTRAVENOUS at 18:35

## 2018-11-15 RX ADMIN — GABAPENTIN 600 MG: 300 CAPSULE ORAL at 08:28

## 2018-11-15 RX ADMIN — KETOROLAC TROMETHAMINE 30 MG: 30 INJECTION, SOLUTION INTRAMUSCULAR; INTRAVENOUS at 19:11

## 2018-11-15 RX ADMIN — FENTANYL CITRATE 50 MCG: 50 INJECTION, SOLUTION INTRAMUSCULAR; INTRAVENOUS at 18:15

## 2018-11-15 RX ADMIN — OXYCODONE HYDROCHLORIDE 10 MG: 5 SOLUTION ORAL at 19:10

## 2018-11-15 RX ADMIN — CELECOXIB 400 MG: 200 CAPSULE ORAL at 08:28

## 2018-11-15 RX ADMIN — SODIUM CHLORIDE, SODIUM LACTATE, POTASSIUM CHLORIDE, CALCIUM CHLORIDE: 600; 310; 30; 20 INJECTION, SOLUTION INTRAVENOUS at 08:27

## 2018-11-15 RX ADMIN — OXYCODONE HYDROCHLORIDE 20 MG: 20 TABLET, FILM COATED, EXTENDED RELEASE ORAL at 08:28

## 2018-11-15 ASSESSMENT — PAIN SCALES - GENERAL
PAINLEVEL_OUTOF10: 4
PAINLEVEL_OUTOF10: 2
PAINLEVEL_OUTOF10: ASSUMED PAIN PRESENT
PAINLEVEL_OUTOF10: ASSUMED PAIN PRESENT
PAINLEVEL_OUTOF10: 2
PAINLEVEL_OUTOF10: 2
PAINLEVEL_OUTOF10: 8
PAINLEVEL_OUTOF10: 8

## 2018-11-15 NOTE — PROGRESS NOTES
Pre op assessment complete, pt's VSS, pt and family updated on POC. Call light within reach, pt denies any other needs at this time.     Blood sent to lab for INR and 2nd COD

## 2018-11-16 LAB — PATHOLOGY CONSULT NOTE: NORMAL

## 2018-11-16 NOTE — OR NURSING
Patient arrived in phase 2 reporting minimal pain, no nausea and motivated for discharge. 2010 Patient has been up to void once. 2030 Patient dressed, and is ready for discharge.    2045 Reviewed discharge instructions with patient. All expressed understanding. IV removed, and patient discharged at  with all belongings in her possession and escorted via wheelchair by RN.    2055 Patient discharged via wheelchair, with all belongings in her possession.    Note corrected due to wrong patient.

## 2018-11-16 NOTE — DISCHARGE INSTRUCTIONS
ACTIVITY: Rest and take it easy for the first 24 hours.  A responsible adult is recommended to remain with you during that time.  It is normal to feel sleepy.  We encourage you to not do anything that requires balance, judgment or coordination.    MILD FLU-LIKE SYMPTOMS ARE NORMAL. YOU MAY EXPERIENCE GENERALIZED MUSCLE ACHES, THROAT IRRITATION, HEADACHE AND/OR SOME NAUSEA.    FOR 24 HOURS DO NOT:  Drive, operate machinery or run household appliances.  Drink beer or alcoholic beverages.   Make important decisions or sign legal documents.    SPECIAL INSTRUCTIONS:     1. No heavy lifting greater than 10 pounds for minimum 6 weeks   2. Nothing in vagina (ie no tampons, douching, intercourse) for minimum of 6 weeks   3. No driving while taking narcotics   4. Return to our office as directed and call to confirm appointment   Call our office [phone] if you develop any fevers, chills, nausea/vomiting, heavy vaginal bleeding, or redness, tenderness, and/or drainage from your wound, if you have persistent watery discharge while ambulating or stool draining from the vagina .   5. Showering is ok after shower make sure wound is dry.   6. You may keep the wound dressing and change everyday. After 2 weeks from surgery you may keep the wound dressing off.   7. You may have vaginal spotting or light bleeding which is normal.   8. If you have not had a bowel movement for 2 days, please take over the counter Milk of Magnesium, 1 tablespoon every 4 hours. After 4 doses and if you still have not had a bowel movement, please call your doctor.   9. You may eat soft diet, such as soup, liquid, for day #1 and if tolerating you may resume your regular diet.    DIET: To avoid nausea, slowly advance diet as tolerated, avoiding spicy or greasy foods for the first day.  Add more substantial food to your diet according to your physician's instructions.  Babies can be fed formula or breast milk as soon as they are hungry.  INCREASE FLUIDS  AND FIBER TO AVOID CONSTIPATION.    SURGICAL DRESSING/BATHING: See above    FOLLOW-UP APPOINTMENT:  A follow-up appointment should be arranged with your doctor in 1-2 weeks; call to schedule.    You should CALL YOUR PHYSICIAN if you develop:  Fever greater than 101 degrees F.  Pain not relieved by medication, or persistent nausea or vomiting.  Excessive bleeding (blood soaking through dressing) or unexpected drainage from the wound.  Extreme redness or swelling around the incision site, drainage of pus or foul smelling drainage.  Inability to urinate or empty your bladder within 8 hours.  Problems with breathing or chest pain.    You should call 911 if you develop problems with breathing or chest pain.  If you are unable to contact your doctor or surgical center, you should go to the nearest emergency room or urgent care center.  Physician's telephone #: [phone]    If any questions arise, call your doctor.  If your doctor is not available, please feel free to call the Surgical Center at [phone].  The Center is open Monday through Friday from 7AM to 7PM.  You can also call the [facility] HOTLINE open 24 hours/day, 7 days/week and speak to a nurse at [phone], or toll free at [phone].    A registered nurse may call you a few days after your surgery to see how you are doing after your procedure.    MEDICATIONS: Resume taking daily medication.  Take prescribed pain medication with food.  If no medication is prescribed, you may take non-aspirin pain medication if needed.  PAIN MEDICATION CAN BE VERY CONSTIPATING.  Take a stool softener or laxative such as senokot, pericolace, or milk of magnesia if needed.    Prescription given for Norco (Pain).  Last pain medication given at 7:10pm. Next dose of pain medication can be taken at 1:10am.    If your physician has prescribed pain medication that includes Acetaminophen (Tylenol), do not take additional Acetaminophen (Tylenol) while taking the prescribed  medication.    Depression / Suicide Risk    As you are discharged from this [facility] facility, it is important to learn how to keep safe from harming yourself.    Recognize the warning signs:  · Abrupt changes in personality, positive or negative- including increase in energy   · Giving away possessions  · Change in eating patterns- significant weight changes-  positive or negative  · Change in sleeping patterns- unable to sleep or sleeping all the time   · Unwillingness or inability to communicate  · Depression  · Unusual sadness, discouragement and loneliness  · Talk of wanting to die  · Neglect of personal appearance   · Rebelliousness- reckless behavior  · Withdrawal from people/activities they love  · Confusion- inability to concentrate     If you or a loved one observes any of these behaviors or has concerns about self-harm, here's what you can do:  · Talk about it- your feelings and reasons for harming yourself  · Remove any means that you might use to hurt yourself (examples: pills, rope, extension cords, firearm)  · Get professional help from the community (Mental Health, Substance Abuse, psychological counseling)  · Do not be alone:Call your Safe Contact- someone whom you trust who will be there for you.  · Call your local CRISIS HOTLINE 193-6282 or 242-228-8283  · Call your local Children's Mobile Crisis Response Team [place] (901) 759-2266 or www.FanM[employer]  · Call the toll free National Suicide Prevention Hotlines   · National Suicide Prevention Lifeline 558-058-HOUR (5584)  · National Hope Line Network 800-SUICIDE (326-1312)

## 2018-11-21 NOTE — OR SURGEON
Immediate Post OP Note    PreOp Diagnosis: Pelvic mass    PostOp Diagnosis: uterine fibroid    Procedure(s):  HYSTERECTOMY ROBOTIC XI - Wound Class: Clean Contaminated  SALPINGECTOMY - Wound Class: Clean Contaminated  OOPHORECTOMY - Wound Class: Clean Contaminated    Surgeon(s):  Santo Watts M.D.    Anesthesiologist/Type of Anesthesia:  Anesthesiologist: Brandon Guillory D.O./General    Surgical Staff:  Assistant: SUSAN Jordan  Circulator: Elana Otto R.N.; Raeann Bishop R.N.  Relief Scrub: Inna Middleton  Scrub Person: Magalis Hernandez; Sofia Eaton    Specimens removed if any:  ID Type Source Tests Collected by Time Destination   A : cervix, uterus, right Fallopian tube, left Fallopian tube, right ovary, left ovary Tissue Uterus PATHOLOGY SPECIMEN Santo Watts M.D. 11/15/2018  4:53 PM        Estimated Blood Loss: 50 cc    Findings: fibroid uterus     Complications: none        11/20/2018 11:31 PM Santo Watts M.D.

## 2018-11-21 NOTE — OP REPORT
DATE OF SERVICE:  11/15/2018    PREOPERATIVE DIAGNOSIS:  Intrauterine mass, highly suspicious for endometrial   cancer.    POSTOPERATIVE DIAGNOSES:  1.  No evidence of endometrial cancer.  2.  Uterine fibroid.    PROCEDURE PERFORMED:  Robotic-assisted hysterectomy with  bilateral   salpingo-oophorectomy.    SURGEON:  Santo Watts MD    ASSISTANT:  Pilar Thibodeaux PA-C    ANESTHESIA:  General.    ANESTHESIOLOGIST:  Brandon Guillory DO    ESTIMATED BLOOD LOSS:  50 mL    FLUIDS:  Per Dr. Guillory.    URINE OUTPUT:  As per Dr. Guillory.    COMPLICATIONS:  None.    COUNTS:  Final sponge and needle counts correct.    INDICATIONS FOR SURGERY:  The patient is a 53-year-old female who was referred   to me by Dr. Patino because of a concern for an intrauterine mass.  Patient   apparently had significant bleeding, which required blood transfusion.  She   is also noted to have a DVT.  She is on Coumadin.  After she is being   monitored by hematologist/oncologist, Dr. Leblanc.  She was seen preoperatively   by Dr. Leblanc and recommendation was made in preparation for surgery.  Risks,   benefits, and rationale of these procedures were reviewed with the patient in   detail.  Patient is understanding of these risks and wished to proceed with   the surgery as planned.    INTRAOPERATIVE FINDINGS:  1.  No evidence of ascites.  2.  .  Normal right and left diaphragm.  Liver capsule smooth.  Stomach   appeared grossly normal.  Abdominal peritoneal surfaces were unremarkable.    Omentum appeared grossly normal.  3.  In the pelvis, uterus was clinically consistent with uterine fibroid.  The   adnexal structures were unremarkable.  There was no seeding along the   bladder, pelvic and cul-de-sac peritoneum.  4.  Intraoperative frozen section revealed no evidence of malignancy.    PROCEDURE NOTE:  Patient was given the IV antibiotics prior to procedure.    Patient was prepped and draped and placed in modified dorsal lithotomy    position.    A 1 cm supraumbilical incision was made.  A Veress needle was inserted without   difficulty.  Pneumoperitoneum was achieved to the abdominal pressure of 15   mmHg.  A 8 mm trocar was inserted without difficulty. Two 8 mm ports were   placed in the right upper quadrant 8 cm apart while one 8 mm port was placed  in the left upper quadrant 8 cm apart.  After completion of this, the patient was   placed in steep Trendelenburg position.  The robotic system was then docked   and after docking the robotic system, the instrumentation was inserted under  direct laparoscopic visualiztion to insure that there was no injury to the abdominal   contents.  Once this was completed, the robotic camera was then docked.  We  then proceeded with our da Kirt portion of the procedure.    The posterior leaf of the broad ligament was incised.  The avascular space  of Graves was then created.  The right and left pelvic ureters were identified.    The ovarian vessels were then subsequently isolated and bipolar cautery was  used to cauterize the right and left IP and subsequently divided.  The medial leaf   of the peritoneum was then incised down to the level of the uterosacral ligament.    Ureters were dissected laterally.  Uterine artery and vein were then subsequently   skeletonized.  Using the bipolar cautery, the uterine artery and vein were then   cauterized juxtaposition to the fundus of the uterus.  The remainder of the lower  uterine segment was likewise divided.  The uterosacral ligaments were then  independently divided.  Great care was then taken to clearly not injure the ureter.   After the uterosacral ligaments were then divided, the anterior branches of the  uterine vessels were then subsequently skeletonized and cauterized with bipolar   cautery and divided.  The bladder peritoneum was then subsequently taken down.    Once we were assured that the bladder was dissected off the paracervical fascia,   an anterior  colpotomy was made.  The vagina was circumferentially incised to   complete the hysterectomy and BSO.  The specimen was removed through the   vaginal vault without difficulty.  The vaginal cuff was then closed with O Quill PDS   suture in 2 layer running fashion.      Intraoperative frozen section revealed no evidence of malignancy, thus we did   not proceed with surgical staging.    Pelvis was copiously irrigated with water.  Hemostasis was   established.  Once this was established, we counted for sponges, needles and   instrument counts.  Once this was counted for, robotic instrumentation was   removed.  Robotic system was then de-docked.  Pneumoperitoneum was allowed to   escape through 8 mm port.  Subcutaneous fat was copiously irrigated with   water.  The skin was reapproximated with 3-0 Monocryl sutures.         ____________________________________     ROS VALDEZ MD    PCL / NTS    DD:  11/20/2018 23:37:03  DT:  11/21/2018 00:59:15    D#:  [id number]  Job#:  [id number]    cc: IRAM DOMINGUEZ MD, Alize Schultz MD

## 2018-11-26 ENCOUNTER — ANTICOAGULATION MONITORING (OUTPATIENT)
Dept: VASCULAR LAB | Facility: MEDICAL CENTER | Age: 53
End: 2018-11-26

## 2018-11-26 DIAGNOSIS — T45.515A WARFARIN-INDUCED COAGULOPATHY (HCC): ICD-10-CM

## 2018-11-26 DIAGNOSIS — E83.51 HYPOCALCEMIA: ICD-10-CM

## 2018-11-26 DIAGNOSIS — D68.32 WARFARIN-INDUCED COAGULOPATHY (HCC): ICD-10-CM

## 2018-11-26 DIAGNOSIS — D68.59 PROTEIN S DEFICIENCY (HCC): ICD-10-CM

## 2018-11-26 LAB — INR PPP: 3.2 (ref 2–3.5)

## 2018-11-27 NOTE — PROGRESS NOTES
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 Referring Family Medicine [phone]    [facility] Anticoagulation Services Responsible  [phone]        Anticoagulation Patient Findings       INR therapeutic at 3.2.  Spoke w/ pt on phone.  Verified regimen w/ pt.  Pt had recent hysterectomy and was bridging with Lovenox from 11/10-11/25. She stated that she tested her INR 11/25 and it was at 3.9 so she \"ate a lot of vegetables\" and it was at 3.2 this AM (11/26). Counseled pt to be consistent with her green vegetable intake.  NO s/s bleeding reported per pt.  NO changes in diet reported per pt.  NO changes in medications reported per pt.  Check INR in 1 week(s).  Instructed pt to call clinic at [phone] if there are any questions.  Pt stated understanding.    Armin Davenport, Pharmacy Intern        "

## 2018-12-07 ENCOUNTER — ANTICOAGULATION MONITORING (OUTPATIENT)
Dept: VASCULAR LAB | Facility: MEDICAL CENTER | Age: 53
End: 2018-12-07

## 2018-12-07 DIAGNOSIS — T45.515A WARFARIN-INDUCED COAGULOPATHY (HCC): ICD-10-CM

## 2018-12-07 DIAGNOSIS — D68.59 PROTEIN S DEFICIENCY (HCC): ICD-10-CM

## 2018-12-07 DIAGNOSIS — E83.51 HYPOCALCEMIA: ICD-10-CM

## 2018-12-07 DIAGNOSIS — D68.32 WARFARIN-INDUCED COAGULOPATHY (HCC): ICD-10-CM

## 2018-12-07 LAB — INR PPP: 3.3 (ref 2–3.5)

## 2018-12-08 NOTE — PROGRESS NOTES
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 Referring Family Medicine [phone]    [facility] Anticoagulation Services Responsible  [phone]        Anticoagulation Patient Findings  Patient Findings     Negatives:   Signs/symptoms of thrombosis, Signs/symptoms of bleeding, Laboratory test error suspected, Change in health, Change in alcohol use, Change in activity, Upcoming invasive procedure, Emergency department visit, Upcoming dental procedure, Missed doses, Extra doses, Change in medications, Change in diet/appetite, Hospital admission, Bruising, Other complaints        Spoke with patient to report a therapeutic INR.  Pt instructed to continue with current warfarin dosing regimen. Pt denies any s/s of bleeding, bruising, clotting or any changes to diet or medication.  Will follow up in 2 weeks.    Nando Eugene, Med Ass't      I have reviewed and concur with the above plan     Cristo Mathur, PharmD

## 2018-12-12 ENCOUNTER — OFFICE VISIT (OUTPATIENT)
Dept: HEMATOLOGY ONCOLOGY | Facility: MEDICAL CENTER | Age: 53
End: 2018-12-12
Payer: MEDICARE

## 2018-12-12 VITALS
OXYGEN SATURATION: 96 % | WEIGHT: 216.93 LBS | TEMPERATURE: 98.1 F | HEIGHT: 69 IN | RESPIRATION RATE: 16 BRPM | DIASTOLIC BLOOD PRESSURE: 70 MMHG | HEART RATE: 87 BPM | BODY MASS INDEX: 32.13 KG/M2 | SYSTOLIC BLOOD PRESSURE: 118 MMHG

## 2018-12-12 DIAGNOSIS — D50.9 IRON DEFICIENCY ANEMIA, UNSPECIFIED IRON DEFICIENCY ANEMIA TYPE: ICD-10-CM

## 2018-12-12 DIAGNOSIS — D68.9 COAGULOPATHY (HCC): ICD-10-CM

## 2018-12-12 PROCEDURE — 99213 OFFICE O/P EST LOW 20 MIN: CPT | Performed by: INTERNAL MEDICINE

## 2018-12-12 ASSESSMENT — PAIN SCALES - GENERAL: PAINLEVEL: NO PAIN

## 2018-12-12 NOTE — TELEPHONE ENCOUNTER
Was the patient seen in the last year in this department? Yes    Does patient have an active prescription for medications requested? No     Received Request Via: Pharmacy    Request sent via fax for Magnesium oxide 400 mg tab.

## 2018-12-19 ENCOUNTER — TELEPHONE (OUTPATIENT)
Dept: HEMATOLOGY ONCOLOGY | Facility: MEDICAL CENTER | Age: 53
End: 2018-12-19

## 2018-12-19 DIAGNOSIS — O22.30 DVT (DEEP VEIN THROMBOSIS) IN PREGNANCY: ICD-10-CM

## 2018-12-19 NOTE — TELEPHONE ENCOUNTER
Patient called and stated she is not able to afford her co pay for Eliquis ($138.00). She would like to know if she can get Jantoven prescribed instead which her insurance does cover.

## 2018-12-20 NOTE — TELEPHONE ENCOUNTER
Called patient to let her know 's message below.    Erna Skinner M.D.   You; CLARITA Holland. 20 hours ago (3:44 PM)      She can not have Jantoven b/c this is coumadin. We are trying to switch her from Coumadin/Warfaring to direct oral anticoagulant such as Xarelto or Eliquis. Can we have financial counselors help with pricing? I will enter the new prescription for xarelto instead and a referral to financial help. Thanks,   L (Routing comment)      Also per  she prescribed Xarelto and hopefully it is cheaper then eliquis. Dr. Skinner put a referral for a [facility] Counselor as well, left héctor

## 2019-01-02 ENCOUNTER — TELEPHONE (OUTPATIENT)
Dept: MEDICAL GROUP | Facility: PHYSICIAN GROUP | Age: 54
End: 2019-01-02

## 2019-01-03 ENCOUNTER — OFFICE VISIT (OUTPATIENT)
Dept: MEDICAL GROUP | Facility: PHYSICIAN GROUP | Age: 54
End: 2019-01-03
Payer: MEDICARE

## 2019-01-03 VITALS
HEART RATE: 81 BPM | TEMPERATURE: 97.3 F | DIASTOLIC BLOOD PRESSURE: 78 MMHG | RESPIRATION RATE: 18 BRPM | HEIGHT: 69 IN | SYSTOLIC BLOOD PRESSURE: 134 MMHG | OXYGEN SATURATION: 98 % | WEIGHT: 214 LBS | BODY MASS INDEX: 31.7 KG/M2

## 2019-01-03 DIAGNOSIS — F17.200 SMOKER: ICD-10-CM

## 2019-01-03 DIAGNOSIS — K30 INDIGESTION: ICD-10-CM

## 2019-01-03 DIAGNOSIS — F32.5 MAJOR DEPRESSIVE DISORDER WITH SINGLE EPISODE, IN FULL REMISSION (HCC): ICD-10-CM

## 2019-01-03 DIAGNOSIS — E78.5 HYPERLIPIDEMIA, UNSPECIFIED HYPERLIPIDEMIA TYPE: ICD-10-CM

## 2019-01-03 DIAGNOSIS — G35 MS (MULTIPLE SCLEROSIS) (HCC): ICD-10-CM

## 2019-01-03 DIAGNOSIS — Z12.39 BREAST CANCER SCREENING: ICD-10-CM

## 2019-01-03 DIAGNOSIS — D68.59 PROTEIN S DEFICIENCY (HCC): ICD-10-CM

## 2019-01-03 PROBLEM — R10.9 ABDOMINAL PAIN: Status: ACTIVE | Noted: 2019-01-03

## 2019-01-03 PROCEDURE — 8041 PR SCP AHA: Performed by: INTERNAL MEDICINE

## 2019-01-03 PROCEDURE — 99204 OFFICE O/P NEW MOD 45 MIN: CPT | Performed by: INTERNAL MEDICINE

## 2019-01-03 RX ORDER — NICOTINE 21 MG/24HR
PATCH, TRANSDERMAL 24 HOURS TRANSDERMAL
Qty: 42 PATCH | Refills: 0 | Status: SHIPPED | OUTPATIENT
Start: 2019-01-03 | End: 2019-10-01

## 2019-01-03 RX ORDER — PAROXETINE HYDROCHLORIDE 40 MG/1
40 TABLET, FILM COATED ORAL EVERY EVENING
Qty: 90 TAB | Refills: 0 | Status: SHIPPED | OUTPATIENT
Start: 2019-01-03 | End: 2019-05-07 | Stop reason: SDUPTHER

## 2019-01-03 RX ORDER — POLYETHYLENE GLYCOL-3350 AND ELECTROLYTES 236; 6.74; 5.86; 2.97; 22.74 G/274.31G; G/274.31G; G/274.31G; G/274.31G; G/274.31G
POWDER, FOR SOLUTION ORAL
Refills: 0 | COMMUNITY
Start: 2018-11-12 | End: 2019-01-03

## 2019-01-03 ASSESSMENT — PATIENT HEALTH QUESTIONNAIRE - PHQ9
SUM OF ALL RESPONSES TO PHQ QUESTIONS 1-9: 6
7. TROUBLE CONCENTRATING ON THINGS, SUCH AS READING THE NEWSPAPER OR WATCHING TELEVISION: NOT AT ALL
6. FEELING BAD ABOUT YOURSELF - OR THAT YOU ARE A FAILURE OR HAVE LET YOURSELF OR YOUR FAMILY DOWN: NOT AL ALL
8. MOVING OR SPEAKING SO SLOWLY THAT OTHER PEOPLE COULD HAVE NOTICED. OR THE OPPOSITE, BEING SO FIGETY OR RESTLESS THAT YOU HAVE BEEN MOVING AROUND A LOT MORE THAN USUAL: NOT AT ALL
3. TROUBLE FALLING OR STAYING ASLEEP OR SLEEPING TOO MUCH: NEARLY EVERY DAY
4. FEELING TIRED OR HAVING LITTLE ENERGY: NOT AT ALL
9. THOUGHTS THAT YOU WOULD BE BETTER OFF DEAD, OR OF HURTING YOURSELF: NOT AT ALL
1. LITTLE INTEREST OR PLEASURE IN DOING THINGS: NOT AT ALL
5. POOR APPETITE OR OVEREATING: MORE THAN HALF THE DAYS
SUM OF ALL RESPONSES TO PHQ9 QUESTIONS 1 AND 2: 1
2. FEELING DOWN, DEPRESSED, IRRITABLE, OR HOPELESS: SEVERAL DAYS

## 2019-01-03 NOTE — ASSESSMENT & PLAN NOTE
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 Has also had a PE. Last saw Dr. Skinner in hematology 12/12/2018 and was advised direct OAC but due to cost she remains on coumadin. She is working on getting onto xarelto with co-pay assistance.

## 2019-01-03 NOTE — ASSESSMENT & PLAN NOTE
Down to 4 cigarettes a day. Would like to quit completely but presently doesn't have the transportation to get to meetings. Was on Wellbutrin but once she stopped the Wellbutrin she went back to smoking. She is open to nicotine replacement therapy.

## 2019-01-03 NOTE — ASSESSMENT & PLAN NOTE
Lab Results   Component Value Date/Time    CHOLSTRLTOT 193 05/19/2018 11:42 AM     (H) 05/19/2018 11:42 AM    HDL 43 05/19/2018 11:42 AM    TRIGLYCERIDE 157 (H) 05/19/2018 11:42 AM

## 2019-01-03 NOTE — ASSESSMENT & PLAN NOTE
Following with Dr. Magana, on Tysabri. Has baclofen pump for spasticity.    Short term refill sent  Prescription approved per [facility] Refill Protocol.  Nicki Donovan, RN, BSN

## 2019-01-03 NOTE — TELEPHONE ENCOUNTER
Future Appointments       Provider Department Center    1/3/2019 10:55 AM Edu Silver M.D. [facility]    6/12/2019 2:20 PM Chris Vidal M.D. [facility]         ESTABLISHED PATIENT PRE-VISIT PLANNING     Patient was NOT contacted to complete PVP.       1.  Reviewed notes from the last few office visits within the medical group: Yes    2.  If any orders were placed at last visit or intended to be done for this visit (i.e. 6 mos follow-up), do we have Results/Consult Notes?        •  Labs - Labs ordered, completed on 11/12/2018 and results are in chart.       •  Imaging - Imaging was not ordered at last office visit.       •  Referrals - No referrals were ordered at last office visit.    3. Is this appointment scheduled as a Hospital Follow-Up? No    4.  Immunizations were updated in [employer] using WebIZ?: Yes       •  Web Iz Recommendations: MMR , TD and SHINGRIX (Shingles)    5.  Patient is due for the following Health Maintenance Topics:   Health Maintenance Due   Topic Date Due   • Annual Wellness Visit  1965   • IMM PNEUMOCOCCAL 19-64 (ADULT) MEDIUM RISK SERIES (1 of 1 - PPSV23) 07/31/1984   • MAMMOGRAM  10/14/2011   • IMM ZOSTER VACCINES (1 of 2) 07/31/2015     6.  MDX printed for Provider? YES    7.  Patient was NOT informed to arrive 15 min prior to their scheduled appointment and bring in their medication bottles.

## 2019-01-03 NOTE — PROGRESS NOTES
PRIMARY CARE CLINIC NEW PATIENT H&P  Chief Complaint   Patient presents with   • Hysterectomy     Questions- 11/15/18     History of Present Illness     MS (multiple sclerosis) ([facility])  Following with Dr. Magana, on Tysabri. Has baclofen pump for spasticity.     Protein S deficiency ([facility])  Has had 11 blood clots, all her in left leg or pelvic area. Has also had a PE. Last saw Dr. Skinner in hematology 12/12/2018 and was advised direct OAC but due to cost she remains on coumadin. She is working on getting onto xarelto with co-pay assistance.     Major depressive disorder with single episode, in full remission ([facility])  Has been stable on paxil at 40 mg for some time.     Smoker  Down to 4 cigarettes a day. Would like to quit completely but presently doesn't have the transportation to get to meetings. Was on Wellbutrin but once she stopped the Wellbutrin she went back to smoking. She is open to nicotine replacement therapy.     Abdominal pain  Has been having intermittent lower abdominal craming since her hysterectomy 11/2018. Cramping will last about 30 seconds.     Indigestion  Takes tums as needed.     Hyperlipidemia  Lab Results   Component Value Date/Time    CHOLSTRLTOT 193 05/19/2018 11:42 AM     (H) 05/19/2018 11:42 AM    HDL 43 05/19/2018 11:42 AM    TRIGLYCERIDE 157 (H) 05/19/2018 11:42 AM         Current Outpatient Prescriptions   Medication Sig Dispense Refill   • paroxetine (PAXIL) 40 MG tablet Take 1 Tab by mouth every evening. 90 Tab 0   • Rivaroxaban (XARELTO STARTER PACK) 15 & 20 MG Tablet Therapy Pack Take 20 mg by mouth every day. 30 Each 5   • magnesium oxide (MAG-OX) 400 (241.3 Mg) MG Tab tablet Take 1 Tab by mouth every day. 30 Tab 2   • diphenhydrAMINE (BENADRYL) 25 MG Tab Take 25 mg by mouth at bedtime as needed for Sleep.     • calcium carbonate (TUMS) 500 MG Chew Tab Take 500 mg by mouth every day.     • fluticasone (FLONASE) 50 MCG/ACT nasal spray Spray 1 Spray in nose every day.     • Pain  Pump (PATIENT SUPPLIED) XX LEXI 1 Each by Injection route Continuous. Baclofen 20MCG daily  Bolas 10MCG/ 250MCG per ML  Pt last filled on 10/2018     • docosahexanoic acid (FISH OIL) 1000 MG CAPS Take 1,000 mg by mouth every evening.     • Natalizumab (TYSABRI IV) 1 Dose by Intravenous route Q 4 Weeks.     • vitamin D (CHOLECALCIFEROL) 1000 UNIT TABS Take 1,000 Units by mouth 2 Times a Day.       No current facility-administered medications for this visit.      Past Medical History:   Diagnosis Date   • Anesthesia     PONV   • Hyperlipidemia 1/3/2019   • Indigestion 1/3/2019   • Major depressive disorder with single episode, in full remission (HCC) 5/17/2018   • MS (multiple sclerosis) ([facility])    • Protein S deficiency ([facility])     on coumadin   • Smoker 1/3/2019     Past Surgical History:   Procedure Laterality Date   • HYSTERECTOMY ROBOTIC XI N/A 11/15/2018    Procedure: HYSTERECTOMY ROBOTIC XI;  Surgeon: Santo Watts M.D.;  Location: SURGERY [facility];  Service: Gynecology   • SALPINGECTOMY Bilateral 11/15/2018    Procedure: SALPINGECTOMY;  Surgeon: Santo Watts M.D.;  Location: SURGERY [facility];  Service: Gynecology   • OOPHORECTOMY Bilateral 11/15/2018    Procedure: OOPHORECTOMY;  Surgeon: Santo Watts M.D.;  Location: SURGERY [facility];  Service: Gynecology   • OTHER  2016    IVC filter taken out   • PUMP INSERT/REMOVE  10/19/2010    Performed by SOMMER PAREDES at SURGERY [facility]   • PUMP INSERT/REMOVE  8/24/2010    Performed by SOMMER PAREDES at SURGERY [facility]   • OTHER  2010    IVC filter insertion     Social History   Substance Use Topics   • Smoking status: Current Every Day Smoker     Packs/day: 0.50     Years: 40.00     Types: Cigarettes   • Smokeless tobacco: Never Used      Comment: 4 cigs/day, trying to quit   • Alcohol use No     Social History     Social History Narrative    Used to work for [employer], not working presently      Family History   Problem  "Relation Age of Onset   • Hypertension Unknown    • No Known Problems Mother    • Lung Disease Father         emphysema   • No Known Problems Sister    • No Known Problems Child    • Cancer Neg Hx    • Diabetes Neg Hx      Family Status   Relation Status   • Unknown (Not Specified)   • Mo Alive   • Fa    • Sis Alive   • Child Alive   • Neg Hx (Not Specified)     Allergies: Nubain [nalbuphine hcl]    ROS  Constitutional: Negative for fatigue/generalized weakness.   HEENT: Negative for  vision changes, hearing changes    Respiratory: Negative for shortness of breath  Cardiovascular: Negative for chest pain, palpitations  Gastrointestinal: Negative for blood in stool, constipation, diarrhea  Genitourinary: Negative for dysuria, polyuria  Musculoskeletal: Negative for myalgias, back pain, and joint pain.   Skin: Negative for rash  Neurological: Negative for numbness, tingling  Psychiatric/Behavioral: Negative for depression, anxiety       Objective   Blood pressure 134/78, pulse 81, temperature 36.3 °C (97.3 °F), resp. rate 18, height 1.753 m (5' 9\"), weight 97.1 kg (214 lb), last menstrual period 11/10/2018, SpO2 98 %, not currently breastfeeding. Body mass index is 31.6 kg/m².    General: Alert, oriented. In no acute distress   HEET: EOMI, PERRL, conjunctiva non-injected, sclera non-icteric.  Nares patent with no significant congestion or drainage.  Jaelyn pinnae, external auditory canals, TM pearly gray with normal light reflex bilaterally.Oral mucous membranes pink and moist with no lesions.  Neck: supple with no cervical, subclavicular lymphadenopathy, JVD, palpable thyroid nodules   Lungs: clear to auscultation bilaterally with good excursion.  CV: regular rate and rhythm.  Abdomen soft, non-distended, non-tender with normal bowel sounds. No hepatosplenomegaly, no masses palpated  Skin: no lesions. Warm, dry   Psychiatric: appropriate mood and affect     Assessment and Plan   The following treatment plan " was discussed     1. MS (multiple sclerosis) (HCC)  On infusion, following with Dr. Magana in neurology. Has baclofen pump for spasticity.   - COMP METABOLIC PANEL; Future  - CBC WITH DIFFERENTIAL; Future    2. Protein S deficiency (HCC)  On coumadin, working for payment assistance to switch to xarelto. Following with hematology and sees them next 6/2019.     3. Major depressive disorder with single episode, in full remission (HCC)  Stable, controlled.   - paroxetine (PAXIL) 40 MG tablet; Take 1 Tab by mouth every evening.  Dispense: 90 Tab; Refill: 0    4. Smoker  Discussed nicotine replacement therapy, she is open to trying nicotine patches.     5. Breast cancer screening  - MA-SCREENING MAMMO BILAT W/TOMOSYNTHESIS W/CAD; Future    6. Indigestion  Takes tums as needed.     7. Hyperlipidemia, unspecified hyperlipidemia type  - Lipid Profile; Future      Return in about 6 months (around 7/3/2019).    Health Maintenance      Health Maintenance Due   Topic Date Due   • Annual Wellness Visit  1965   • IMM PNEUMOCOCCAL 19-64 (ADULT) MEDIUM RISK SERIES (1 of 1 - PPSV23) 07/31/1984   • MAMMOGRAM  10/14/2011       Edu Silver MD  Internal Medicine  [facility]

## 2019-01-03 NOTE — ASSESSMENT & PLAN NOTE
Has been having intermittent lower abdominal craming since her hysterectomy 11/2018. Cramping will last about 30 seconds.

## 2019-01-09 LAB — INR PPP: 3.8 (ref 2–3.5)

## 2019-01-11 ENCOUNTER — ANTICOAGULATION MONITORING (OUTPATIENT)
Dept: VASCULAR LAB | Facility: MEDICAL CENTER | Age: 54
End: 2019-01-11

## 2019-01-11 DIAGNOSIS — D68.59 PROTEIN S DEFICIENCY (HCC): ICD-10-CM

## 2019-01-12 ENCOUNTER — HOSPITAL ENCOUNTER (OUTPATIENT)
Dept: LAB | Facility: MEDICAL CENTER | Age: 54
End: 2019-01-12
Attending: INTERNAL MEDICINE
Payer: MEDICARE

## 2019-01-12 DIAGNOSIS — E78.5 HYPERLIPIDEMIA, UNSPECIFIED HYPERLIPIDEMIA TYPE: ICD-10-CM

## 2019-01-12 DIAGNOSIS — G35 MS (MULTIPLE SCLEROSIS) (HCC): ICD-10-CM

## 2019-01-12 LAB
ALBUMIN SERPL BCP-MCNC: 3.9 G/DL (ref 3.2–4.9)
ALBUMIN/GLOB SERPL: 1.3 G/DL
ALP SERPL-CCNC: 86 U/L (ref 30–99)
ALT SERPL-CCNC: 10 U/L (ref 2–50)
ANION GAP SERPL CALC-SCNC: 6 MMOL/L (ref 0–11.9)
AST SERPL-CCNC: 16 U/L (ref 12–45)
BASOPHILS # BLD AUTO: 0.8 % (ref 0–1.8)
BASOPHILS # BLD: 0.08 K/UL (ref 0–0.12)
BILIRUB SERPL-MCNC: 0.3 MG/DL (ref 0.1–1.5)
BUN SERPL-MCNC: 16 MG/DL (ref 8–22)
CALCIUM SERPL-MCNC: 9.4 MG/DL (ref 8.5–10.5)
CHLORIDE SERPL-SCNC: 110 MMOL/L (ref 96–112)
CHOLEST SERPL-MCNC: 217 MG/DL (ref 100–199)
CO2 SERPL-SCNC: 26 MMOL/L (ref 20–33)
CREAT SERPL-MCNC: 1.08 MG/DL (ref 0.5–1.4)
EOSINOPHIL # BLD AUTO: 0.29 K/UL (ref 0–0.51)
EOSINOPHIL NFR BLD: 2.8 % (ref 0–6.9)
ERYTHROCYTE [DISTWIDTH] IN BLOOD BY AUTOMATED COUNT: 48.1 FL (ref 35.9–50)
FASTING STATUS PATIENT QL REPORTED: NORMAL
GLOBULIN SER CALC-MCNC: 3.1 G/DL (ref 1.9–3.5)
GLUCOSE SERPL-MCNC: 89 MG/DL (ref 65–99)
HCT VFR BLD AUTO: 42.6 % (ref 37–47)
HDLC SERPL-MCNC: 35 MG/DL
HGB BLD-MCNC: 14.4 G/DL (ref 12–16)
IMM GRANULOCYTES # BLD AUTO: 0.05 K/UL (ref 0–0.11)
IMM GRANULOCYTES NFR BLD AUTO: 0.5 % (ref 0–0.9)
LDLC SERPL CALC-MCNC: 135 MG/DL
LYMPHOCYTES # BLD AUTO: 4.81 K/UL (ref 1–4.8)
LYMPHOCYTES NFR BLD: 46.9 % (ref 22–41)
MCH RBC QN AUTO: 31.3 PG (ref 27–33)
MCHC RBC AUTO-ENTMCNC: 33.8 G/DL (ref 33.6–35)
MCV RBC AUTO: 92.6 FL (ref 81.4–97.8)
MONOCYTES # BLD AUTO: 0.8 K/UL (ref 0–0.85)
MONOCYTES NFR BLD AUTO: 7.8 % (ref 0–13.4)
NEUTROPHILS # BLD AUTO: 4.22 K/UL (ref 2–7.15)
NEUTROPHILS NFR BLD: 41.2 % (ref 44–72)
NRBC # BLD AUTO: 0 K/UL
NRBC BLD-RTO: 0 /100 WBC
PLATELET # BLD AUTO: 303 K/UL (ref 164–446)
PMV BLD AUTO: 11.1 FL (ref 9–12.9)
POTASSIUM SERPL-SCNC: 4.6 MMOL/L (ref 3.6–5.5)
PROT SERPL-MCNC: 7 G/DL (ref 6–8.2)
RBC # BLD AUTO: 4.6 M/UL (ref 4.2–5.4)
SODIUM SERPL-SCNC: 142 MMOL/L (ref 135–145)
TRIGL SERPL-MCNC: 234 MG/DL (ref 0–149)
WBC # BLD AUTO: 10.3 K/UL (ref 4.8–10.8)

## 2019-01-12 PROCEDURE — 85025 COMPLETE CBC W/AUTO DIFF WBC: CPT

## 2019-01-12 PROCEDURE — 36415 COLL VENOUS BLD VENIPUNCTURE: CPT

## 2019-01-12 PROCEDURE — 80061 LIPID PANEL: CPT

## 2019-01-12 PROCEDURE — 80053 COMPREHEN METABOLIC PANEL: CPT

## 2019-01-12 NOTE — PROGRESS NOTES
Anticoagulation Summary  As of 1/11/2019    INR goal:   3.0-4.0   TTR:   60.5 % (4.7 mo)   Today's INR:   3.8 (1/9/2019)   Warfarin maintenance plan:   5 mg (5 mg x 1) on Tue, Thu, Sat; 2.5 mg (5 mg x 0.5) all other days   Weekly warfarin total:   25 mg   Plan last modified:   Bea Mabry, PharmD (10/11/2018)   Next INR check:   2/8/2019   Target end date:   Indefinite    Indications    Protein S deficiency (HCC) [D68.59]             Anticoagulation Episode Summary     INR check location:       Preferred lab:       Send INR reminders to:       Comments:   Francesco      Anticoagulation Care Providers     Provider Role Specialty Phone number    Elana Gillespie D.O. Referring Family Medicine [phone]    [facility] Anticoagulation Services Responsible  [phone]        Anticoagulation Patient Findings  Patient Findings     Negatives:   Signs/symptoms of thrombosis, Signs/symptoms of bleeding, Laboratory test error suspected, Change in health, Change in alcohol use, Change in activity, Upcoming invasive procedure, Emergency department visit, Upcoming dental procedure, Missed doses, Extra doses, Change in medications, Change in diet/appetite, Hospital admission, Bruising, Other complaints        Spoke with the patient on the phone today, reporting a SUPRA-therapeutic INR of 3.8.  Confirmed the current warfarin dosing regimen and patient compliance. Patient denies any interval changes to diet and/or medications. Patient denies any signs/symptoms of bleeding or clotting.  Patient instructed to continue with her current dosing regimen. Patient reported that she & her physician discussed her switching to a DOAC, and she is trying to see what insurance will cover first and will stay on warfarin until she knows for sure she can afford it. Patient will follow up again in 4 weeks and will contact us if she switches to a DOAC before then.    Tito ZavalaD

## 2019-01-22 ENCOUNTER — ANTICOAGULATION MONITORING (OUTPATIENT)
Dept: MEDICAL GROUP | Facility: MEDICAL CENTER | Age: 54
End: 2019-01-22

## 2019-01-22 DIAGNOSIS — D68.59 PROTEIN S DEFICIENCY (HCC): ICD-10-CM

## 2019-01-22 LAB — INR PPP: 3.2 (ref 2–3.5)

## 2019-01-22 NOTE — PROGRESS NOTES
Anticoagulation Summary  As of 1/22/2019    INR goal:   3.0-4.0   TTR:   63.9 % (5.1 mo)   Today's INR:   3.2   Warfarin maintenance plan:   5 mg (5 mg x 1) on Tue, Thu, Sat; 2.5 mg (5 mg x 0.5) all other days   Weekly warfarin total:   25 mg   Plan last modified:   Bea Mabry, PharmD (10/11/2018)   Next INR check:   2/5/2019   Target end date:   Indefinite    Indications    Protein S deficiency (HCC) [D68.59]             Anticoagulation Episode Summary     INR check location:       Preferred lab:       Send INR reminders to:       Comments:   Alere      Anticoagulation Care Providers     Provider Role Specialty Phone number    Elana Gillespie D.O. Referring Family Medicine [phone]    [facility] Anticoagulation Services Responsible  [phone]        Anticoagulation Patient Findings      Spoke with patient to report a therapeutic INR.    Pt instructed to continue with current warfarin dosing regimen, confirms dosing.   Pt denies any s/s of bleeding, bruising, clotting or any changes to diet or medication.    Will follow up in 2 week(s).     Lars White, Pharmacy Intern

## 2019-02-06 ENCOUNTER — ANTICOAGULATION MONITORING (OUTPATIENT)
Dept: VASCULAR LAB | Facility: MEDICAL CENTER | Age: 54
End: 2019-02-06

## 2019-02-06 DIAGNOSIS — D68.59 PROTEIN S DEFICIENCY (HCC): ICD-10-CM

## 2019-02-06 LAB — INR PPP: 3.7 (ref 2–3.5)

## 2019-02-06 NOTE — PROGRESS NOTES
Anticoagulation Summary  As of 2/6/2019    INR goal:   3.0-4.0   TTR:   67.1 % (5.6 mo)   INR used for dosing:   3.7 (2/6/2019)   Warfarin maintenance plan:   5 mg (5 mg x 1) every Tue, Thu, Sat; 2.5 mg (5 mg x 0.5) all other days   Weekly warfarin total:   25 mg   Plan last modified:   Bea Mabry, PharmD (10/11/2018)   Next INR check:   2/20/2019   Target end date:   Indefinite    Indications    Protein S deficiency (HCC) [D68.59]             Anticoagulation Episode Summary     INR check location:       Preferred lab:       Send INR reminders to:       Comments:   Francesco      Anticoagulation Care Providers     Provider Role Specialty Phone number    Elana Gillespie D.O. Referring Family Medicine [phone]    [facility] Anticoagulation Services Responsible  [phone]        Anticoagulation Patient Findings      Spoke with patient to report a therapeutic INR.    Pt instructed to continue with current warfarin dosing regimen, confirms dosing.   Pt denies any s/s of bleeding, bruising, clotting or any changes to diet or medication.    Will follow up in 2 week(s).     Lars White, Pharmacy Intern

## 2019-02-13 ENCOUNTER — ANTICOAGULATION MONITORING (OUTPATIENT)
Dept: VASCULAR LAB | Facility: MEDICAL CENTER | Age: 54
End: 2019-02-13

## 2019-02-13 DIAGNOSIS — D68.59 PROTEIN S DEFICIENCY (HCC): ICD-10-CM

## 2019-02-13 NOTE — PROGRESS NOTES
Anticoagulation Summary  As of 2/13/2019    INR goal:      TTR:   67.1 % (5.6 mo)   Prior goal:   3.0-4.0   INR used for dosing:   No new INR was available at the time of this encounter.   Warfarin maintenance plan:   No maintenance plan   Plan last modified:   Bea Mabry, PharmD (2/13/2019)   Next INR check:   8/13/2019   Target end date:   Indefinite    Indications    Protein S deficiency (HCC) [D68.59]             Anticoagulation Episode Summary     INR check location:       Preferred lab:       Send INR reminders to:       Comments:   Xarelto per hematology      Anticoagulation Care Providers     Provider Role Specialty Phone number    Elana Gillespie D.O. Referring Family Medicine [phone]    [facility] Anticoagulation Services Responsible  [phone]        Anticoagulation Patient Findings      Spoke with patient.  She stopped warfarin on Monday, and started Xarelto 20mg last night, as per Dr. Skinner.   Went over the s/s bleeding with pt, drug interactions, monitoring.   Pt agrees to repeat labs in 6 months if not sooner per Hematology.    Bea Mabry, PharmD

## 2019-02-14 ENCOUNTER — TELEPHONE (OUTPATIENT)
Dept: HEMATOLOGY ONCOLOGY | Facility: MEDICAL CENTER | Age: 54
End: 2019-02-14

## 2019-02-14 NOTE — TELEPHONE ENCOUNTER
"Patient states she started her Xarelto on February 12th. She states her dizzy spells are worse and she has begun to spot blood today. She informs me she had a hysterectomy in November 2018.   Patient states her vagina is slightly painful. More \"irritating\".  Patient denies fever. She states she is having hot flashes.     I informed her I will relay the message to one of our nurses and they will return her call.   "

## 2019-02-17 NOTE — TELEPHONE ENCOUNTER
RN called patient in regards to reports of vaginal bleeding. Patient reports this started on Friday and it was only present on toilet paper after having a BM. RN questioned patient about hx hemorroids. Patient does report a hx, but states the bleeding is coming from the vaginal area. Patient stated again it happens with BMs and only spotting temporarily.    Patient states as far as the faintness it is much better. RN provided education r/t complication with Xarleto and when to seek emergency assistance. RN stated she would follow up with on of the providers in regards to any interventions are needed and will contact her again on Tuesday when the office opens. Patient verbalized understanding at this time.

## 2019-02-19 NOTE — TELEPHONE ENCOUNTER
I called and spoke with pt.  She denies bleeding since Sunday.  She also denies N/V/D or dizziness.  She states that she has had several bowel movements since then, with no blood.  She will see Dr. Patino, her OB/GYN, rather than Dr. Watts.  I told her to make the appt with Dr. Patino sooner than later.  She verbalized understanding.

## 2019-03-07 ENCOUNTER — HOSPITAL ENCOUNTER (OUTPATIENT)
Dept: RADIOLOGY | Facility: MEDICAL CENTER | Age: 54
End: 2019-03-07
Attending: SPECIALIST
Payer: MEDICARE

## 2019-03-07 ENCOUNTER — HOSPITAL ENCOUNTER (OUTPATIENT)
Dept: RADIOLOGY | Facility: MEDICAL CENTER | Age: 54
End: 2019-03-07
Attending: INTERNAL MEDICINE
Payer: MEDICARE

## 2019-03-07 DIAGNOSIS — M89.9 BONE DISEASE: ICD-10-CM

## 2019-03-07 DIAGNOSIS — Z12.39 BREAST CANCER SCREENING: ICD-10-CM

## 2019-03-07 PROCEDURE — 77063 BREAST TOMOSYNTHESIS BI: CPT

## 2019-03-07 PROCEDURE — 77080 DXA BONE DENSITY AXIAL: CPT

## 2019-03-11 ENCOUNTER — HOSPITAL ENCOUNTER (OUTPATIENT)
Dept: RADIOLOGY | Facility: MEDICAL CENTER | Age: 54
End: 2019-03-11

## 2019-03-24 ENCOUNTER — HOSPITAL ENCOUNTER (OUTPATIENT)
Dept: RADIOLOGY | Facility: MEDICAL CENTER | Age: 54
End: 2019-03-24
Attending: OBSTETRICS & GYNECOLOGY
Payer: MEDICARE

## 2019-03-24 DIAGNOSIS — R10.9 STOMACH ACHE: ICD-10-CM

## 2019-03-24 PROCEDURE — 700117 HCHG RX CONTRAST REV CODE 255: Performed by: OBSTETRICS & GYNECOLOGY

## 2019-03-24 PROCEDURE — 74177 CT ABD & PELVIS W/CONTRAST: CPT

## 2019-03-24 RX ADMIN — IOHEXOL 100 ML: 350 INJECTION, SOLUTION INTRAVENOUS at 15:14

## 2019-03-24 RX ADMIN — IOHEXOL 50 ML: 240 INJECTION, SOLUTION INTRATHECAL; INTRAVASCULAR; INTRAVENOUS; ORAL at 13:40

## 2019-05-07 DIAGNOSIS — F32.5 MAJOR DEPRESSIVE DISORDER WITH SINGLE EPISODE, IN FULL REMISSION (HCC): ICD-10-CM

## 2019-05-08 ENCOUNTER — TELEPHONE (OUTPATIENT)
Dept: HEMATOLOGY ONCOLOGY | Facility: MEDICAL CENTER | Age: 54
End: 2019-05-08

## 2019-05-08 RX ORDER — PAROXETINE HYDROCHLORIDE 40 MG/1
TABLET, FILM COATED ORAL
Qty: 90 TAB | Refills: 0 | Status: SHIPPED | OUTPATIENT
Start: 2019-05-08 | End: 2019-08-08 | Stop reason: SDUPTHER

## 2019-05-08 NOTE — TELEPHONE ENCOUNTER
Called the patient in follow up to letter sent dated 3/25/19 informing patient that Dr. Vidal is leaving [facility].  No answer, left voicemail for patient requesting a return call.

## 2019-05-09 DIAGNOSIS — F32.5 MAJOR DEPRESSIVE DISORDER WITH SINGLE EPISODE, IN FULL REMISSION (HCC): ICD-10-CM

## 2019-05-10 RX ORDER — PAROXETINE HYDROCHLORIDE 40 MG/1
TABLET, FILM COATED ORAL
Qty: 90 TAB | Refills: 0 | OUTPATIENT
Start: 2019-05-10

## 2019-05-10 NOTE — TELEPHONE ENCOUNTER
Was the patient seen in the last year in this department? Yes    Does patient have an active prescription for medications requested? Yes    Received Request Via: Pharmacy      Pt met protocol?: Yes   Pt last ov 1/19  Patient has Insurance handled by [employer] please consider sending a 100 day supply if appropriate.

## 2019-06-12 ENCOUNTER — OFFICE VISIT (OUTPATIENT)
Dept: HEMATOLOGY ONCOLOGY | Facility: MEDICAL CENTER | Age: 54
End: 2019-06-12
Payer: MEDICARE

## 2019-06-12 VITALS
SYSTOLIC BLOOD PRESSURE: 118 MMHG | DIASTOLIC BLOOD PRESSURE: 68 MMHG | OXYGEN SATURATION: 96 % | HEART RATE: 80 BPM | BODY MASS INDEX: 32.77 KG/M2 | TEMPERATURE: 97.9 F | RESPIRATION RATE: 16 BRPM | WEIGHT: 221.89 LBS

## 2019-06-12 DIAGNOSIS — D68.59 PROTEIN S DEFICIENCY (HCC): ICD-10-CM

## 2019-06-12 PROCEDURE — 99213 OFFICE O/P EST LOW 20 MIN: CPT | Performed by: INTERNAL MEDICINE

## 2019-06-12 ASSESSMENT — PAIN SCALES - GENERAL: PAINLEVEL: NO PAIN

## 2019-06-12 NOTE — PROGRESS NOTES
06/12/19    Subjective    Chief Complaint:  F/u protein S deficiency    HPI:  Patient with hx of clotting disorder dx'd at age 16 with Protein S deficiency. Was on coumadin for years, now on Xarelto x 4 months which she is tolerating well. Her other problem is MS. She is on a Baclofen pain pump that delivers into the spinal canal. She has a h/o depression that is well control with Paxil.    ROS: no systemic c/o's No cardiac, pulmonay, GI sx's.    PMH:    Allergies   Allergen Reactions   • Nubain [Nalbuphine Hcl]      Seizures         Medications:    Current Outpatient Prescriptions on File Prior to Visit   Medication Sig Dispense Refill   • paroxetine (PAXIL) 40 MG tablet TAKE 1 TABLET BY MOUTH EVERY DAY IN THE EVENING 90 Tab 0   • nicotine (NICODERM) 14 MG/24HR PATCH 24 HR Use for first 6 weeks 42 Patch 0   • nicotine (NICODERM) 7 MG/24HR PATCH 24 HR Use for 2 weeks after completing the 14 mg patch 14 Patch 0   • Rivaroxaban (XARELTO STARTER PACK) 15 & 20 MG Tablet Therapy Pack Take 20 mg by mouth every day. 30 Each 5   • magnesium oxide (MAG-OX) 400 (241.3 Mg) MG Tab tablet Take 1 Tab by mouth every day. 30 Tab 2   • calcium carbonate (TUMS) 500 MG Chew Tab Take 500 mg by mouth every day.     • fluticasone (FLONASE) 50 MCG/ACT nasal spray Spray 1 Spray in nose every day.     • Pain Pump (PATIENT SUPPLIED) XX LEXI 1 Each by Injection route Continuous. Baclofen 20MCG daily  Bolas 10MCG/ 250MCG per ML  Pt last filled on 10/2018     • docosahexanoic acid (FISH OIL) 1000 MG CAPS Take 1,000 mg by mouth every evening.     • Natalizumab (TYSABRI IV) 1 Dose by Intravenous route Q 4 Weeks.     • vitamin D (CHOLECALCIFEROL) 1000 UNIT TABS Take 1,000 Units by mouth 2 Times a Day.     • diphenhydrAMINE (BENADRYL) 25 MG Tab Take 25 mg by mouth at bedtime as needed for Sleep.       No current facility-administered medications on file prior to visit.          Objective    Vitals:    /68 (BP Location: Right arm, Patient  Position: Sitting, BP Cuff Size: Adult)   Pulse 80   Temp 36.6 °C (97.9 °F) (Temporal)   Resp 16   Wt 100.7 kg (221 lb 14.3 oz)   LMP 11/10/2018   SpO2 96%   BMI 32.77 kg/m²     Physical Exam: Cheerful, WD WN female NAD    HEENT - PERRL  Neck - supple  Node - none  Chest - clear  Breasts - No mass  COR - RSR no murmur  Abd - soft. No organomegaly. Pump easily palpable in RLQ  Ext - no edema  Neuro - DTR's 1+=. No focal signs    Labs:  No recent    Assessment    Imp:    Visit Diagnosis:    1. Protein S deficiency (HCC)           Plan:  Continue Xeralto   . Needs lifelong anticoagulation.  Discussed differences between coumadin and direct Xa inhibitors.   RTC prn    Philipp Cortes M.D.

## 2019-06-26 ENCOUNTER — TELEPHONE (OUTPATIENT)
Dept: MEDICAL GROUP | Facility: PHYSICIAN GROUP | Age: 54
End: 2019-06-26

## 2019-06-26 NOTE — TELEPHONE ENCOUNTER
Future Appointments       Provider Department Center    7/3/2019 3:35 PM Edu Silver M.D. [facility]        ESTABLISHED PATIENT PRE-VISIT PLANNING     Patient was NOT contacted to complete PVP.      1.  Reviewed notes from the last few office visits within the medical group: Yes    2.  If any orders were placed at last visit or intended to be done for this visit (i.e. 6 mos follow-up), do we have Results/Consult Notes?         •  Labs - Labs ordered, completed on 01/12/2019 and results are in chart.       •  Imaging - Imaging was not ordered at last office visit.       •  Referrals - No referrals were ordered at last office visit.    3. Is this appointment scheduled as a Hospital Follow-Up? No    4.  Immunizations were updated in [employer] using WebIZ?: Yes       •  Web Iz Recommendations: MMR , TD, VARICELLA (Chicken Pox)  and SHINGRIX (Shingles)    5.  Patient is due for the following Health Maintenance Topics:   Health Maintenance Due   Topic Date Due   • Annual Wellness Visit  1965   • HEPATITIS C SCREENING  1965   • IMM PNEUMOCOCCAL 19-64 (ADULT) MEDIUM RISK SERIES (1 of 1 - PPSV23) 07/31/1984   • IMM ZOSTER VACCINES (1 of 2) 07/31/2015     6. Orders for overdue Health Maintenance topics pended in Pre-Charting? NO    7.  AHA (MDX) form printed for Provider? No, already completed    8.  Patient was NOT informed to arrive 15 min prior to their scheduled appointment and bring in their medication bottles.

## 2019-07-03 ENCOUNTER — OFFICE VISIT (OUTPATIENT)
Dept: MEDICAL GROUP | Facility: PHYSICIAN GROUP | Age: 54
End: 2019-07-03
Payer: MEDICARE

## 2019-07-03 VITALS
HEIGHT: 69 IN | DIASTOLIC BLOOD PRESSURE: 84 MMHG | TEMPERATURE: 97.7 F | HEART RATE: 68 BPM | RESPIRATION RATE: 16 BRPM | SYSTOLIC BLOOD PRESSURE: 124 MMHG | OXYGEN SATURATION: 97 % | BODY MASS INDEX: 32.44 KG/M2 | WEIGHT: 219 LBS

## 2019-07-03 DIAGNOSIS — D68.59 PROTEIN S DEFICIENCY (HCC): ICD-10-CM

## 2019-07-03 DIAGNOSIS — E78.5 HYPERLIPIDEMIA, UNSPECIFIED HYPERLIPIDEMIA TYPE: ICD-10-CM

## 2019-07-03 DIAGNOSIS — E89.40 POST HYSTERECTOMY MENOPAUSE: ICD-10-CM

## 2019-07-03 DIAGNOSIS — Z90.710 POST HYSTERECTOMY MENOPAUSE: ICD-10-CM

## 2019-07-03 PROCEDURE — 99213 OFFICE O/P EST LOW 20 MIN: CPT | Performed by: INTERNAL MEDICINE

## 2019-07-03 NOTE — ASSESSMENT & PLAN NOTE
Since her hysterectomy 11/2018 she has been having vaginal dryness, and pressure with intercourse. Also having hot flashes and fatigue since. She has been using a silicone lubricant during intercourse.

## 2019-07-03 NOTE — PROGRESS NOTES
PRIMARY CARE CLINIC FOLLOW UP VISIT  Chief Complaint   Patient presents with   • Hyperlipidemia   • Depression   • Multiple Sclerosis   • Other     Xarelto Refill      History of Present Illness     Protein S deficiency (HCC)  Her hematologist has advised her that she is to remain on anticoagulation lifelong and has advised follow up with PCP for further management.     Post hysterectomy menopause  Since her hysterectomy 11/2018 she has been having vaginal dryness, and pressure with intercourse. Also having hot flashes and fatigue since. She has been using a silicone lubricant during intercourse.     Current Outpatient Prescriptions   Medication Sig Dispense Refill   • rivaroxaban (XARELTO) 20 MG Tab tablet Take 1 Tab by mouth with dinner. 90 Tab 3   • nicotine (NICODERM) 14 MG/24HR PATCH 24 HR Use for first 6 weeks 42 Patch 0   • paroxetine (PAXIL) 40 MG tablet TAKE 1 TABLET BY MOUTH EVERY DAY IN THE EVENING 90 Tab 0   • nicotine (NICODERM) 7 MG/24HR PATCH 24 HR Use for 2 weeks after completing the 14 mg patch 14 Patch 0   • magnesium oxide (MAG-OX) 400 (241.3 Mg) MG Tab tablet Take 1 Tab by mouth every day. 30 Tab 2   • diphenhydrAMINE (BENADRYL) 25 MG Tab Take 25 mg by mouth at bedtime as needed for Sleep.     • calcium carbonate (TUMS) 500 MG Chew Tab Take 500 mg by mouth every day.     • fluticasone (FLONASE) 50 MCG/ACT nasal spray Spray 1 Spray in nose every day.     • Pain Pump (PATIENT SUPPLIED) XX LEXI 1 Each by Injection route Continuous. Baclofen 20MCG daily  Bolas 10MCG/ 250MCG per ML  Pt last filled on 10/2018     • docosahexanoic acid (FISH OIL) 1000 MG CAPS Take 1,000 mg by mouth every evening.     • Natalizumab (TYSABRI IV) 1 Dose by Intravenous route Q 4 Weeks.     • vitamin D (CHOLECALCIFEROL) 1000 UNIT TABS Take 1,000 Units by mouth 2 Times a Day.       No current facility-administered medications for this visit.      Past Medical History:   Diagnosis Date   • Anesthesia     PONV   •  Hyperlipidemia 1/3/2019   • Indigestion 1/3/2019   • Major depressive disorder with single episode, in full remission (HCC) 2018   • MS (multiple sclerosis) ([facility])    • Protein S deficiency (HCC)     on coumadin   • Smoker 1/3/2019     Past Surgical History:   Procedure Laterality Date   • HYSTERECTOMY ROBOTIC XI N/A 11/15/2018    Procedure: HYSTERECTOMY ROBOTIC XI;  Surgeon: Santo Watts M.D.;  Location: SURGERY [facility];  Service: Gynecology   • SALPINGECTOMY Bilateral 11/15/2018    Procedure: SALPINGECTOMY;  Surgeon: Santo Watts M.D.;  Location: SURGERY [facility];  Service: Gynecology   • OOPHORECTOMY Bilateral 11/15/2018    Procedure: OOPHORECTOMY;  Surgeon: Santo Watts M.D.;  Location: SURGERY [facility];  Service: Gynecology   • OTHER      IVC filter taken out   • PUMP INSERT/REMOVE  10/19/2010    Performed by SOMMER PAREDES at SURGERY [facility]   • PUMP INSERT/REMOVE  2010    Performed by SOMMER PAREDES at [facility]   • OTHER      IVC filter insertion     Social History   Substance Use Topics   • Smoking status: Current Every Day Smoker     Packs/day: 0.50     Years: 40.00     Types: Cigarettes   • Smokeless tobacco: Never Used      Comment: 4 cigs/day, trying to quit   • Alcohol use No     Social History     Social History Narrative    Used to work for [employer], not working presently      Family History   Problem Relation Age of Onset   • Hypertension Unknown    • No Known Problems Mother    • Lung Disease Father         emphysema   • No Known Problems Sister    • No Known Problems Child    • Cancer Neg Hx    • Diabetes Neg Hx      Family Status   Relation Status   • Unknown (Not Specified)   • Mo Alive   • Fa    • Sis Alive   • Child Alive   • Neg Hx (Not Specified)     Allergies: Nubain [nalbuphine hcl]    ROS  As per HPI above. All other systems reviewed and negative.        Objective   /84 (BP Cuff Size: Large adult)   " Pulse 68   Temp 36.5 °C (97.7 °F)   Resp 16   Ht 1.753 m (5' 9\")   Wt 99.3 kg (219 lb)   SpO2 97%  Body mass index is 32.34 kg/m².    General: alert and oriented, pleasant, cooperative  HEENT: Normocephalic, atraumatic.   Psychiatric: appropriate mood and affect. Good insight and appropriate judgment     Assessment and Plan   The following treatment plan was discussed     1. Protein S deficiency (HCC)  Per hematology to be on lifelong anticoagulation, continue.   - rivaroxaban (XARELTO) 20 MG Tab tablet; Take 1 Tab by mouth with dinner.  Dispense: 90 Tab; Refill: 3    2. Post hysterectomy menopause  Briefly discussed vaginal estrogen cream but perhaps not a good option given her clotting history. Continue to monitor.     3. Hyperlipidemia, unspecified hyperlipidemia type  - Comp Metabolic Panel; Future  - CBC WITH DIFFERENTIAL; Future  - Lipid Profile; Future      Healthcare maintenance     Health Maintenance Due   Topic Date Due   • Annual Wellness Visit  1965   • HEPATITIS C SCREENING  1965   • IMM PNEUMOCOCCAL 19-64 (ADULT) MEDIUM RISK SERIES (1 of 1 - PPSV23) 07/31/1984       Return in about 6 months (around 1/3/2020).    Edu Silver MD  Internal Medicine  [facility]                   "

## 2019-07-03 NOTE — ASSESSMENT & PLAN NOTE
Her hematologist has advised her that she is to remain on anticoagulation lifelong and has advised follow up with PCP for further management.

## 2019-07-30 ENCOUNTER — PATIENT OUTREACH (OUTPATIENT)
Dept: HEALTH INFORMATION MANAGEMENT | Facility: OTHER | Age: 54
End: 2019-07-30

## 2019-07-30 NOTE — PROGRESS NOTES
Called member to wish her a happy birthday and to introduce myself as her SCPPA. No answer LM with call back number [phone]

## 2019-08-08 DIAGNOSIS — Z79.01 CHRONIC ANTICOAGULATION: ICD-10-CM

## 2019-08-08 DIAGNOSIS — F32.5 MAJOR DEPRESSIVE DISORDER WITH SINGLE EPISODE, IN FULL REMISSION (HCC): ICD-10-CM

## 2019-08-09 RX ORDER — PAROXETINE HYDROCHLORIDE 40 MG/1
TABLET, FILM COATED ORAL
Qty: 90 TAB | Refills: 1 | Status: SHIPPED | OUTPATIENT
Start: 2019-08-09 | End: 2020-02-25

## 2019-08-22 ENCOUNTER — TELEPHONE (OUTPATIENT)
Dept: VASCULAR LAB | Facility: MEDICAL CENTER | Age: 54
End: 2019-08-22

## 2019-08-22 NOTE — TELEPHONE ENCOUNTER
Pt's Xarelto prescribing and monitoring being done by PCP, Dr. Silver.  Will d/c from [employer].      YANI Simon  [facility]

## 2019-09-17 ENCOUNTER — OFFICE VISIT (OUTPATIENT)
Dept: URGENT CARE | Facility: PHYSICIAN GROUP | Age: 54
End: 2019-09-17
Payer: MEDICARE

## 2019-09-17 ENCOUNTER — APPOINTMENT (OUTPATIENT)
Dept: RADIOLOGY | Facility: IMAGING CENTER | Age: 54
End: 2019-09-17
Attending: PHYSICIAN ASSISTANT
Payer: MEDICARE

## 2019-09-17 ENCOUNTER — HOSPITAL ENCOUNTER (OUTPATIENT)
Dept: RADIOLOGY | Facility: MEDICAL CENTER | Age: 54
End: 2019-09-17
Attending: PHYSICIAN ASSISTANT
Payer: MEDICARE

## 2019-09-17 VITALS
OXYGEN SATURATION: 97 % | TEMPERATURE: 97.3 F | WEIGHT: 219.6 LBS | HEART RATE: 80 BPM | BODY MASS INDEX: 32.53 KG/M2 | HEIGHT: 69 IN | SYSTOLIC BLOOD PRESSURE: 128 MMHG | DIASTOLIC BLOOD PRESSURE: 78 MMHG

## 2019-09-17 DIAGNOSIS — R60.0 LOWER LEG EDEMA: ICD-10-CM

## 2019-09-17 DIAGNOSIS — S89.92XA INJURY OF LEFT KNEE, INITIAL ENCOUNTER: ICD-10-CM

## 2019-09-17 DIAGNOSIS — M70.52 BURSITIS OF LEFT KNEE, UNSPECIFIED BURSA: ICD-10-CM

## 2019-09-17 PROCEDURE — 99214 OFFICE O/P EST MOD 30 MIN: CPT | Performed by: PHYSICIAN ASSISTANT

## 2019-09-17 PROCEDURE — 93971 EXTREMITY STUDY: CPT | Mod: LT

## 2019-09-17 PROCEDURE — 73564 X-RAY EXAM KNEE 4 OR MORE: CPT | Mod: TC,LT | Performed by: PHYSICIAN ASSISTANT

## 2019-09-17 RX ORDER — BIOTIN 1000 MCG
3000 TABLET,CHEWABLE ORAL DAILY
COMMUNITY

## 2019-09-17 RX ORDER — CEPHALEXIN 500 MG/1
500 CAPSULE ORAL 4 TIMES DAILY
Qty: 28 CAP | Refills: 0 | Status: SHIPPED | OUTPATIENT
Start: 2019-09-17 | End: 2019-09-24

## 2019-09-17 ASSESSMENT — ENCOUNTER SYMPTOMS
VOMITING: 0
ABDOMINAL PAIN: 0
COUGH: 0
EYE REDNESS: 0
NAUSEA: 0
EYE DISCHARGE: 0
FEVER: 0
SORE THROAT: 0
SHORTNESS OF BREATH: 0
HEADACHES: 0

## 2019-09-17 NOTE — PROGRESS NOTES
Subjective:      Nadia Lazaro is a 54 y.o. female who presents with Knee Pain (L knee pain, swelling, hot, bruising, numbness, pt fell, x9 days )        Knee Pain   This is a new problem. Episode onset: x 9 days ago. The problem occurs constantly. The problem has been unchanged. Associated symptoms include joint swelling and numbness. Pertinent negatives include no abdominal pain, chest pain, congestion, coughing, fever, headaches, nausea, rash, sore throat, vomiting or weakness. Nothing aggravates the symptoms. She has tried nothing for the symptoms.     The patient presents to clinic c/o left knee pain secondary to a ground level fall that occurred 9 days ago. The patient states she tripped and fell, landing on her left knee. The patient reports associated bruising and swelling to her left knee. The patient also reports localized redness and increased warmth. No fever. The patient states he left knee feels numb. She reports no difficulty walking. No increased pain with walking. The patient is currently taking Xarelto for a blood clotting disorder. The patient hasn't taken anything for her current symptoms.     PMH:  has a past medical history of Anesthesia, Hyperlipidemia (1/3/2019), Indigestion (1/3/2019), Major depressive disorder with single episode, in full remission ([facility]) (5/17/2018), MS (multiple sclerosis) ([facility]), Protein S deficiency ([facility]), and Smoker (1/3/2019).  MEDS:   Current Outpatient Medications:   •  Biotin 1000 MCG Chew Tab, Take 3,000 mcg by mouth every day., Disp: , Rfl:   •  paroxetine (PAXIL) 40 MG tablet, TAKE 1 TABLET BY MOUTH EVERY DAY IN THE EVENING, Disp: 90 Tab, Rfl: 1  •  rivaroxaban (XARELTO) 20 MG Tab tablet, Take 1 Tab by mouth with dinner., Disp: 90 Tab, Rfl: 3  •  nicotine (NICODERM) 14 MG/24HR PATCH 24 HR, Use for first 6 weeks, Disp: 42 Patch, Rfl: 0  •  nicotine (NICODERM) 7 MG/24HR PATCH 24 HR, Use for 2 weeks after completing the 14 mg patch, Disp: 14 Patch, Rfl: 0  •   magnesium oxide (MAG-OX) 400 (241.3 Mg) MG Tab tablet, Take 1 Tab by mouth every day., Disp: 30 Tab, Rfl: 2  •  diphenhydrAMINE (BENADRYL) 25 MG Tab, Take 25 mg by mouth at bedtime as needed for Sleep., Disp: , Rfl:   •  calcium carbonate (TUMS) 500 MG Chew Tab, Take 500 mg by mouth every day., Disp: , Rfl:   •  fluticasone (FLONASE) 50 MCG/ACT nasal spray, Spray 1 Spray in nose every day., Disp: , Rfl:   •  Pain Pump (PATIENT SUPPLIED) XX LEXI, 1 Each by Injection route Continuous. Baclofen 20MCG daily Bolas 10MCG/ 250MCG per ML Pt last filled on 10/2018, Disp: , Rfl:   •  docosahexanoic acid (FISH OIL) 1000 MG CAPS, Take 1,000 mg by mouth every evening., Disp: , Rfl:   •  Natalizumab (TYSABRI IV), 1 Dose by Intravenous route Q 4 Weeks., Disp: , Rfl:   •  vitamin D (CHOLECALCIFEROL) 1000 UNIT TABS, Take 1,000 Units by mouth 2 Times a Day., Disp: , Rfl:   ALLERGIES:   Allergies   Allergen Reactions   • Nubain [Nalbuphine Hcl]      Seizures       SURGHX:   Past Surgical History:   Procedure Laterality Date   • HYSTERECTOMY ROBOTIC XI N/A 11/15/2018    Procedure: HYSTERECTOMY ROBOTIC XI;  Surgeon: Santo Watst M.D.;  Location: [facility];  Service: Gynecology   • SALPINGECTOMY Bilateral 11/15/2018    Procedure: SALPINGECTOMY;  Surgeon: Santo Watts M.D.;  [place] [facility];  Service: Gynecology   • OOPHORECTOMY Bilateral 11/15/2018    Procedure: OOPHORECTOMY;  Surgeon: Santo Watts M.D.;  [place] [facility];  Service: Gynecology   • OTHER  2016    IVC filter taken out   • PUMP INSERT/REMOVE  10/19/2010    Performed by SOMMER PAREDES at [facility]   • PUMP INSERT/REMOVE  8/24/2010    Performed by SOMMER PAREDES at [facility]   • OTHER  2010    IVC filter insertion     SOCHX:  reports that she has been smoking cigarettes. She has a 20.00 pack-year smoking history. She has never used smokeless tobacco. She reports that she does not drink  "alcohol or use drugs.  FH: Family history was reviewed, no pertinent findings to report       Review of Systems   Constitutional: Negative for fever.   HENT: Negative for congestion, ear pain and sore throat.    Eyes: Negative for discharge and redness.   Respiratory: Negative for cough and shortness of breath.    Cardiovascular: Positive for leg swelling (left lower leg). Negative for chest pain.   Gastrointestinal: Negative for abdominal pain, nausea and vomiting.   Musculoskeletal: Positive for joint pain (left knee) and joint swelling.   Skin: Negative for rash.   Neurological: Positive for numbness. Negative for weakness and headaches.          Objective:     /78 (BP Location: Left arm, Patient Position: Sitting, BP Cuff Size: Large adult)   Pulse 80   Temp 36.3 °C (97.3 °F) (Temporal)   Ht 1.753 m (5' 9\")   Wt 99.6 kg (219 lb 9.6 oz)   LMP 11/10/2018   SpO2 97%   BMI 32.43 kg/m²      Physical Exam   Constitutional: She is oriented to person, place, and time. She appears well-developed and well-nourished. No distress.   HENT:   Head: Normocephalic and atraumatic.   Right Ear: External ear normal.   Left Ear: External ear normal.   Nose: Nose normal.   Eyes: Conjunctivae and EOM are normal.   Neck: Normal range of motion. Neck supple.   Cardiovascular: Normal rate.   Pulmonary/Chest: Effort normal.   Musculoskeletal:        Legs:  Left Knee:  Diffuse tenderness to the left knee with associated swelling and ecchymosis. Localized erythema to the distal aspect of the left knee with increased warmth. No open wounds/lesions.   ROM intact  Decreased sensation to the left knee; the patient reports tingling with palpation  Antalgic gait  Left Lower Leg:  Diffuse swelling to the left lower leg with generalized ecchymosis to the anterior shin. Mild tenderness to the anterior lower leg over the shin. No erythema. No increased warmth.    Neurological: She is alert and oriented to person, place, and time. "   Skin: Skin is warm and dry.          Progress:  Left Knee XR:  FINDINGS:    BONE MINERALIZATION: Normal.  JOINTS: Severe patellofemoral and mild to moderate medial compartment osteoarthrosis. No erosions.  FRACTURE: None.  DISLOCATION: None.  SOFT TISSUES: No mass.      Impression       1.  No fracture or dislocation.  2.  Severe patellofemoral and mild-to-moderate medial compartment osteoarthrosis.     Left Lower Extremity US:  COMPARISON:  10/30/2018    FINDINGS:    REAL-TIME GRAY-SCALE IMAGING:  Real-time gray-scale imaging reveals no evidence of focal wall thickening.    COLOR AND DUPLEX DOPPLER IMAGING:  Chronic appearing echogenic thrombus is identified with lack of complete compressibility in the common femoral vein and femoral vein and popliteal vein. No acute thrombosis is identified. Veins appear patent and demonstrates slightly dampened phasicity   and pulsatility. Chronic thrombus is also identified in the posterior tibial vein and peroneal vein.  Complex fluid collections in the left knee area are noted measuring up to 3.3 x 1.2 x 5.6 cm.      Impression       1.  No evidence of acute left lower extremity deep venous thrombosis.    2.  Chronic thrombus again identified throughout the left lower extremity with no significant change.     Called the patient with the results of her venous US.     Assessment/Plan:     1. Injury of left knee, initial encounter  - DX-KNEE COMPLETE 4+ LEFT; Future  - REFERRAL TO ORTHOPEDICS    2. Bursitis of left knee, unspecified bursa  - cephALEXin (KEFLEX) 500 MG Cap; Take 1 Cap by mouth 4 times a day for 7 days.  Dispense: 28 Cap; Refill: 0  - REFERRAL TO ORTHOPEDICS    3. Lower leg edema  - US-EXTREMITY VENOUS LOWER UNILAT LEFT; Future    The patient's presenting symptoms and physical exam are consistent with an injury to her left knee.  The patient's left knee x-ray today in clinic showed no fracture or dislocation.  The radiologist noted severe patellofemoral and mild  to moderate medial compartment osteoarthritis.  On physical exam, the patient had diffuse tenderness to the left knee with associated swelling and ecchymosis.  Localized erythema was present to the distal aspect of the left knee with increased warmth.   The patient also had diffuse swelling to the left lower leg with generalized ecchymosis.  Given the patient's history of a blood clotting disorder, a left lower extremity venous ultrasound was obtained.  The patient's left lower extremity venous ultrasound showed no evidence of acute left lower extremity DVT.  A chronic thrombus was again identified throughout the left lower extremity with no significant change. Based on the patient's presenting symptoms and physical exam, it is likely her current symptoms may be due to acute bursitis.  Will prescribe the patient Keflex for her current symptoms.  Will also refer the patient to Ortho for further evaluation of her current symptoms.  The patient's vital signs are stable and within normal limits.  Advised the patient to follow-up with her PCP.  Recommend OTC medications and supportive care for symptomatic management.  Discussed strict ED precautions with the patient, and she verbalized understanding.    Differential diagnoses, supportive care, and indications for immediate follow-up discussed with patient.   Instructed to return to clinic or nearest emergency department for any change in condition, further concerns, or worsening of symptoms.    OTC Tylenol for pain/discomfort  RICE  Referral to Ortho   Follow-up with PCP as needed  Return to clinic or go to the ED if symptoms worsen or fail to improve, or if the patient should develop worsening/increasing knee pain, swelling, bruising, increased redness or warmth, numbness, tingling, or weakness, decreased ROM, difficulty walking, fever/chills, and/or any concerning symptoms.     Discussed plan with the patient, and she agrees to the above.

## 2019-09-19 ASSESSMENT — ENCOUNTER SYMPTOMS
JOINT SWELLING: 1
WEAKNESS: 0
NUMBNESS: 1

## 2019-09-26 ENCOUNTER — TELEPHONE (OUTPATIENT)
Dept: MEDICAL GROUP | Facility: PHYSICIAN GROUP | Age: 54
End: 2019-09-26

## 2019-09-26 NOTE — TELEPHONE ENCOUNTER
Future Appointments       Provider Department Center    10/1/2019 10:10 AM Dea Graham D.O. [facility]        ESTABLISHED PATIENT PRE-VISIT PLANNING     Patient was NOT contacted to complete PVP.     Note: Patient will not be contacted if there is no indication to call.     1.  Reviewed notes from the last few office visits within the medical group: Yes    2.  If any orders were placed at last visit or intended to be done for this visit (i.e. 6 mos follow-up), do we have Results/Consult Notes?        •  Labs - Labs ordered, NOT completed. Patient advised to complete prior to next appointment.   Note: If patient appointment is for lab review and patient did not complete labs, check with provider if OK to reschedule patient until labs completed.       •  Imaging - Imaging ordered, completed and results are in chart.       •  Referrals - No referrals were ordered at last office visit.    3. Is this appointment scheduled as a Hospital Follow-Up? No    4.  Immunizations were updated in [employer] using WebIZ?: Yes       •  Web Iz Recommendations: FLU, MMR , TD, VARICELLA (Chicken Pox)  and SHINGRIX (Shingles)    5.  Patient is due for the following Health Maintenance Topics:   Health Maintenance Due   Topic Date Due   • Annual Wellness Visit  1965   • HEPATITIS C SCREENING  1965   • IMM PNEUMOCOCCAL VACCINE: 0-64 Years (1 of 1 - PPSV23) 07/31/1971   • IMM INFLUENZA (1) 09/01/2019       6. Orders for overdue Health Maintenance topics pended in Pre-Charting? YES    7.  AHA (MDX) form printed for Provider? No, already completed    8.  Patient was informed to arrive 15 min prior to their scheduled appointment and bring in their medication bottles.

## 2019-10-01 ENCOUNTER — OFFICE VISIT (OUTPATIENT)
Dept: MEDICAL GROUP | Facility: PHYSICIAN GROUP | Age: 54
End: 2019-10-01
Payer: MEDICARE

## 2019-10-01 VITALS
WEIGHT: 220.4 LBS | OXYGEN SATURATION: 99 % | HEART RATE: 77 BPM | DIASTOLIC BLOOD PRESSURE: 82 MMHG | HEIGHT: 69 IN | SYSTOLIC BLOOD PRESSURE: 124 MMHG | TEMPERATURE: 97.3 F | BODY MASS INDEX: 32.64 KG/M2

## 2019-10-01 DIAGNOSIS — G35 MS (MULTIPLE SCLEROSIS) (HCC): ICD-10-CM

## 2019-10-01 DIAGNOSIS — Z23 NEED FOR VACCINATION: ICD-10-CM

## 2019-10-01 PROCEDURE — G0008 ADMIN INFLUENZA VIRUS VAC: HCPCS | Performed by: FAMILY MEDICINE

## 2019-10-01 PROCEDURE — 90732 PPSV23 VACC 2 YRS+ SUBQ/IM: CPT | Performed by: FAMILY MEDICINE

## 2019-10-01 PROCEDURE — G0009 ADMIN PNEUMOCOCCAL VACCINE: HCPCS | Performed by: FAMILY MEDICINE

## 2019-10-01 PROCEDURE — 90686 IIV4 VACC NO PRSV 0.5 ML IM: CPT | Performed by: FAMILY MEDICINE

## 2019-10-01 PROCEDURE — 99214 OFFICE O/P EST MOD 30 MIN: CPT | Mod: 25 | Performed by: FAMILY MEDICINE

## 2019-10-01 NOTE — PROGRESS NOTES
CC: MS, history of knee infection    HISTORY OF THE PRESENT ILLNESS: Patient is a 54 y.o. female. This pleasant patient is here today to establish care and discuss health problems below.    Patient is here to establish care.  Her main concern today is her medication for multiple sclerosis.  She reports having a fall on September 5 and standing up her knee quite severely.  She was seen for this in urgent care on September 17, diagnosed with an infection and put on Keflex for 7 days.  She was referred to [facility] as well at that time.  She reports that she was told she has bursitis and arthritis and needs to follow-up with an occupational therapist.  Referral was placed.  As a result of having an infection, patient states her neurologist took her off her Tysabri infusions for her MS, as it is an immune suppressant.  Patient reports that she feels her MS worsening since she has been off the medication and would like to get back on.  Apparently her neurologist is requiring her PCP to see that she no longer has infection prior to restarting her infusions.    With regard to her knee, she reports that she still has some swelling within the knee, but no warmth or redness.  The knee really does not hurt when she is moving her leg up and down and is not tender to touch.  She reports that she was told that the swelling is due to arthritis and that it would go away on its own by [facility].    Other than that she would like to get flu vaccine and pneumonia vaccine today.    Allergies: Nubain [nalbuphine hcl]    Current Outpatient Medications Ordered in Epic   Medication Sig Dispense Refill   • Biotin 1000 MCG Chew Tab Take 3,000 mcg by mouth every day.     • paroxetine (PAXIL) 40 MG tablet TAKE 1 TABLET BY MOUTH EVERY DAY IN THE EVENING 90 Tab 1   • rivaroxaban (XARELTO) 20 MG Tab tablet Take 1 Tab by mouth with dinner. 90 Tab 3   • magnesium oxide (MAG-OX) 400 (241.3 Mg) MG Tab tablet Take 1 Tab by  mouth every day. 30 Tab 2   • diphenhydrAMINE (BENADRYL) 25 MG Tab Take 25 mg by mouth at bedtime as needed for Sleep.     • calcium carbonate (TUMS) 500 MG Chew Tab Take 500 mg by mouth every day.     • fluticasone (FLONASE) 50 MCG/ACT nasal spray Spray 1 Spray in nose every day.     • Pain Pump (PATIENT SUPPLIED) XX LEXI 1 Each by Injection route Continuous. Baclofen 20MCG daily  Bolas 10MCG/ 250MCG per ML  Pt last filled on 10/2018     • docosahexanoic acid (FISH OIL) 1000 MG CAPS Take 1,000 mg by mouth every evening.     • Natalizumab (TYSABRI IV) 1 Dose by Intravenous route Q 4 Weeks.     • vitamin D (CHOLECALCIFEROL) 1000 UNIT TABS Take 1,000 Units by mouth 2 Times a Day.       No current [facility]-ordered facility-administered medications on file.        Past Medical History:   Diagnosis Date   • Anesthesia     PONV   • Hyperlipidemia 1/3/2019   • Indigestion 1/3/2019   • Major depressive disorder with single episode, in full remission (HCC) 5/17/2018   • MS (multiple sclerosis) ([facility])    • Protein S deficiency (HCC)     on coumadin   • Smoker 1/3/2019       Past Surgical History:   Procedure Laterality Date   • HYSTERECTOMY ROBOTIC XI N/A 11/15/2018    Procedure: HYSTERECTOMY ROBOTIC XI;  Surgeon: Santo Watts M.D.;  Location: [facility];  Service: Gynecology   • SALPINGECTOMY Bilateral 11/15/2018    Procedure: SALPINGECTOMY;  Surgeon: Santo Watts M.D.;  Location: [facility];  Service: Gynecology   • OOPHORECTOMY Bilateral 11/15/2018    Procedure: OOPHORECTOMY;  Surgeon: Santo Watts M.D.;  Location: [facility];  Service: Gynecology   • OTHER  2016    IVC filter taken out   • PUMP INSERT/REMOVE  10/19/2010    Performed by SOMMER PAREDES at [facility]   • PUMP INSERT/REMOVE  8/24/2010    Performed by SOMMER PAREDES at [facility]   • OTHER  2010    IVC filter insertion       Social History     Tobacco Use   • Smoking status: Current Every Day  "Smoker     Packs/day: 0.50     Years: 40.00     Pack years: 20.00     Types: Cigarettes   • Smokeless tobacco: Never Used   • Tobacco comment: 4 cigs/day, trying to quit   Substance Use Topics   • Alcohol use: No   • Drug use: No       Social History     Social History Narrative    Used to work for [employer], not working presently        Family History   Problem Relation Age of Onset   • Hypertension Other    • No Known Problems Mother    • Lung Disease Father         emphysema   • No Known Problems Sister    • No Known Problems Child    • Cancer Neg Hx    • Diabetes Neg Hx        ROS:     - Constitutional: Negative for fever, chills, unexpected weight change, and fatigue/generalized weakness.     - Respiratory: Negative for cough, sputum production, chest congestion, dyspnea, wheezing, and crackles.      - Cardiovascular: Negative for chest pain, palpitations, orthopnea, PND, and bilateral lower extremity edema.     Exam: /82 (BP Location: Left arm, Patient Position: Sitting, BP Cuff Size: Adult)   Pulse 77   Temp 36.3 °C (97.3 °F) (Temporal)   Ht 1.753 m (5' 9\")   Wt 100 kg (220 lb 6.4 oz)   SpO2 99%  Body mass index is 32.55 kg/m².    General: Well appearing, NAD  Pulmonary: Clear to ausculation.  Normal effort. No rales, ronchi, or wheezing.  Cardiovascular: Regular rate and rhythm without murmur, rubs or gallop.   Skin: Warm and dry.  No obvious lesions.  Musculoskeletal: Grade 3 effusion noted of left knee.  No warmth or erythema.  No tenderness to palpation or pain with flexion or extension.  Psych: Normal mood and affect. Alert and oriented. Judgment and insight is normal.    Please note that this dictation was created using voice recognition software. I have made every reasonable attempt to correct obvious errors, but I expect that there are errors of grammar and possibly content that I did not discover before finalizing the note.      Assessment/Plan  Nadia was seen today for establish " care and follow-up.    Diagnoses and all orders for this visit:    Need for vaccination  -     Pneumovax Vaccine (PPSV23)  -     Influenza Vaccine Quad Injection (PF)    MS (multiple sclerosis) (HCC)  Chronic issue for patient, currently worsening due to being off her Tysabri infusions.  Based on the fact that her symptoms are improving and she is not doing well after 7 days of Keflex a couple of weeks ago, I do not suspect further joint infection.  We will go ahead and request records from [place] [facility] as well to ensure that they agree upon that.    Follow-up if symptoms not improving.    Dea Graham,   [facility]

## 2019-10-16 ENCOUNTER — TELEPHONE (OUTPATIENT)
Dept: MEDICAL GROUP | Facility: PHYSICIAN GROUP | Age: 54
End: 2019-10-16

## 2019-10-16 NOTE — TELEPHONE ENCOUNTER
IRAIDATCB    Per Dr. CARD, we have received the [facility] Records. Nadia is ok'd to proceed with her Tysabri injections, also ok if she is already doing them. Thank you.

## 2019-10-17 NOTE — TELEPHONE ENCOUNTER
Phone Number Called: [phone] (home) [phone] (work)    Call outcome: spoke to patient regarding message below

## 2020-01-27 ENCOUNTER — TELEPHONE (OUTPATIENT)
Dept: MEDICAL GROUP | Facility: PHYSICIAN GROUP | Age: 55
End: 2020-01-27

## 2020-01-27 NOTE — TELEPHONE ENCOUNTER
Future Appointments       Provider Department Center    1/28/2020 10:50 AM Dea Graham D.O. [facility]        ESTABLISHED PATIENT PRE-VISIT PLANNING     Patient was NOT contacted to complete PVP.    1.  Reviewed notes from the last few office visits within the medical group: Yes    2.  If any orders were placed at last visit or intended to be done for this visit (i.e. 6 mos follow-up), do we have Results/Consult Notes?        •  Labs - Labs were not ordered at last office visit.       •  Imaging - Imaging was not ordered at last office visit.       •  Referrals - No referrals were ordered at last office visit.    3. Is this appointment scheduled as a Hospital Follow-Up? No    4.  Immunizations were updated in Epic using WebIZ?: Yes       •  Web Iz Recommendations: MMR , TDAP, VARICELLA (Chicken Pox)  and SHINGRIX (Shingles)    5.  Patient is due for the following Health Maintenance Topics:   Health Maintenance Due   Topic Date Due   • Annual Wellness Visit  1965   • HEPATITIS C SCREENING  1965   • IMM ZOSTER VACCINES (1 of 2) 07/31/2015     6. Orders for overdue Health Maintenance topics pended in Pre-Charting? NO    7.  AHA (MDX) form printed for Provider? YES    8.  Patient was NOT informed to arrive 15 min prior to their scheduled appointment and bring in their medication bottles.

## 2020-01-28 ENCOUNTER — OFFICE VISIT (OUTPATIENT)
Dept: MEDICAL GROUP | Facility: PHYSICIAN GROUP | Age: 55
End: 2020-01-28
Payer: MEDICARE

## 2020-01-28 VITALS
DIASTOLIC BLOOD PRESSURE: 68 MMHG | HEIGHT: 69 IN | BODY MASS INDEX: 32.44 KG/M2 | SYSTOLIC BLOOD PRESSURE: 122 MMHG | OXYGEN SATURATION: 97 % | TEMPERATURE: 97.4 F | HEART RATE: 89 BPM | WEIGHT: 219 LBS

## 2020-01-28 DIAGNOSIS — G35 MS (MULTIPLE SCLEROSIS) (HCC): ICD-10-CM

## 2020-01-28 DIAGNOSIS — R20.0 NUMBNESS AND TINGLING OF BOTH LEGS: ICD-10-CM

## 2020-01-28 DIAGNOSIS — Z11.59 NEED FOR HEPATITIS C SCREENING TEST: ICD-10-CM

## 2020-01-28 DIAGNOSIS — G89.29 CHRONIC RIGHT-SIDED LOW BACK PAIN, UNSPECIFIED WHETHER SCIATICA PRESENT: ICD-10-CM

## 2020-01-28 DIAGNOSIS — R20.2 NUMBNESS AND TINGLING OF BOTH LEGS: ICD-10-CM

## 2020-01-28 DIAGNOSIS — M17.12 ARTHRITIS OF LEFT KNEE: ICD-10-CM

## 2020-01-28 DIAGNOSIS — M54.50 CHRONIC RIGHT-SIDED LOW BACK PAIN, UNSPECIFIED WHETHER SCIATICA PRESENT: ICD-10-CM

## 2020-01-28 DIAGNOSIS — Z51.81 MEDICATION MONITORING ENCOUNTER: ICD-10-CM

## 2020-01-28 DIAGNOSIS — Z00.00 PREVENTATIVE HEALTH CARE: ICD-10-CM

## 2020-01-28 PROCEDURE — 99214 OFFICE O/P EST MOD 30 MIN: CPT | Performed by: FAMILY MEDICINE

## 2020-01-28 NOTE — PROGRESS NOTES
CC: Numbness and tingling of legs    HISTORY OF THE PRESENT ILLNESS: Patient is a 54 y.o. female. This pleasant patient is here today to discuss the following issues.    Patient is here today to discuss tingling and burning sensation in her legs.  She does have a known history of severe left knee osteoarthritis.  This knee has been bothering her intermittently with significant flares including knee effusion.  She is wondering if she should get back in with orthopedic doctor.  She was previously seen by [facility] but reportedly they did not offer any interventions to her.    In the meantime, patient reports that she has had quite a bit of tingling and burning sensation on her left leg.  States that it seems to be going past the point of being numb to the point of being quite painful.  She actually reports the sensation on both thighs.  But the left is greater than the right.  She does have known multiple sclerosis and follows with neurology.  She takes Tysabri.  She has not discussed the symptoms with her neurologist.  Reports that her last MRI of her lumbar spine was likely at least 2 years ago.  She does report that she has poor balance and that her legs give out intermittently.  She does also endorse left-sided low back pain which does seem to radiate down into her buttock and leg.  She feels this is not related to her MS.    Allergies: Nubain [nalbuphine hcl]    Current Outpatient Medications Ordered in Epic   Medication Sig Dispense Refill   • Biotin 1000 MCG Chew Tab Take 3,000 mcg by mouth every day.     • paroxetine (PAXIL) 40 MG tablet TAKE 1 TABLET BY MOUTH EVERY DAY IN THE EVENING 90 Tab 1   • rivaroxaban (XARELTO) 20 MG Tab tablet Take 1 Tab by mouth with dinner. 90 Tab 3   • magnesium oxide (MAG-OX) 400 (241.3 Mg) MG Tab tablet Take 1 Tab by mouth every day. 30 Tab 2   • diphenhydrAMINE (BENADRYL) 25 MG Tab Take 25 mg by mouth at bedtime as needed for Sleep.     • calcium carbonate (TUMS) 500 MG  Chew Tab Take 500 mg by mouth every day.     • fluticasone (FLONASE) 50 MCG/ACT nasal spray Spray 1 Spray in nose every day.     • Pain Pump (PATIENT SUPPLIED) XX LEXI 1 Each by Injection route Continuous. Baclofen 20MCG daily  Bolas 10MCG/ 250MCG per ML  Pt last filled on 10/2018     • docosahexanoic acid (FISH OIL) 1000 MG CAPS Take 1,000 mg by mouth every evening.     • Natalizumab (TYSABRI IV) 1 Dose by Intravenous route Q 4 Weeks.     • vitamin D (CHOLECALCIFEROL) 1000 UNIT TABS Take 1,000 Units by mouth 2 Times a Day.       No current [facility]-ordered facility-administered medications on file.        Past Medical History:   Diagnosis Date   • Anesthesia     PONV   • Hyperlipidemia 1/3/2019   • Indigestion 1/3/2019   • Major depressive disorder with single episode, in full remission (HCC) 5/17/2018   • MS (multiple sclerosis) ([facility])    • Protein S deficiency ([facility])     on coumadin   • Smoker 1/3/2019       Past Surgical History:   Procedure Laterality Date   • HYSTERECTOMY ROBOTIC XI N/A 11/15/2018    Procedure: HYSTERECTOMY ROBOTIC XI;  Surgeon: Santo Watts M.D.;  Location: SURGERY [facility];  Service: Gynecology   • SALPINGECTOMY Bilateral 11/15/2018    Procedure: SALPINGECTOMY;  Surgeon: Santo Watts M.D.;  Location: [facility];  Service: Gynecology   • OOPHORECTOMY Bilateral 11/15/2018    Procedure: OOPHORECTOMY;  Surgeon: Santo Watts M.D.;  Location: SURGERY [facility];  Service: Gynecology   • OTHER  2016    IVC filter taken out   • PUMP INSERT/REMOVE  10/19/2010    Performed by SOMMER PAREDES at [facility]   • PUMP INSERT/REMOVE  8/24/2010    Performed by SOMMER PAREDES at [facility]   • OTHER  2010    IVC filter insertion       Social History     Tobacco Use   • Smoking status: Current Every Day Smoker     Packs/day: 0.50     Years: 40.00     Pack years: 20.00     Types: Cigarettes   • Smokeless tobacco: Never Used   • Tobacco comment: 3-5 cigs/day,  "trying to quit   Substance Use Topics   • Alcohol use: No   • Drug use: No       Social History     Patient does not qualify to have social determinant information on file (likely too young).   Social History Narrative    Used to work for [employer], not working presently        Family History   Problem Relation Age of Onset   • Hypertension Other    • No Known Problems Mother    • Lung Disease Father         emphysema   • No Known Problems Sister    • No Known Problems Child    • Cancer Neg Hx    • Diabetes Neg Hx        ROS:     - Constitutional: Negative for fever, chills, unexpected weight change, and fatigue/generalized weakness.     - Respiratory: Negative for cough, sputum production, chest congestion, dyspnea, wheezing, and crackles.      - Cardiovascular: Negative for chest pain, palpitations, orthopnea, PND, and bilateral lower extremity edema.     Exam: /68 (BP Location: Right arm, Patient Position: Sitting, BP Cuff Size: Adult)   Pulse 89   Temp 36.3 °C (97.4 °F) (Temporal)   Ht 1.753 m (5' 9\")   Wt 99.3 kg (219 lb)   SpO2 97%  Body mass index is 32.34 kg/m².    General: Well appearing, NAD  Skin: Warm and dry.  No obvious lesions.  Musculoskeletal: Normal lower extremity strength and gait.  Left knee with grade 2 effusion and tenderness to palpation along the joint line.  Negative straight leg test bilaterally.  No tenderness to palpation along the lumbar spine or lumbar paraspinals.  Psych: Normal mood and affect. Alert and oriented. Judgment and insight is normal.    Please note that this dictation was created using voice recognition software. I have made every reasonable attempt to correct obvious errors, but I expect that there are errors of grammar and possibly content that I did not discover before finalizing the note.      Assessment/Plan  Nadia was seen today for edema.    Diagnoses and all orders for this visit:    MS (multiple sclerosis) (HCC)  Chronic right-sided low back " pain, unspecified whether sciatica present  Numbness and tingling of both legs  These are all chronic issues for the patient though the numbness and tingling of her legs is fairly new.  Recommend MRI at this time, concern for possible lumbar nerve impingement versus possible pathology related to her multiple sclerosis causing her symptoms.  -     MR-LUMBAR SPINE-W/O; Future    Arthritis of left knee  Chronic uncontrolled issue with recent x-ray showing moderate to severe tricompartmental arthritis.  Recommend referral to orthopedics at this time.  -     REFERRAL TO ORTHOPEDICS    Preventative health care  -     Comp Metabolic Panel; Future  -     CBC WITH DIFFERENTIAL; Future  -     Lipid Profile; Future  -     VITAMIN D,25 HYDROXY; Future    Medication monitoring encounter  -     Comp Metabolic Panel; Future  -     CBC WITH DIFFERENTIAL; Future    Need for hepatitis C screening test  -     HEP C VIRUS ANTIBODY; Future    Follow-up in 1 month for annual physical and lab review.    Dea Graham,   [facility]

## 2020-02-15 ENCOUNTER — HOSPITAL ENCOUNTER (OUTPATIENT)
Dept: LAB | Facility: MEDICAL CENTER | Age: 55
End: 2020-02-15
Attending: FAMILY MEDICINE
Payer: MEDICARE

## 2020-02-15 DIAGNOSIS — Z11.59 NEED FOR HEPATITIS C SCREENING TEST: ICD-10-CM

## 2020-02-15 DIAGNOSIS — Z51.81 MEDICATION MONITORING ENCOUNTER: ICD-10-CM

## 2020-02-15 DIAGNOSIS — Z00.00 PREVENTATIVE HEALTH CARE: ICD-10-CM

## 2020-02-15 LAB
25(OH)D3 SERPL-MCNC: 26 NG/ML (ref 30–100)
ALBUMIN SERPL BCP-MCNC: 4.1 G/DL (ref 3.2–4.9)
ALBUMIN/GLOB SERPL: 1.5 G/DL
ALP SERPL-CCNC: 114 U/L (ref 30–99)
ALT SERPL-CCNC: 12 U/L (ref 2–50)
ANION GAP SERPL CALC-SCNC: 10 MMOL/L (ref 0–11.9)
ANISOCYTOSIS BLD QL SMEAR: ABNORMAL
AST SERPL-CCNC: 15 U/L (ref 12–45)
BASOPHILS # BLD AUTO: 1.7 % (ref 0–1.8)
BASOPHILS # BLD: 0.18 K/UL (ref 0–0.12)
BILIRUB SERPL-MCNC: 0.5 MG/DL (ref 0.1–1.5)
BUN SERPL-MCNC: 15 MG/DL (ref 8–22)
CALCIUM SERPL-MCNC: 9.5 MG/DL (ref 8.5–10.5)
CHLORIDE SERPL-SCNC: 108 MMOL/L (ref 96–112)
CHOLEST SERPL-MCNC: 205 MG/DL (ref 100–199)
CO2 SERPL-SCNC: 25 MMOL/L (ref 20–33)
CREAT SERPL-MCNC: 0.9 MG/DL (ref 0.5–1.4)
EOSINOPHIL # BLD AUTO: 0.28 K/UL (ref 0–0.51)
EOSINOPHIL NFR BLD: 2.6 % (ref 0–6.9)
ERYTHROCYTE [DISTWIDTH] IN BLOOD BY AUTOMATED COUNT: 51.3 FL (ref 35.9–50)
FASTING STATUS PATIENT QL REPORTED: NORMAL
GLOBULIN SER CALC-MCNC: 2.8 G/DL (ref 1.9–3.5)
GLUCOSE SERPL-MCNC: 78 MG/DL (ref 65–99)
HCT VFR BLD AUTO: 47.1 % (ref 37–47)
HCV AB SER QL: NEGATIVE
HDLC SERPL-MCNC: 44 MG/DL
HGB BLD-MCNC: 15.5 G/DL (ref 12–16)
LDLC SERPL CALC-MCNC: 140 MG/DL
LYMPHOCYTES # BLD AUTO: 5.64 K/UL (ref 1–4.8)
LYMPHOCYTES NFR BLD: 52.2 % (ref 22–41)
MANUAL DIFF BLD: NORMAL
MCH RBC QN AUTO: 31.7 PG (ref 27–33)
MCHC RBC AUTO-ENTMCNC: 32.9 G/DL (ref 33.6–35)
MCV RBC AUTO: 96.3 FL (ref 81.4–97.8)
MICROCYTES BLD QL SMEAR: ABNORMAL
MONOCYTES # BLD AUTO: 0.84 K/UL (ref 0–0.85)
MONOCYTES NFR BLD AUTO: 7.8 % (ref 0–13.4)
MORPHOLOGY BLD-IMP: NORMAL
NEUTROPHILS # BLD AUTO: 3.86 K/UL (ref 2–7.15)
NEUTROPHILS NFR BLD: 35.7 % (ref 44–72)
NRBC # BLD AUTO: 0.03 K/UL
NRBC BLD-RTO: 0.3 /100 WBC
PLATELET # BLD AUTO: 249 K/UL (ref 164–446)
PLATELET BLD QL SMEAR: NORMAL
PMV BLD AUTO: 11.5 FL (ref 9–12.9)
POTASSIUM SERPL-SCNC: 4 MMOL/L (ref 3.6–5.5)
PROT SERPL-MCNC: 6.9 G/DL (ref 6–8.2)
RBC # BLD AUTO: 4.89 M/UL (ref 4.2–5.4)
RBC BLD AUTO: PRESENT
SODIUM SERPL-SCNC: 143 MMOL/L (ref 135–145)
TRIGL SERPL-MCNC: 107 MG/DL (ref 0–149)
WBC # BLD AUTO: 10.8 K/UL (ref 4.8–10.8)

## 2020-02-15 PROCEDURE — 82306 VITAMIN D 25 HYDROXY: CPT

## 2020-02-15 PROCEDURE — 80061 LIPID PANEL: CPT

## 2020-02-15 PROCEDURE — 36415 COLL VENOUS BLD VENIPUNCTURE: CPT

## 2020-02-15 PROCEDURE — 80053 COMPREHEN METABOLIC PANEL: CPT

## 2020-02-15 PROCEDURE — 85027 COMPLETE CBC AUTOMATED: CPT

## 2020-02-15 PROCEDURE — 85007 BL SMEAR W/DIFF WBC COUNT: CPT

## 2020-02-15 PROCEDURE — 86803 HEPATITIS C AB TEST: CPT

## 2020-02-18 PROBLEM — R74.8 ELEVATED ALKALINE PHOSPHATASE LEVEL: Status: ACTIVE | Noted: 2020-02-18

## 2020-02-20 ENCOUNTER — HOSPITAL ENCOUNTER (OUTPATIENT)
Dept: RADIOLOGY | Facility: MEDICAL CENTER | Age: 55
End: 2020-02-20
Attending: FAMILY MEDICINE
Payer: MEDICARE

## 2020-02-20 DIAGNOSIS — G89.29 CHRONIC RIGHT-SIDED LOW BACK PAIN, UNSPECIFIED WHETHER SCIATICA PRESENT: ICD-10-CM

## 2020-02-20 DIAGNOSIS — G35 MS (MULTIPLE SCLEROSIS) (HCC): ICD-10-CM

## 2020-02-20 DIAGNOSIS — M54.50 CHRONIC RIGHT-SIDED LOW BACK PAIN, UNSPECIFIED WHETHER SCIATICA PRESENT: ICD-10-CM

## 2020-02-20 DIAGNOSIS — R20.0 LOWER EXTREMITY NUMBNESS: ICD-10-CM

## 2020-02-20 DIAGNOSIS — R20.0 NUMBNESS AND TINGLING OF BOTH LEGS: ICD-10-CM

## 2020-02-20 DIAGNOSIS — R20.2 NUMBNESS AND TINGLING OF BOTH LEGS: ICD-10-CM

## 2020-02-20 DIAGNOSIS — M51.36 DDD (DEGENERATIVE DISC DISEASE), LUMBAR: ICD-10-CM

## 2020-02-20 PROCEDURE — 72148 MRI LUMBAR SPINE W/O DYE: CPT

## 2020-02-24 DIAGNOSIS — F32.5 MAJOR DEPRESSIVE DISORDER WITH SINGLE EPISODE, IN FULL REMISSION (HCC): ICD-10-CM

## 2020-02-25 RX ORDER — PAROXETINE HYDROCHLORIDE 40 MG/1
TABLET, FILM COATED ORAL
Qty: 90 TAB | Refills: 1 | Status: SHIPPED | OUTPATIENT
Start: 2020-02-25 | End: 2020-08-17

## 2020-02-25 NOTE — TELEPHONE ENCOUNTER
Was the patient seen in the last year in this department? Yes    Does patient have an active prescription for medications requested? No     Received Request Via: Pharmacy      Pt met protocol?: Yes    OV 1/20

## 2020-07-30 ENCOUNTER — PATIENT OUTREACH (OUTPATIENT)
Dept: HEALTH INFORMATION MANAGEMENT | Facility: OTHER | Age: 55
End: 2020-07-30

## 2020-07-30 NOTE — PROGRESS NOTES
Called member to introduce myself as their SCP  and to wish a Happy Birthday. No answer LM with call back number [phone]

## 2020-08-17 DIAGNOSIS — F32.5 MAJOR DEPRESSIVE DISORDER WITH SINGLE EPISODE, IN FULL REMISSION (HCC): ICD-10-CM

## 2020-08-17 RX ORDER — PAROXETINE HYDROCHLORIDE 40 MG/1
TABLET, FILM COATED ORAL
Qty: 90 TAB | Refills: 1 | Status: SHIPPED | OUTPATIENT
Start: 2020-08-17 | End: 2021-02-16

## 2020-10-22 ENCOUNTER — HOSPITAL ENCOUNTER (OUTPATIENT)
Dept: RADIOLOGY | Facility: MEDICAL CENTER | Age: 55
End: 2020-10-22
Attending: PSYCHIATRY & NEUROLOGY
Payer: MEDICARE

## 2020-10-29 ENCOUNTER — APPOINTMENT (OUTPATIENT)
Dept: RADIOLOGY | Facility: MEDICAL CENTER | Age: 55
End: 2020-10-29
Attending: PHYSICIAN ASSISTANT
Payer: MEDICARE

## 2020-10-29 ENCOUNTER — APPOINTMENT (OUTPATIENT)
Dept: RADIOLOGY | Facility: MEDICAL CENTER | Age: 55
End: 2020-10-29
Attending: PSYCHIATRY & NEUROLOGY
Payer: MEDICARE

## 2020-11-10 ENCOUNTER — HOSPITAL ENCOUNTER (OUTPATIENT)
Dept: RADIOLOGY | Facility: MEDICAL CENTER | Age: 55
End: 2020-11-10
Attending: PSYCHIATRY & NEUROLOGY
Payer: MEDICARE

## 2020-11-10 ENCOUNTER — HOSPITAL ENCOUNTER (OUTPATIENT)
Dept: RADIOLOGY | Facility: MEDICAL CENTER | Age: 55
End: 2020-11-10
Attending: PHYSICIAN ASSISTANT
Payer: MEDICARE

## 2020-11-10 DIAGNOSIS — G35 MULTIPLE SCLEROSIS (HCC): ICD-10-CM

## 2020-11-10 PROCEDURE — 72157 MRI CHEST SPINE W/O & W/DYE: CPT

## 2020-11-10 PROCEDURE — 70553 MRI BRAIN STEM W/O & W/DYE: CPT

## 2020-11-10 PROCEDURE — 700117 HCHG RX CONTRAST REV CODE 255: Performed by: PSYCHIATRY & NEUROLOGY

## 2020-11-10 PROCEDURE — 72156 MRI NECK SPINE W/O & W/DYE: CPT

## 2020-11-10 PROCEDURE — A9576 INJ PROHANCE MULTIPACK: HCPCS | Performed by: PSYCHIATRY & NEUROLOGY

## 2020-11-10 RX ADMIN — GADOTERIDOL 20 ML: 279.3 INJECTION, SOLUTION INTRAVENOUS at 14:56

## 2020-12-03 RX ORDER — FLUCONAZOLE 150 MG/1
TABLET ORAL
Qty: 2 TAB | Refills: 0 | Status: SHIPPED | OUTPATIENT
Start: 2020-12-03 | End: 2021-05-10 | Stop reason: SDUPTHER

## 2020-12-09 ENCOUNTER — HOSPITAL ENCOUNTER (OUTPATIENT)
Dept: RADIOLOGY | Facility: MEDICAL CENTER | Age: 55
End: 2020-12-09
Attending: PSYCHIATRY & NEUROLOGY
Payer: MEDICARE

## 2020-12-09 DIAGNOSIS — E04.2 NONTOXIC MULTINODULAR GOITER: ICD-10-CM

## 2020-12-09 DIAGNOSIS — G35 MULTIPLE SCLEROSIS (HCC): ICD-10-CM

## 2020-12-09 PROCEDURE — 76536 US EXAM OF HEAD AND NECK: CPT

## 2021-03-15 DIAGNOSIS — Z23 NEED FOR VACCINATION: ICD-10-CM

## 2021-03-17 ENCOUNTER — IMMUNIZATION (OUTPATIENT)
Dept: FAMILY PLANNING/WOMEN'S HEALTH CLINIC | Facility: IMMUNIZATION CENTER | Age: 56
End: 2021-03-17
Attending: INTERNAL MEDICINE
Payer: MEDICARE

## 2021-03-17 DIAGNOSIS — Z23 NEED FOR VACCINATION: ICD-10-CM

## 2021-03-17 DIAGNOSIS — Z23 ENCOUNTER FOR VACCINATION: Primary | ICD-10-CM

## 2021-03-17 PROCEDURE — 91300 PFIZER SARS-COV-2 VACCINE: CPT

## 2021-03-17 PROCEDURE — 0001A PFIZER SARS-COV-2 VACCINE: CPT

## 2021-04-08 ENCOUNTER — IMMUNIZATION (OUTPATIENT)
Dept: FAMILY PLANNING/WOMEN'S HEALTH CLINIC | Facility: IMMUNIZATION CENTER | Age: 56
End: 2021-04-08
Attending: INTERNAL MEDICINE
Payer: MEDICARE

## 2021-04-08 DIAGNOSIS — Z23 ENCOUNTER FOR VACCINATION: Primary | ICD-10-CM

## 2021-04-08 PROCEDURE — 0002A PFIZER SARS-COV-2 VACCINE: CPT

## 2021-04-08 PROCEDURE — 91300 PFIZER SARS-COV-2 VACCINE: CPT

## 2021-05-07 DIAGNOSIS — F32.5 MAJOR DEPRESSIVE DISORDER WITH SINGLE EPISODE, IN FULL REMISSION (HCC): ICD-10-CM

## 2021-05-07 DIAGNOSIS — D68.59 PROTEIN S DEFICIENCY (HCC): ICD-10-CM

## 2021-05-09 SDOH — HEALTH STABILITY: MENTAL HEALTH
STRESS IS WHEN SOMEONE FEELS TENSE, NERVOUS, ANXIOUS, OR CAN'T SLEEP AT NIGHT BECAUSE THEIR MIND IS TROUBLED. HOW STRESSED ARE YOU?: NOT AT ALL

## 2021-05-09 SDOH — ECONOMIC STABILITY: HOUSING INSECURITY
IN THE LAST 12 MONTHS, WAS THERE A TIME WHEN YOU DID NOT HAVE A STEADY PLACE TO SLEEP OR SLEPT IN A SHELTER (INCLUDING NOW)?: YES

## 2021-05-09 SDOH — HEALTH STABILITY: PHYSICAL HEALTH: ON AVERAGE, HOW MANY DAYS PER WEEK DO YOU ENGAGE IN MODERATE TO STRENUOUS EXERCISE (LIKE A BRISK WALK)?: 3 DAYS

## 2021-05-09 SDOH — ECONOMIC STABILITY: INCOME INSECURITY: IN THE LAST 12 MONTHS, WAS THERE A TIME WHEN YOU WERE NOT ABLE TO PAY THE MORTGAGE OR RENT ON TIME?: NO

## 2021-05-09 SDOH — ECONOMIC STABILITY: HOUSING INSECURITY: IN THE LAST 12 MONTHS, HOW MANY PLACES HAVE YOU LIVED?: 1

## 2021-05-09 SDOH — HEALTH STABILITY: PHYSICAL HEALTH: ON AVERAGE, HOW MANY MINUTES DO YOU ENGAGE IN EXERCISE AT THIS LEVEL?: 60 MINUTES

## 2021-05-09 SDOH — ECONOMIC STABILITY: TRANSPORTATION INSECURITY
IN THE PAST 12 MONTHS, HAS LACK OF RELIABLE TRANSPORTATION KEPT YOU FROM MEDICAL APPOINTMENTS, MEETINGS, WORK OR FROM GETTING THINGS NEEDED FOR DAILY LIVING?: NO

## 2021-05-09 SDOH — ECONOMIC STABILITY: HOUSING INSECURITY
IN THE LAST 12 MONTHS, WAS THERE A TIME WHEN YOU DID NOT HAVE A STEADY PLACE TO SLEEP OR SLEPT IN A SHELTER (INCLUDING NOW)?: NO

## 2021-05-09 SDOH — ECONOMIC STABILITY: TRANSPORTATION INSECURITY
IN THE PAST 12 MONTHS, HAS THE LACK OF TRANSPORTATION KEPT YOU FROM MEDICAL APPOINTMENTS OR FROM GETTING MEDICATIONS?: NO

## 2021-05-09 ASSESSMENT — SOCIAL DETERMINANTS OF HEALTH (SDOH)
HOW OFTEN DO YOU ATTENT MEETINGS OF THE CLUB OR ORGANIZATION YOU BELONG TO?: NEVER
DO YOU BELONG TO ANY CLUBS OR ORGANIZATIONS SUCH AS CHURCH GROUPS UNIONS, FRATERNAL OR ATHLETIC GROUPS, OR SCHOOL GROUPS?: NO
HOW OFTEN DO YOU HAVE A DRINK CONTAINING ALCOHOL: NEVER
HOW OFTEN DO YOU ATTEND CHURCH OR RELIGIOUS SERVICES?: NEVER
HOW HARD IS IT FOR YOU TO PAY FOR THE VERY BASICS LIKE FOOD, HOUSING, MEDICAL CARE, AND HEATING?: NOT HARD AT ALL
WITHIN THE PAST 12 MONTHS, THE FOOD YOU BOUGHT JUST DIDN'T LAST AND YOU DIDN'T HAVE MONEY TO GET MORE: NEVER TRUE
HOW OFTEN DO YOU GET TOGETHER WITH FRIENDS OR RELATIVES?: THREE TIMES A WEEK
HOW OFTEN DO YOU HAVE SIX OR MORE DRINKS ON ONE OCCASION: NEVER
IN A TYPICAL WEEK, HOW MANY TIMES DO YOU TALK ON THE PHONE WITH FAMILY, FRIENDS, OR NEIGHBORS?: MORE THAN THREE TIMES A WEEK
WITHIN THE PAST 12 MONTHS, YOU WORRIED THAT YOUR FOOD WOULD RUN OUT BEFORE YOU GOT THE MONEY TO BUY MORE: NEVER TRUE
HOW MANY DRINKS CONTAINING ALCOHOL DO YOU HAVE ON A TYPICAL DAY WHEN YOU ARE DRINKING: 1 OR 2

## 2021-05-10 ENCOUNTER — OFFICE VISIT (OUTPATIENT)
Dept: MEDICAL GROUP | Facility: PHYSICIAN GROUP | Age: 56
End: 2021-05-10
Payer: MEDICARE

## 2021-05-10 VITALS
HEIGHT: 70 IN | WEIGHT: 206 LBS | DIASTOLIC BLOOD PRESSURE: 74 MMHG | HEART RATE: 68 BPM | OXYGEN SATURATION: 98 % | TEMPERATURE: 98.1 F | SYSTOLIC BLOOD PRESSURE: 122 MMHG | BODY MASS INDEX: 29.49 KG/M2

## 2021-05-10 DIAGNOSIS — Z00.00 ROUTINE ADULT HEALTH MAINTENANCE: ICD-10-CM

## 2021-05-10 DIAGNOSIS — D68.59 PROTEIN S DEFICIENCY (HCC): ICD-10-CM

## 2021-05-10 DIAGNOSIS — E78.5 HYPERLIPIDEMIA, UNSPECIFIED HYPERLIPIDEMIA TYPE: ICD-10-CM

## 2021-05-10 DIAGNOSIS — B37.9 YEAST INFECTION: ICD-10-CM

## 2021-05-10 DIAGNOSIS — G35 MULTIPLE SCLEROSIS (HCC): ICD-10-CM

## 2021-05-10 DIAGNOSIS — R74.8 ELEVATED ALKALINE PHOSPHATASE LEVEL: ICD-10-CM

## 2021-05-10 DIAGNOSIS — L20.9 ATOPIC DERMATITIS, UNSPECIFIED TYPE: ICD-10-CM

## 2021-05-10 DIAGNOSIS — R73.01 IMPAIRED FASTING GLUCOSE: ICD-10-CM

## 2021-05-10 DIAGNOSIS — Z00.00 MEDICARE ANNUAL WELLNESS VISIT, SUBSEQUENT: Primary | ICD-10-CM

## 2021-05-10 DIAGNOSIS — F32.5 MAJOR DEPRESSIVE DISORDER WITH SINGLE EPISODE, IN FULL REMISSION (HCC): ICD-10-CM

## 2021-05-10 DIAGNOSIS — Z12.31 ENCOUNTER FOR SCREENING MAMMOGRAM FOR BREAST CANCER: ICD-10-CM

## 2021-05-10 PROCEDURE — G0439 PPPS, SUBSEQ VISIT: HCPCS | Performed by: NURSE PRACTITIONER

## 2021-05-10 RX ORDER — CLOTRIMAZOLE AND BETAMETHASONE DIPROPIONATE 10; .64 MG/G; MG/G
1 CREAM TOPICAL 2 TIMES DAILY
Qty: 15 G | Refills: 1 | Status: SHIPPED | OUTPATIENT
Start: 2021-05-10 | End: 2022-06-02

## 2021-05-10 RX ORDER — FLUCONAZOLE 150 MG/1
150 TABLET ORAL
Qty: 2 TABLET | Refills: 2 | Status: SHIPPED | OUTPATIENT
Start: 2021-05-10 | End: 2022-09-14

## 2021-05-10 ASSESSMENT — ENCOUNTER SYMPTOMS: GENERAL WELL-BEING: EXCELLENT

## 2021-05-10 ASSESSMENT — PATIENT HEALTH QUESTIONNAIRE - PHQ9
5. POOR APPETITE OR OVEREATING: SEVERAL DAYS
1. LITTLE INTEREST OR PLEASURE IN DOING THINGS: NOT AT ALL
9. THOUGHTS THAT YOU WOULD BE BETTER OFF DEAD, OR OF HURTING YOURSELF: NOT AT ALL
7. TROUBLE CONCENTRATING ON THINGS, SUCH AS READING THE NEWSPAPER OR WATCHING TELEVISION: NOT AT ALL
3. TROUBLE FALLING OR STAYING ASLEEP OR SLEEPING TOO MUCH: SEVERAL DAYS
SUM OF ALL RESPONSES TO PHQ9 QUESTIONS 1 AND 2: 0
CLINICAL INTERPRETATION OF PHQ2 SCORE: 0
SUM OF ALL RESPONSES TO PHQ QUESTIONS 1-9: 3
4. FEELING TIRED OR HAVING LITTLE ENERGY: SEVERAL DAYS
8. MOVING OR SPEAKING SO SLOWLY THAT OTHER PEOPLE COULD HAVE NOTICED. OR THE OPPOSITE, BEING SO FIGETY OR RESTLESS THAT YOU HAVE BEEN MOVING AROUND A LOT MORE THAN USUAL: NOT AT ALL
6. FEELING BAD ABOUT YOURSELF - OR THAT YOU ARE A FAILURE OR HAVE LET YOURSELF OR YOUR FAMILY DOWN: NOT AL ALL
2. FEELING DOWN, DEPRESSED, IRRITABLE, OR HOPELESS: NOT AT ALL

## 2021-05-10 ASSESSMENT — ACTIVITIES OF DAILY LIVING (ADL): BATHING_REQUIRES_ASSISTANCE: 0

## 2021-05-10 ASSESSMENT — FIBROSIS 4 INDEX: FIB4 SCORE: 0.96

## 2021-05-10 NOTE — PROGRESS NOTES
Chief Complaint   Patient presents with   • Annual Wellness Visit         HPI:  Erinn is a 55 y.o. here for Medicare Annual Wellness Visit    Overall she is doing quite well.  Continues to follow with [facility] for her multiple sclerosis.  Takes Tysabri every 4 weeks via infusion.    She has been on Paxil for quite some time and is interested in tapering off.  She has a large supply of 40 mg scored tablets.  We did discuss the short half-life of this medication and common side effects of withdrawal.  Advised slow tapering, taking half doses increasingly more frequently throughout the week, and slowing down if withdrawal symptoms occur.      Patient Active Problem List    Diagnosis Date Noted   • MS (multiple sclerosis) ([facility]) 05/17/2018   • Protein S deficiency ([facility]) 05/17/2018   • Elevated alkaline phosphatase level 02/18/2020   • Post hysterectomy menopause 07/03/2019   • Smoker 01/03/2019   • Hyperlipidemia 01/03/2019   • Major depressive disorder with single episode, in full remission ([facility]) 05/17/2018   • Multiple allergies 05/17/2018   • Obesity (BMI 30-39.9) 05/17/2018       Current Outpatient Medications   Medication Sig Dispense Refill   • rivaroxaban (XARELTO) 20 MG Tab tablet Take 1 tablet by mouth with dinner. 90 tablet 3   • fluconazole (DIFLUCAN) 150 MG tablet Take 1 tablet by mouth every 72 hours. 2 tablet 2   • clotrimazole-betamethasone (LOTRISONE) 1-0.05 % Cream Apply 1 Application topically 2 times a day. 15 g 1   • paroxetine (PAXIL) 40 MG tablet TAKE 1 TABLET BY MOUTH EVERY DAY IN THE EVENING 90 tablet 0   • Biotin 1000 MCG Chew Tab Take 3,000 mcg by mouth every day.     • magnesium oxide (MAG-OX) 400 (241.3 Mg) MG Tab tablet Take 1 Tab by mouth every day. 30 Tab 2   • diphenhydrAMINE (BENADRYL) 25 MG Tab Take 25 mg by mouth at bedtime as needed for Sleep.     • calcium carbonate (TUMS) 500 MG Chew Tab Take 500 mg by mouth every day.     • Pain Pump (PATIENT SUPPLIED) XX LEXI 1  Each by Injection route Continuous. Baclofen 20MCG daily  Bolas 10MCG/ 250MCG per ML  Pt last filled on 10/2018     • docosahexanoic acid (FISH OIL) 1000 MG CAPS Take 1,000 mg by mouth every evening.     • Natalizumab (TYSABRI IV) 1 Dose by Intravenous route Q 4 Weeks.     • vitamin D (CHOLECALCIFEROL) 1000 UNIT TABS Take 1,000 Units by mouth 2 Times a Day.       No current facility-administered medications for this visit.        Patient is taking medications as noted in medication list.  Current supplements as per medication list.     Allergies: Nubain [nalbuphine hcl]    Current social contact/activities: Sees her family routinely    Is patient current with immunizations? No, due for SHINGRIX (Shingles). Patient is interested in receiving NONE today.    She  reports that she has been smoking cigarettes. She has a 20.00 pack-year smoking history. She has never used smokeless tobacco. She reports that she does not drink alcohol and does not use drugs.  Ready to quit: Not Answered  Counseling given: Not Answered  Comment: 3-5 cigs/day, trying to quit        DPA/Advanced directive: Patient does not have an Advanced Directive.  A packet and workshop information was given on Advanced Directives.    ROS:    Gait: Uses no assistive device   Ostomy: No   Other tubes: No   Amputations: No   Chronic oxygen use No   Last eye exam 2019   Wears hearing aids: No   : Denies any urinary leakage during the last 6 months      Screening:        Depression Screening    Little interest or pleasure in doing things?  0 - not at all  Feeling down, depressed, or hopeless? 0 - not at all  Patient Health Questionnaire Score: 0    If depressive symptoms identified deferred to follow up visit unless specifically addressed in assessment and plan.    Interpretation of PHQ-9 Total Score   Score Severity   1-4 No Depression   5-9 Mild Depression   10-14 Moderate Depression   15-19 Moderately Severe Depression   20-27 Severe  Depression    Screening for Cognitive Impairment    Three Minute Recall (captain, garden, picture)  2/3    Jorje clock face with all 12 numbers and set the hands to show 5 past 8.  Yes    If cognitive concerns identified, deferred for follow up unless specifically addressed in assessment and plan.    Fall Risk Assessment    Has the patient had two or more falls in the last year or any fall with injury in the last year?  No  If fall risk identified, deferred for follow up unless specifically addressed in assessment and plan.    Safety Assessment    Throw rugs on floor.  No  Handrails on all stairs.  Yes  Good lighting in all hallways.  Yes  Difficulty hearing.  No  Patient counseled about all safety risks that were identified.    Functional Assessment ADLs    Are there any barriers preventing you from cooking for yourself or meeting nutritional needs?  No.    Are there any barriers preventing you from driving safely or obtaining transportation?  No.    Are there any barriers preventing you from using a telephone or calling for help?  No.    Are there any barriers preventing you from shopping?  No.    Are there any barriers preventing you from taking care of your own finances?  No.    Are there any barriers preventing you from managing your medications?  No.    Are there any barriers preventing you from showering, bathing or dressing yourself?  No.    Are you currently engaging in any exercise or physical activity?  Yes.  Pt walks   What is your perception of your health?  Excellent.    Health Maintenance Summary                IMM ZOSTER VACCINES Overdue 7/31/2015     MAMMOGRAM Overdue 3/7/2020      Done 3/7/2019 MA-SCREENING MAMMO BILAT W/TOMOSYNTHESIS W/CAD     Patient has more history with this topic...    PAP SMEAR Next Due 9/26/2021      Done 9/26/2018 PATHOLOGY GYN SPECIMEN    IMM DTaP/Tdap/Td Vaccine Next Due 10/5/2022      Done 10/5/2012 Imm Admin: Tdap Vaccine    COLONOSCOPY Next Due 11/8/2027      Done  11/8/2017 REFERRAL TO GI FOR COLONOSCOPY          Patient Care Team:  Dea Graham D.O. as PCP - General (Family Medicine)  [facility]  Erna Skinner M.D. as Consulting Physician (Hematology Oncology)  Santo aWtts M.D. as Consulting Physician (Gynecologic Oncology)  Isaak Magana M.D. (Neurology)  Shelby Moyer, [facility] (Inactive) as      Social History     Tobacco Use   • Smoking status: Current Every Day Smoker     Packs/day: 0.50     Years: 40.00     Pack years: 20.00     Types: Cigarettes   • Smokeless tobacco: Never Used   • Tobacco comment: 3-5 cigs/day, trying to quit   Substance Use Topics   • Alcohol use: No   • Drug use: No     Family History   Problem Relation Age of Onset   • Hypertension Other    • No Known Problems Mother    • Lung Disease Father         emphysema   • No Known Problems Sister    • No Known Problems Child    • Cancer Neg Hx    • Diabetes Neg Hx      She  has a past medical history of Anesthesia, Hyperlipidemia (1/3/2019), Indigestion (1/3/2019), Major depressive disorder with single episode, in full remission (HCC) (5/17/2018), MS (multiple sclerosis) ([facility]), Protein S deficiency ([facility]), and Smoker (1/3/2019).   Past Surgical History:   Procedure Laterality Date   • HYSTERECTOMY ROBOTIC XI N/A 11/15/2018    Procedure: HYSTERECTOMY ROBOTIC XI;  Surgeon: Santo Watts M.D.;  Location: [facility];  Service: Gynecology   • SALPINGECTOMY Bilateral 11/15/2018    Procedure: SALPINGECTOMY;  Surgeon: Santo Watts M.D.;  Location: [facility];  Service: Gynecology   • OOPHORECTOMY Bilateral 11/15/2018    Procedure: OOPHORECTOMY;  Surgeon: Santo Watts M.D.;  Location: [facility];  Service: Gynecology   • OTHER  2016    IVC filter taken out   • PUMP INSERT/REMOVE  10/19/2010    Performed by S[facility]MER [facility]ES at [facility]   • PUMP INSERT/REMOVE  8/24/2010    Performed by  "SOMMER PAREDES at [facility]ERY [facility]   • OTHER  2010    IVC filter insertion           Exam:     /74 (BP Location: Right arm, Patient Position: Sitting, BP Cuff Size: Large adult)   Pulse 68   Temp 36.7 °C (98.1 °F) (Temporal)   Ht 1.765 m (5' 9.5\")   Wt 93.4 kg (206 lb)   SpO2 98%  Body mass index is 29.98 kg/m².    Hearing excellent.    Dentition fair  Alert, oriented in no acute distress.  Eye contact is good, speech goal directed, affect calm      Assessment and Plan. The following treatment and monitoring plan is recommended:    1. Medicare annual wellness visit, subsequent  rivaroxaban (XARELTO) 20 MG Tab tablet    CBC WITH DIFFERENTIAL    Comp Metabolic Panel    Lipid Profile    VITAMIN D,25 HYDROXY    HEMOGLOBIN A1C    Prothrombin Time   2. Routine adult health maintenance  [id number]    rivaroxaban (XARELTO) 20 MG Tab tablet    CBC WITH DIFFERENTIAL    Comp Metabolic Panel    Lipid Profile    VITAMIN D,25 HYDROXY    HEMOGLOBIN A1C    Prothrombin Time   3. Encounter for screening mammogram for breast cancer  [id number]   4. Protein S deficiency (HCC)  rivaroxaban (XARELTO) 20 MG Tab tablet    CBC WITH DIFFERENTIAL    Prothrombin Time    Stable, requesting PT to be done, on Xarelto.   5. Multiple sclerosis (HCC)  CBC WITH DIFFERENTIAL    Comp Metabolic Panel    Lipid Profile    VITAMIN D,25 HYDROXY    HEMOGLOBIN A1C    Prothrombin Time    Continues to follow with [facility]; monthly tysabri infusions.    6. Major depressive disorder with single episode, in full remission (HCC)      Stable on current regimen, plans to taper Paxil.  Just recently got a 40 mg 90-day fill, states they are scored so she is going to intermittently take half.   7. Hyperlipidemia, unspecified hyperlipidemia type  Lipid Profile    HEMOGLOBIN A1C    Updated labs indicated and ordered, advised they are fasting   8. Elevated alkaline phosphatase level  Comp Metabolic Panel    " HEMOGLOBIN A1C    Follow-up labs indicated to evaluate trending, if persistently high consider isoenzymes   9. Impaired fasting glucose  Comp Metabolic Panel    HEMOGLOBIN A1C    Follow-up labs ordered   10. Yeast infection  fluconazole (DIFLUCAN) 150 MG tablet    clotrimazole-betamethasone (LOTRISONE) 1-0.05 % Cream    Has upcoming dental procedure for which she will take antibiotics.  Frequently gets vaginal yeast infection afterwards, 2-day course of Diflucan provided   11. Atopic dermatitis, unspecified type  clotrimazole-betamethasone (LOTRISONE) 1-0.05 % Cream    Topical antifungal/corticosteroid per may be used sparingly at itchy sites surrounding skin folds.  Advised against use for greater than 1 week         Services suggested: No services needed at this time  Health Care Screening recommendations as per orders if indicated.  Referrals offered: PT/OT/Nutrition counseling/Behavioral Health/Smoking cessation as per orders if indicated.    Discussion today about general wellness and lifestyle habits:    · Prevent falls and reduce trip hazards; Cautioned about securing or removing rugs.  · Have a working fire alarm and carbon monoxide detector;   · Engage in regular physical activity and social activities

## 2021-05-11 RX ORDER — FLUCONAZOLE 150 MG/1
TABLET ORAL
Qty: 2 TABLET | Refills: 0 | OUTPATIENT
Start: 2021-05-11

## 2021-05-11 RX ORDER — PAROXETINE HYDROCHLORIDE 40 MG/1
TABLET, FILM COATED ORAL
Refills: 0 | OUTPATIENT
Start: 2021-05-11

## 2021-05-11 RX ORDER — RIVAROXABAN 20 MG/1
TABLET, FILM COATED ORAL
Qty: 30 TABLET | Refills: 0 | OUTPATIENT
Start: 2021-05-11

## 2021-05-18 ENCOUNTER — PATIENT MESSAGE (OUTPATIENT)
Dept: HEALTH INFORMATION MANAGEMENT | Facility: OTHER | Age: 56
End: 2021-05-18

## 2021-05-18 ENCOUNTER — HOSPITAL ENCOUNTER (OUTPATIENT)
Dept: LAB | Facility: MEDICAL CENTER | Age: 56
End: 2021-05-18
Attending: NURSE PRACTITIONER
Payer: MEDICARE

## 2021-05-18 DIAGNOSIS — G35 MULTIPLE SCLEROSIS (HCC): ICD-10-CM

## 2021-05-18 DIAGNOSIS — R73.01 IMPAIRED FASTING GLUCOSE: ICD-10-CM

## 2021-05-18 DIAGNOSIS — Z00.00 ROUTINE ADULT HEALTH MAINTENANCE: ICD-10-CM

## 2021-05-18 DIAGNOSIS — D68.59 PROTEIN S DEFICIENCY (HCC): ICD-10-CM

## 2021-05-18 DIAGNOSIS — E78.5 HYPERLIPIDEMIA, UNSPECIFIED HYPERLIPIDEMIA TYPE: ICD-10-CM

## 2021-05-18 DIAGNOSIS — Z00.00 MEDICARE ANNUAL WELLNESS VISIT, SUBSEQUENT: ICD-10-CM

## 2021-05-18 DIAGNOSIS — R74.8 ELEVATED ALKALINE PHOSPHATASE LEVEL: ICD-10-CM

## 2021-05-18 LAB
ALBUMIN SERPL BCP-MCNC: 4.2 G/DL (ref 3.2–4.9)
ALBUMIN/GLOB SERPL: 1.6 G/DL
ALP SERPL-CCNC: 119 U/L (ref 30–99)
ALT SERPL-CCNC: 14 U/L (ref 2–50)
ANION GAP SERPL CALC-SCNC: 7 MMOL/L (ref 7–16)
AST SERPL-CCNC: 17 U/L (ref 12–45)
BASOPHILS # BLD AUTO: 0.8 % (ref 0–1.8)
BASOPHILS # BLD: 0.09 K/UL (ref 0–0.12)
BILIRUB SERPL-MCNC: 0.4 MG/DL (ref 0.1–1.5)
BUN SERPL-MCNC: 15 MG/DL (ref 8–22)
BURR CELLS BLD QL SMEAR: NORMAL
CALCIUM SERPL-MCNC: 9.7 MG/DL (ref 8.5–10.5)
CHLORIDE SERPL-SCNC: 109 MMOL/L (ref 96–112)
CHOLEST SERPL-MCNC: 184 MG/DL (ref 100–199)
CO2 SERPL-SCNC: 26 MMOL/L (ref 20–33)
CREAT SERPL-MCNC: 0.83 MG/DL (ref 0.5–1.4)
EOSINOPHIL # BLD AUTO: 0.37 K/UL (ref 0–0.51)
EOSINOPHIL NFR BLD: 3.4 % (ref 0–6.9)
ERYTHROCYTE [DISTWIDTH] IN BLOOD BY AUTOMATED COUNT: 50.7 FL (ref 35.9–50)
EST. AVERAGE GLUCOSE BLD GHB EST-MCNC: 105 MG/DL
FASTING STATUS PATIENT QL REPORTED: NORMAL
GLOBULIN SER CALC-MCNC: 2.7 G/DL (ref 1.9–3.5)
GLUCOSE SERPL-MCNC: 79 MG/DL (ref 65–99)
HBA1C MFR BLD: 5.3 % (ref 4–5.6)
HCT VFR BLD AUTO: 46.6 % (ref 37–47)
HDLC SERPL-MCNC: 40 MG/DL
HGB BLD-MCNC: 15.4 G/DL (ref 12–16)
INR PPP: 1.06 (ref 0.87–1.13)
LDLC SERPL CALC-MCNC: 121 MG/DL
LYMPHOCYTES # BLD AUTO: 5.31 K/UL (ref 1–4.8)
LYMPHOCYTES NFR BLD: 48.7 % (ref 22–41)
MANUAL DIFF BLD: NORMAL
MCH RBC QN AUTO: 31.9 PG (ref 27–33)
MCHC RBC AUTO-ENTMCNC: 33 G/DL (ref 33.6–35)
MCV RBC AUTO: 96.5 FL (ref 81.4–97.8)
MONOCYTES # BLD AUTO: 0.92 K/UL (ref 0–0.85)
MONOCYTES NFR BLD AUTO: 8.4 % (ref 0–13.4)
MORPHOLOGY BLD-IMP: NORMAL
NEUTROPHILS # BLD AUTO: 4.22 K/UL (ref 2–7.15)
NEUTROPHILS NFR BLD: 38.7 % (ref 44–72)
NRBC # BLD AUTO: 0 K/UL
NRBC BLD-RTO: 0 /100 WBC
PLATELET # BLD AUTO: 248 K/UL (ref 164–446)
PLATELET BLD QL SMEAR: NORMAL
PMV BLD AUTO: 12 FL (ref 9–12.9)
POIKILOCYTOSIS BLD QL SMEAR: NORMAL
POTASSIUM SERPL-SCNC: 4.3 MMOL/L (ref 3.6–5.5)
PROT SERPL-MCNC: 6.9 G/DL (ref 6–8.2)
PROTHROMBIN TIME: 14.1 SEC (ref 12–14.6)
RBC # BLD AUTO: 4.83 M/UL (ref 4.2–5.4)
RBC BLD AUTO: PRESENT
SODIUM SERPL-SCNC: 142 MMOL/L (ref 135–145)
TRIGL SERPL-MCNC: 116 MG/DL (ref 0–149)
WBC # BLD AUTO: 10.9 K/UL (ref 4.8–10.8)

## 2021-05-18 PROCEDURE — 82306 VITAMIN D 25 HYDROXY: CPT

## 2021-05-18 PROCEDURE — 85610 PROTHROMBIN TIME: CPT

## 2021-05-18 PROCEDURE — 85027 COMPLETE CBC AUTOMATED: CPT

## 2021-05-18 PROCEDURE — 83036 HEMOGLOBIN GLYCOSYLATED A1C: CPT

## 2021-05-18 PROCEDURE — 80053 COMPREHEN METABOLIC PANEL: CPT

## 2021-05-18 PROCEDURE — 85007 BL SMEAR W/DIFF WBC COUNT: CPT

## 2021-05-18 PROCEDURE — 36415 COLL VENOUS BLD VENIPUNCTURE: CPT

## 2021-05-18 PROCEDURE — 80061 LIPID PANEL: CPT

## 2021-05-20 LAB — 25(OH)D3 SERPL-MCNC: 27 NG/ML (ref 30–80)

## 2021-05-28 ENCOUNTER — PATIENT OUTREACH (OUTPATIENT)
Dept: HEALTH INFORMATION MANAGEMENT | Facility: OTHER | Age: 56
End: 2021-05-28

## 2021-07-27 ENCOUNTER — HOSPITAL ENCOUNTER (OUTPATIENT)
Dept: RADIOLOGY | Facility: MEDICAL CENTER | Age: 56
End: 2021-07-27
Attending: NURSE PRACTITIONER
Payer: MEDICARE

## 2021-07-27 DIAGNOSIS — Z00.00 ROUTINE ADULT HEALTH MAINTENANCE: ICD-10-CM

## 2021-07-27 DIAGNOSIS — Z12.31 ENCOUNTER FOR SCREENING MAMMOGRAM FOR BREAST CANCER: ICD-10-CM

## 2021-07-27 PROCEDURE — 77063 BREAST TOMOSYNTHESIS BI: CPT

## 2021-08-31 DIAGNOSIS — F32.5 MAJOR DEPRESSIVE DISORDER WITH SINGLE EPISODE, IN FULL REMISSION (HCC): ICD-10-CM

## 2021-08-31 RX ORDER — PAROXETINE HYDROCHLORIDE 40 MG/1
40 TABLET, FILM COATED ORAL
Qty: 90 TABLET | Refills: 1 | Status: SHIPPED | OUTPATIENT
Start: 2021-08-31 | End: 2022-03-01 | Stop reason: SDUPTHER

## 2022-01-07 ENCOUNTER — HOSPITAL ENCOUNTER (OUTPATIENT)
Dept: RADIOLOGY | Facility: MEDICAL CENTER | Age: 57
End: 2022-01-07
Attending: PSYCHIATRY & NEUROLOGY
Payer: MEDICARE

## 2022-01-07 DIAGNOSIS — G35 MULTIPLE SCLEROSIS (HCC): ICD-10-CM

## 2022-01-07 PROCEDURE — 72156 MRI NECK SPINE W/O & W/DYE: CPT | Mod: MH

## 2022-01-07 PROCEDURE — 70553 MRI BRAIN STEM W/O & W/DYE: CPT | Mod: MH

## 2022-01-07 PROCEDURE — 700117 HCHG RX CONTRAST REV CODE 255: Performed by: PSYCHIATRY & NEUROLOGY

## 2022-01-07 PROCEDURE — A9576 INJ PROHANCE MULTIPACK: HCPCS | Performed by: PSYCHIATRY & NEUROLOGY

## 2022-01-07 RX ADMIN — GADOTERIDOL 18 ML: 279.3 INJECTION, SOLUTION INTRAVENOUS at 10:55

## 2022-02-25 ENCOUNTER — TELEPHONE (OUTPATIENT)
Dept: MEDICAL GROUP | Facility: PHYSICIAN GROUP | Age: 57
End: 2022-02-25
Payer: MEDICARE

## 2022-02-25 ENCOUNTER — PATIENT MESSAGE (OUTPATIENT)
Dept: HEALTH INFORMATION MANAGEMENT | Facility: OTHER | Age: 57
End: 2022-02-25
Payer: MEDICARE

## 2022-02-25 NOTE — TELEPHONE ENCOUNTER
ESTABLISHED PATIENT PRE-VISIT PLANNING     Patient was NOT contacted to complete PVP.     Note: Patient will not be contacted if there is no indication to call.     1.  Reviewed notes from the last few office visits within the medical group: Yes    2.  If any orders were placed at last visit or intended to be done for this visit (i.e. 6 mos follow-up), do we have Results/Consult Notes?         •  Labs - Labs ordered, completed on 5/18/21 and results are in chart.  Note: If patient appointment is for lab review and patient did not complete labs, check with provider if OK to reschedule patient until labs completed.       •  Imaging - Imaging was not ordered at last office visit.       •  Referrals - No referrals were ordered at last office visit.    3. Is this appointment scheduled as a Hospital Follow-Up? No    4.  Immunizations were updated in Epic using Reconcile Outside Information activity? Yes    5.  Patient is due for the following Health Maintenance Topics:   Health Maintenance Due   Topic Date Due   • IMM ZOSTER VACCINES (1 of 2) Never done   • PAP SMEAR  09/26/2021       6.  AHA (Pulse8) form printed for Provider? Yes

## 2022-03-01 DIAGNOSIS — F32.5 MAJOR DEPRESSIVE DISORDER WITH SINGLE EPISODE, IN FULL REMISSION (HCC): ICD-10-CM

## 2022-03-01 SDOH — ECONOMIC STABILITY: INCOME INSECURITY: HOW HARD IS IT FOR YOU TO PAY FOR THE VERY BASICS LIKE FOOD, HOUSING, MEDICAL CARE, AND HEATING?: NOT VERY HARD

## 2022-03-01 SDOH — ECONOMIC STABILITY: HOUSING INSECURITY: IN THE LAST 12 MONTHS, HOW MANY PLACES HAVE YOU LIVED?: 1

## 2022-03-01 SDOH — ECONOMIC STABILITY: FOOD INSECURITY: WITHIN THE PAST 12 MONTHS, THE FOOD YOU BOUGHT JUST DIDN'T LAST AND YOU DIDN'T HAVE MONEY TO GET MORE.: NEVER TRUE

## 2022-03-01 SDOH — ECONOMIC STABILITY: INCOME INSECURITY: IN THE LAST 12 MONTHS, WAS THERE A TIME WHEN YOU WERE NOT ABLE TO PAY THE MORTGAGE OR RENT ON TIME?: NO

## 2022-03-01 SDOH — HEALTH STABILITY: PHYSICAL HEALTH: ON AVERAGE, HOW MANY DAYS PER WEEK DO YOU ENGAGE IN MODERATE TO STRENUOUS EXERCISE (LIKE A BRISK WALK)?: 3 DAYS

## 2022-03-01 SDOH — HEALTH STABILITY: PHYSICAL HEALTH: ON AVERAGE, HOW MANY MINUTES DO YOU ENGAGE IN EXERCISE AT THIS LEVEL?: 20 MIN

## 2022-03-01 SDOH — ECONOMIC STABILITY: TRANSPORTATION INSECURITY
IN THE PAST 12 MONTHS, HAS LACK OF TRANSPORTATION KEPT YOU FROM MEETINGS, WORK, OR FROM GETTING THINGS NEEDED FOR DAILY LIVING?: NO

## 2022-03-01 SDOH — ECONOMIC STABILITY: FOOD INSECURITY: WITHIN THE PAST 12 MONTHS, YOU WORRIED THAT YOUR FOOD WOULD RUN OUT BEFORE YOU GOT MONEY TO BUY MORE.: NEVER TRUE

## 2022-03-01 SDOH — HEALTH STABILITY: MENTAL HEALTH
STRESS IS WHEN SOMEONE FEELS TENSE, NERVOUS, ANXIOUS, OR CAN'T SLEEP AT NIGHT BECAUSE THEIR MIND IS TROUBLED. HOW STRESSED ARE YOU?: ONLY A LITTLE

## 2022-03-01 ASSESSMENT — LIFESTYLE VARIABLES
HOW OFTEN DO YOU HAVE A DRINK CONTAINING ALCOHOL: NEVER
HOW OFTEN DO YOU HAVE SIX OR MORE DRINKS ON ONE OCCASION: NEVER
HOW MANY STANDARD DRINKS CONTAINING ALCOHOL DO YOU HAVE ON A TYPICAL DAY: PATIENT DECLINED

## 2022-03-01 ASSESSMENT — SOCIAL DETERMINANTS OF HEALTH (SDOH)
IN A TYPICAL WEEK, HOW MANY TIMES DO YOU TALK ON THE PHONE WITH FAMILY, FRIENDS, OR NEIGHBORS?: THREE TIMES A WEEK
DO YOU BELONG TO ANY CLUBS OR ORGANIZATIONS SUCH AS CHURCH GROUPS UNIONS, FRATERNAL OR ATHLETIC GROUPS, OR SCHOOL GROUPS?: NO
DO YOU BELONG TO ANY CLUBS OR ORGANIZATIONS SUCH AS CHURCH GROUPS UNIONS, FRATERNAL OR ATHLETIC GROUPS, OR SCHOOL GROUPS?: NO
WITHIN THE PAST 12 MONTHS, YOU WORRIED THAT YOUR FOOD WOULD RUN OUT BEFORE YOU GOT THE MONEY TO BUY MORE: NEVER TRUE
HOW OFTEN DO YOU GET TOGETHER WITH FRIENDS OR RELATIVES?: ONCE A WEEK
IN A TYPICAL WEEK, HOW MANY TIMES DO YOU TALK ON THE PHONE WITH FAMILY, FRIENDS, OR NEIGHBORS?: THREE TIMES A WEEK
HOW OFTEN DO YOU ATTEND CHURCH OR RELIGIOUS SERVICES?: NEVER
HOW OFTEN DO YOU GET TOGETHER WITH FRIENDS OR RELATIVES?: ONCE A WEEK
HOW OFTEN DO YOU ATTENT MEETINGS OF THE CLUB OR ORGANIZATION YOU BELONG TO?: 1 TO 4 TIMES PER YEAR
HOW MANY DRINKS CONTAINING ALCOHOL DO YOU HAVE ON A TYPICAL DAY WHEN YOU ARE DRINKING: PATIENT DECLINED
HOW HARD IS IT FOR YOU TO PAY FOR THE VERY BASICS LIKE FOOD, HOUSING, MEDICAL CARE, AND HEATING?: NOT VERY HARD
HOW OFTEN DO YOU ATTENT MEETINGS OF THE CLUB OR ORGANIZATION YOU BELONG TO?: 1 TO 4 TIMES PER YEAR
HOW OFTEN DO YOU ATTEND CHURCH OR RELIGIOUS SERVICES?: NEVER
HOW OFTEN DO YOU HAVE SIX OR MORE DRINKS ON ONE OCCASION: NEVER
HOW OFTEN DO YOU HAVE A DRINK CONTAINING ALCOHOL: NEVER

## 2022-03-02 ENCOUNTER — OFFICE VISIT (OUTPATIENT)
Dept: MEDICAL GROUP | Facility: PHYSICIAN GROUP | Age: 57
End: 2022-03-02
Payer: MEDICARE

## 2022-03-02 ENCOUNTER — HOSPITAL ENCOUNTER (OUTPATIENT)
Facility: MEDICAL CENTER | Age: 57
End: 2022-03-02
Attending: STUDENT IN AN ORGANIZED HEALTH CARE EDUCATION/TRAINING PROGRAM
Payer: MEDICARE

## 2022-03-02 VITALS
BODY MASS INDEX: 29.77 KG/M2 | WEIGHT: 201 LBS | SYSTOLIC BLOOD PRESSURE: 122 MMHG | OXYGEN SATURATION: 94 % | DIASTOLIC BLOOD PRESSURE: 82 MMHG | TEMPERATURE: 98.5 F | HEIGHT: 69 IN | HEART RATE: 85 BPM

## 2022-03-02 DIAGNOSIS — M81.0 AGE-RELATED OSTEOPOROSIS WITHOUT CURRENT PATHOLOGICAL FRACTURE: ICD-10-CM

## 2022-03-02 DIAGNOSIS — R09.89 CHEST CONGESTION: ICD-10-CM

## 2022-03-02 DIAGNOSIS — Z76.89 ENCOUNTER TO ESTABLISH CARE: ICD-10-CM

## 2022-03-02 DIAGNOSIS — F17.200 SMOKER: ICD-10-CM

## 2022-03-02 DIAGNOSIS — I70.0 AORTIC ATHEROSCLEROSIS (HCC): ICD-10-CM

## 2022-03-02 DIAGNOSIS — F32.5 MAJOR DEPRESSIVE DISORDER WITH SINGLE EPISODE, IN FULL REMISSION (HCC): ICD-10-CM

## 2022-03-02 DIAGNOSIS — J02.9 SORE THROAT: ICD-10-CM

## 2022-03-02 DIAGNOSIS — E04.2 MULTIPLE THYROID NODULES: ICD-10-CM

## 2022-03-02 DIAGNOSIS — R06.2 WHEEZING: ICD-10-CM

## 2022-03-02 DIAGNOSIS — R05.9 COUGH: ICD-10-CM

## 2022-03-02 DIAGNOSIS — D68.59 PROTEIN S DEFICIENCY (HCC): ICD-10-CM

## 2022-03-02 DIAGNOSIS — Z86.718 HISTORY OF DVT OF LOWER EXTREMITY: ICD-10-CM

## 2022-03-02 DIAGNOSIS — Z86.711 HISTORY OF PULMONARY EMBOLISM: ICD-10-CM

## 2022-03-02 DIAGNOSIS — R74.8 ELEVATED ALKALINE PHOSPHATASE LEVEL: ICD-10-CM

## 2022-03-02 DIAGNOSIS — K52.9 CHRONIC DIARRHEA: ICD-10-CM

## 2022-03-02 DIAGNOSIS — E78.5 HYPERLIPIDEMIA, UNSPECIFIED HYPERLIPIDEMIA TYPE: ICD-10-CM

## 2022-03-02 DIAGNOSIS — E55.9 VITAMIN D DEFICIENCY: ICD-10-CM

## 2022-03-02 DIAGNOSIS — G35 MULTIPLE SCLEROSIS (HCC): ICD-10-CM

## 2022-03-02 PROCEDURE — U0003 INFECTIOUS AGENT DETECTION BY NUCLEIC ACID (DNA OR RNA); SEVERE ACUTE RESPIRATORY SYNDROME CORONAVIRUS 2 (SARS-COV-2) (CORONAVIRUS DISEASE [COVID-19]), AMPLIFIED PROBE TECHNIQUE, MAKING USE OF HIGH THROUGHPUT TECHNOLOGIES AS DESCRIBED BY CMS-2020-01-R: HCPCS

## 2022-03-02 PROCEDURE — U0005 INFEC AGEN DETEC AMPLI PROBE: HCPCS

## 2022-03-02 PROCEDURE — 99214 OFFICE O/P EST MOD 30 MIN: CPT | Mod: CS | Performed by: STUDENT IN AN ORGANIZED HEALTH CARE EDUCATION/TRAINING PROGRAM

## 2022-03-02 RX ORDER — BENZONATATE 100 MG/1
100 CAPSULE ORAL 3 TIMES DAILY PRN
Qty: 60 CAPSULE | Refills: 0 | Status: SHIPPED | OUTPATIENT
Start: 2022-03-02 | End: 2022-06-02

## 2022-03-02 RX ORDER — ALBUTEROL SULFATE 90 UG/1
2 AEROSOL, METERED RESPIRATORY (INHALATION) EVERY 4 HOURS PRN
Qty: 1 EACH | Refills: 0 | Status: SHIPPED | OUTPATIENT
Start: 2022-03-02 | End: 2022-06-02

## 2022-03-02 RX ORDER — PAROXETINE HYDROCHLORIDE 40 MG/1
40 TABLET, FILM COATED ORAL
Qty: 90 TABLET | Refills: 1 | Status: SHIPPED | OUTPATIENT
Start: 2022-03-02 | End: 2022-08-10

## 2022-03-02 ASSESSMENT — PATIENT HEALTH QUESTIONNAIRE - PHQ9: CLINICAL INTERPRETATION OF PHQ2 SCORE: 0

## 2022-03-02 ASSESSMENT — FIBROSIS 4 INDEX: FIB4 SCORE: 1.03

## 2022-03-02 NOTE — PROGRESS NOTES
Subjective:     Chief Complaint   Patient presents with   • Establish Care   • Pharyngitis       cough       HISTORY OF THE PRESENT ILLNESS: Patient is a 56 y.o. female. This pleasant patient is here today to establish care and discuss sore throat/cough. His/her prior PCP was Dr. Graham.    Patient reports she has had mild sore throat, chest congestion and cough for the past week.  Denies any chest pain, hemoptysis or shortness of breath but admits to wheezing which occurs more so at night along with cough that worsens at night as well.  She does continue to smoke, but is working on [employer] back.  Denies any history of chronic lung disease.  No fever, chills, abdominal pain or diarrhea.  No sick contacts.  Patient vaccinated to COVID-19.  She took a home test which was negative, visiting her parents soon so would like a PCR as well.    We review her medical history and current medications.  She reports to have decreased her Paxil from 40 to 20 mg and is still feeling well.  Wonders if she should continue to decrease.    She does report a history of chronic diarrhea without any blood or abdominal pain.    Current Outpatient Medications Ordered in Epic   Medication Sig Dispense Refill   • albuterol 108 (90 Base) MCG/ACT Aero Soln inhalation aerosol Inhale 2 Puffs every four hours as needed for Shortness of Breath (wheezing and cough). 1 Each 0   • benzonatate (TESSALON) 100 MG Cap Take 1 Capsule by mouth 3 times a day as needed for Cough. 60 Capsule 0   • rivaroxaban (XARELTO) 20 MG Tab tablet Take 1 tablet by mouth with dinner. 90 tablet 3   • fluconazole (DIFLUCAN) 150 MG tablet Take 1 tablet by mouth every 72 hours. 2 tablet 2   • clotrimazole-betamethasone (LOTRISONE) 1-0.05 % Cream Apply 1 Application topically 2 times a day. 15 g 1   • magnesium oxide (MAG-OX) 400 (241.3 Mg) MG Tab tablet Take 1 Tab by mouth every day. 30 Tab 2   • diphenhydrAMINE (BENADRYL) 25 MG Tab Take 25 mg by mouth at bedtime as needed  "for Sleep.     • calcium carbonate (TUMS) 500 MG Chew Tab Take 500 mg by mouth every day.     • Pain Pump (PATIENT SUPPLIED) XX LEXI 1 Each by Injection route Continuous. Baclofen 20MCG daily  Bolas 10MCG/ 250MCG per ML  Pt last filled on 10/2018     • docosahexanoic acid (OMEGA 3 FA) 1000 MG Cap Take 1,000 mg by mouth every evening.     • Natalizumab (TYSABRI IV) 1 Dose by Intravenous route Q 4 Weeks.     • vitamin D (CHOLECALCIFEROL) 1000 UNIT TABS Take 1,000 Units by mouth 2 Times a Day.     • paroxetine (PAXIL) 40 MG tablet Take 1 Tablet by mouth every day. 90 Tablet 1   • Biotin 1000 MCG Chew Tab Take 3,000 mcg by mouth every day.       No current [facility]-ordered facility-administered medications on file.     ROS:   Gen: no fevers/chills, no changes in weight  Eyes: no changes in vision  Pulm: no sob  CV: no chest pain, no palpitations  GI: no nausea/vomiting  : no dysuria  Skin: no rash  Neuro: no headaches  Heme/Lymph: no easy bruising      Objective:     Exam: /82 (BP Location: Left arm, Patient Position: Sitting, BP Cuff Size: Adult)   Pulse 85   Temp 36.9 °C (98.5 °F) (Temporal)   Ht 1.753 m (5' 9\")   Wt 91.2 kg (201 lb)   SpO2 94%  Body mass index is 29.68 kg/m².    General: Well developed, well nourished in no acute distress.  Head: Normocephalic and atraumatic.  Eyes: Conjunctivae and extraocular motions are normal. Pupils are equal, round. No scleral icterus.   Ears:  External ears unremarkable. Tympanic membranes clear and intact.  Nose: Nares patent. No discharge.  Mouth/Throat: Oropharynx is clear and moist. Posterior pharynx without erythema or exudates.  Neck: Supple. Thyroid is not enlarged.  Pulmonary: Not coughing during visit.  Normal effort without work of breathing or conversational dyspnea. No rales, ronchi.  Scattered occasional expiratory wheezes.  Good aeration.  Cardiovascular: Regular rate and rhythm without murmur.  Abdomen: Soft, nontender, nondistended. Normal bowel " sounds. No HSM.  Neurologic: No gross/focal deficits. Normal gait.   Lymph: No cervical or supraclavicular lymph nodes are palpable  Skin: Warm and dry.  Hyperpigmented patches on left lower leg.  Musculoskeletal: No extremity cyanosis, clubbing.  Trace pitting edema bilaterally.  Psych: Normal mood and affect. Alert and oriented x3. Judgment and insight is normal.    Labs: Reviewed from 5/18/2021  Imaging: CT abdomen pelvis reviewed 3/24/2019    Assessment & Plan:   56 y.o. female with the following -    1. Encounter to establish care  Medical record reviewed and updated with patient.    2. Sore throat  3. Cough  4. Chest congestion  This is an acute condition.  No evidence of bacterial infection and vitals are stable.  Exam notable as above for mild wheezing with good aeration.  Provided with Tessalon for cough and can also use Robitussin-DM over-the-counter, albuterol as below for wheezing/cough if needed.  Tested for Covid, isolate pending results.  Discussed return precautions.  - SARS-CoV-2, PCR (In-House); Future  - benzonatate (TESSALON) 100 MG Cap; Take 1 Capsule by mouth 3 times a day as needed for Cough.  Dispense: 60 Capsule; Refill: 0    5. Wheezing  Noted on exam today, patient denies any history of chronic lung disease.  Likely due to viral illness as above.  Recommend tobacco cessation.  - albuterol 108 (90 Base) MCG/ACT Aero Soln inhalation aerosol; Inhale 2 Puffs every four hours as needed for Shortness of Breath (wheezing and cough).  Dispense: 1 Each; Refill: 0    6. Smoker  This is a chronic condition, patient working on cessation.  Gave encouragement.    7. MS (multiple sclerosis) ([facility])  This is chronic condition, followed by neurology and stable per patient.  - CBC WITH DIFFERENTIAL; Future    8. Major depressive disorder with single episode, in full remission ([facility])  This is chronic condition, doing well and has even decreased her Paxil from 40 to 20 mg daily.  States that her depression had  worsened when her sister passed away at a younger age in the past few years but is doing well currently.  If she would like she continue to slowly taper if she would like to discontinue the medication.    9. Age-related osteoporosis without current pathological fracture  This was noted in 2019, patient not under treatment.  We will plan to repeat and decide on treatment pending those results.  - DS-BONE DENSITY STUDY (DEXA); Future    10. Elevated alkaline phosphatase level  This is a chronic condition.  I suspect that this may be related to osteoporosis as above.  DEXA scan ordered.  Will evaluate further with isoenzymes.  - ALKALINE PHOSPHATASE ISOENZYMES; Future    11. Vitamin D deficiency  This is a chronic condition, mild.  Will trend with annual labs.  Supplement of 800 to 1000 international units of D3 may be of benefit.  - VITAMIN D,25 HYDROXY; Future    12. Hyperlipidemia, unspecified hyperlipidemia type  13. Aortic atherosclerosis (HCC)  This is a chronic condition, appears improved in May so we will go ahead and trend with annual labs.  She did have mild aortic atherosclerosis noted in 2019 on CT and not mentioned in more recent CT imaging I see that it is essentially unchanged.  Discuss further follow-up.  The 10-year ASCVD risk score (Lesley DC Jr., et al., 2013) is: 5.8%  - Comp Metabolic Panel; Future  - Lipid Profile; Future    14. Multiple thyroid nodules  History of multiple thyroid nodules, reviewed most recent MRI cervical which shows they are stable.  Check TSH annually.  - TSH WITH REFLEX TO FT4; Future    15. Protein S deficiency (HCC)  16. History of pulmonary embolism  17. History of DVT of lower extremity  Patient chronically anticoagulated, doing well-continue.    18. Chronic diarrhea  This is a chronic condition, appears to not be fully evaluated prior so will obtain labs as below and given CT findings prior will refer to gastroenterology for further evaluation to exclude inflammatory bowel  disease.  - CBC WITH DIFFERENTIAL; Future  - Comp Metabolic Panel; Future  - TSH WITH REFLEX TO FT4; Future  - Sed Rate; Future  - CRP QUANTITIVE (NON-CARDIAC); Future  - Referral to Gastroenterology  - Cdiff By PCR Rflx Toxin; Future  - CULTURE STOOL; Future  - CELIAC DISEASE AB PANEL; Future    Return in about 3 months (around 6/2/2022), or if symptoms worsen or fail to improve, for Annual with PAP.    Please note that this dictation was created using voice recognition software. I have made every reasonable attempt to correct obvious errors, but I expect that there are errors of grammar and possibly content that I did not discover before finalizing the note.

## 2022-03-03 DIAGNOSIS — J02.9 SORE THROAT: ICD-10-CM

## 2022-03-03 DIAGNOSIS — R05.9 COUGH: ICD-10-CM

## 2022-03-03 DIAGNOSIS — R09.89 CHEST CONGESTION: ICD-10-CM

## 2022-03-03 PROBLEM — I70.0 AORTIC ATHEROSCLEROSIS (HCC): Status: ACTIVE | Noted: 2022-03-03

## 2022-03-03 LAB
COVID ORDER STATUS COVID19: NORMAL
SARS-COV-2 RNA RESP QL NAA+PROBE: NOTDETECTED
SPECIMEN SOURCE: NORMAL

## 2022-04-19 ENCOUNTER — HOSPITAL ENCOUNTER (OUTPATIENT)
Dept: RADIOLOGY | Facility: MEDICAL CENTER | Age: 57
End: 2022-04-19
Attending: STUDENT IN AN ORGANIZED HEALTH CARE EDUCATION/TRAINING PROGRAM
Payer: MEDICARE

## 2022-04-19 DIAGNOSIS — M81.0 AGE-RELATED OSTEOPOROSIS WITHOUT CURRENT PATHOLOGICAL FRACTURE: ICD-10-CM

## 2022-04-19 PROCEDURE — 77080 DXA BONE DENSITY AXIAL: CPT

## 2022-05-10 ENCOUNTER — HOSPITAL ENCOUNTER (OUTPATIENT)
Dept: LAB | Facility: MEDICAL CENTER | Age: 57
End: 2022-05-10
Attending: STUDENT IN AN ORGANIZED HEALTH CARE EDUCATION/TRAINING PROGRAM
Payer: MEDICARE

## 2022-05-10 DIAGNOSIS — E78.5 HYPERLIPIDEMIA, UNSPECIFIED HYPERLIPIDEMIA TYPE: ICD-10-CM

## 2022-05-10 DIAGNOSIS — G35 MULTIPLE SCLEROSIS (HCC): ICD-10-CM

## 2022-05-10 DIAGNOSIS — K52.9 CHRONIC DIARRHEA: ICD-10-CM

## 2022-05-10 DIAGNOSIS — R74.8 ELEVATED ALKALINE PHOSPHATASE LEVEL: ICD-10-CM

## 2022-05-10 DIAGNOSIS — E04.2 MULTIPLE THYROID NODULES: ICD-10-CM

## 2022-05-10 DIAGNOSIS — E55.9 VITAMIN D DEFICIENCY: ICD-10-CM

## 2022-05-10 LAB
BASOPHILS # BLD AUTO: 0.7 % (ref 0–1.8)
BASOPHILS # BLD: 0.08 K/UL (ref 0–0.12)
EOSINOPHIL # BLD AUTO: 0.44 K/UL (ref 0–0.51)
EOSINOPHIL NFR BLD: 3.9 % (ref 0–6.9)
ERYTHROCYTE [DISTWIDTH] IN BLOOD BY AUTOMATED COUNT: 50.9 FL (ref 35.9–50)
ERYTHROCYTE [SEDIMENTATION RATE] IN BLOOD BY WESTERGREN METHOD: 10 MM/HOUR (ref 0–25)
HCT VFR BLD AUTO: 42.5 % (ref 37–47)
HGB BLD-MCNC: 14.1 G/DL (ref 12–16)
IMM GRANULOCYTES # BLD AUTO: 0.05 K/UL (ref 0–0.11)
IMM GRANULOCYTES NFR BLD AUTO: 0.4 % (ref 0–0.9)
LYMPHOCYTES # BLD AUTO: 3.63 K/UL (ref 1–4.8)
LYMPHOCYTES NFR BLD: 32.3 % (ref 22–41)
MCH RBC QN AUTO: 31.4 PG (ref 27–33)
MCHC RBC AUTO-ENTMCNC: 33.2 G/DL (ref 33.6–35)
MCV RBC AUTO: 94.7 FL (ref 81.4–97.8)
MONOCYTES # BLD AUTO: 0.7 K/UL (ref 0–0.85)
MONOCYTES NFR BLD AUTO: 6.2 % (ref 0–13.4)
NEUTROPHILS # BLD AUTO: 6.35 K/UL (ref 2–7.15)
NEUTROPHILS NFR BLD: 56.5 % (ref 44–72)
NRBC # BLD AUTO: 0.02 K/UL
NRBC BLD-RTO: 0.2 /100 WBC
PLATELET # BLD AUTO: 246 K/UL (ref 164–446)
PMV BLD AUTO: 11.4 FL (ref 9–12.9)
RBC # BLD AUTO: 4.49 M/UL (ref 4.2–5.4)
WBC # BLD AUTO: 11.3 K/UL (ref 4.8–10.8)

## 2022-05-10 PROCEDURE — 82306 VITAMIN D 25 HYDROXY: CPT

## 2022-05-10 PROCEDURE — 82784 ASSAY IGA/IGD/IGG/IGM EACH: CPT

## 2022-05-10 PROCEDURE — 84075 ASSAY ALKALINE PHOSPHATASE: CPT

## 2022-05-10 PROCEDURE — 80053 COMPREHEN METABOLIC PANEL: CPT

## 2022-05-10 PROCEDURE — 80061 LIPID PANEL: CPT

## 2022-05-10 PROCEDURE — 85025 COMPLETE CBC W/AUTO DIFF WBC: CPT

## 2022-05-10 PROCEDURE — 86364 TISS TRNSGLTMNASE EA IG CLAS: CPT

## 2022-05-10 PROCEDURE — 84080 ASSAY ALKALINE PHOSPHATASES: CPT

## 2022-05-10 PROCEDURE — 86140 C-REACTIVE PROTEIN: CPT

## 2022-05-10 PROCEDURE — 84443 ASSAY THYROID STIM HORMONE: CPT

## 2022-05-10 PROCEDURE — 36415 COLL VENOUS BLD VENIPUNCTURE: CPT

## 2022-05-10 PROCEDURE — 85652 RBC SED RATE AUTOMATED: CPT

## 2022-05-11 LAB
25(OH)D3 SERPL-MCNC: 33 NG/ML (ref 30–100)
ALBUMIN SERPL BCP-MCNC: 4.1 G/DL (ref 3.2–4.9)
ALBUMIN/GLOB SERPL: 1.6 G/DL
ALP SERPL-CCNC: 115 U/L (ref 30–99)
ALT SERPL-CCNC: 11 U/L (ref 2–50)
ANION GAP SERPL CALC-SCNC: 10 MMOL/L (ref 7–16)
AST SERPL-CCNC: 15 U/L (ref 12–45)
BILIRUB SERPL-MCNC: 0.4 MG/DL (ref 0.1–1.5)
BUN SERPL-MCNC: 18 MG/DL (ref 8–22)
CALCIUM SERPL-MCNC: 9 MG/DL (ref 8.5–10.5)
CHLORIDE SERPL-SCNC: 110 MMOL/L (ref 96–112)
CHOLEST SERPL-MCNC: 181 MG/DL (ref 100–199)
CO2 SERPL-SCNC: 22 MMOL/L (ref 20–33)
CREAT SERPL-MCNC: 0.81 MG/DL (ref 0.5–1.4)
CRP SERPL HS-MCNC: <0.3 MG/DL (ref 0–0.75)
GFR SERPLBLD CREATININE-BSD FMLA CKD-EPI: 85 ML/MIN/1.73 M 2
GLOBULIN SER CALC-MCNC: 2.5 G/DL (ref 1.9–3.5)
GLUCOSE SERPL-MCNC: 87 MG/DL (ref 65–99)
HDLC SERPL-MCNC: 45 MG/DL
LDLC SERPL CALC-MCNC: 117 MG/DL
POTASSIUM SERPL-SCNC: 4.5 MMOL/L (ref 3.6–5.5)
PROT SERPL-MCNC: 6.6 G/DL (ref 6–8.2)
SODIUM SERPL-SCNC: 142 MMOL/L (ref 135–145)
TRIGL SERPL-MCNC: 93 MG/DL (ref 0–149)
TSH SERPL DL<=0.005 MIU/L-ACNC: 1.32 UIU/ML (ref 0.38–5.33)

## 2022-05-12 ENCOUNTER — HOSPITAL ENCOUNTER (OUTPATIENT)
Facility: MEDICAL CENTER | Age: 57
End: 2022-05-12
Attending: STUDENT IN AN ORGANIZED HEALTH CARE EDUCATION/TRAINING PROGRAM
Payer: MEDICARE

## 2022-05-12 DIAGNOSIS — K52.9 CHRONIC DIARRHEA: ICD-10-CM

## 2022-05-12 LAB
C DIFF DNA SPEC QL NAA+PROBE: NEGATIVE
C DIFF TOX GENS STL QL NAA+PROBE: NEGATIVE
IGA SERPL-MCNC: 138 MG/DL (ref 68–408)

## 2022-05-12 PROCEDURE — 87899 AGENT NOS ASSAY W/OPTIC: CPT

## 2022-05-12 PROCEDURE — 87045 FECES CULTURE AEROBIC BACT: CPT

## 2022-05-12 PROCEDURE — 87493 C DIFF AMPLIFIED PROBE: CPT

## 2022-05-13 LAB
ALP BONE SERPL-CCNC: 71 U/L (ref 0–55)
ALP ISOS SERPL HS-CCNC: 0 U/L
ALP LIVER SERPL-CCNC: 51 U/L (ref 0–94)
ALP SERPL-CCNC: 122 U/L (ref 40–120)
E COLI SXT1+2 STL IA: NORMAL
SIGNIFICANT IND 70042: NORMAL
SITE SITE: NORMAL
SOURCE SOURCE: NORMAL
TTG IGA SER IA-ACNC: <2 U/ML (ref 0–3)

## 2022-05-14 LAB
BACTERIA STL CULT: NORMAL
C JEJUNI+C COLI AG STL QL: NORMAL
E COLI SXT1+2 STL IA: NORMAL
SIGNIFICANT IND 70042: NORMAL
SITE SITE: NORMAL
SOURCE SOURCE: NORMAL

## 2022-05-27 ENCOUNTER — TELEPHONE (OUTPATIENT)
Dept: MEDICAL GROUP | Facility: PHYSICIAN GROUP | Age: 57
End: 2022-05-27
Payer: MEDICARE

## 2022-05-27 NOTE — TELEPHONE ENCOUNTER
ESTABLISHED PATIENT PRE-VISIT PLANNING     Patient was NOT contacted to complete PVP.     Note: Patient will not be contacted if there is no indication to call.     1.  Reviewed notes from the last few office visits within the medical group: Yes    2.  If any orders were placed at last visit or intended to be done for this visit (i.e. 6 mos follow-up), do we have Results/Consult Notes?         •  Labs - Labs ordered, completed on 5/12/22 and results are in chart.  Note: If patient appointment is for lab review and patient did not complete labs, check with provider if OK to reschedule patient until labs completed.       •  Imaging - Imaging ordered, completed and results are in chart.       •  Referrals - Referral ordered, patient has NOT been seen.    3. Is this appointment scheduled as a Hospital Follow-Up? No    4.  Immunizations were updated in Epic using Reconcile Outside Information activity? Yes    5.  Patient is due for the following Health Maintenance Topics:   Health Maintenance Due   Topic Date Due   • IMM PNEUMOCOCCAL VACCINE: 0-64 Years (2 - PCV) 10/01/2020   • PAP SMEAR  09/26/2021       6.  AHA (Pulse8) form printed for Provider? Yes

## 2022-06-02 ENCOUNTER — OFFICE VISIT (OUTPATIENT)
Dept: MEDICAL GROUP | Facility: PHYSICIAN GROUP | Age: 57
End: 2022-06-02
Payer: MEDICARE

## 2022-06-02 VITALS
HEIGHT: 69 IN | OXYGEN SATURATION: 99 % | DIASTOLIC BLOOD PRESSURE: 74 MMHG | WEIGHT: 205.2 LBS | HEART RATE: 88 BPM | TEMPERATURE: 97.9 F | BODY MASS INDEX: 30.39 KG/M2 | SYSTOLIC BLOOD PRESSURE: 110 MMHG

## 2022-06-02 DIAGNOSIS — E78.5 HYPERLIPIDEMIA, UNSPECIFIED HYPERLIPIDEMIA TYPE: ICD-10-CM

## 2022-06-02 DIAGNOSIS — Z86.711 HISTORY OF PULMONARY EMBOLISM: ICD-10-CM

## 2022-06-02 DIAGNOSIS — I70.0 AORTIC ATHEROSCLEROSIS (HCC): ICD-10-CM

## 2022-06-02 DIAGNOSIS — F17.200 SMOKER: ICD-10-CM

## 2022-06-02 DIAGNOSIS — F32.5 MAJOR DEPRESSIVE DISORDER WITH SINGLE EPISODE, IN FULL REMISSION (HCC): ICD-10-CM

## 2022-06-02 DIAGNOSIS — R74.8 ELEVATED ALKALINE PHOSPHATASE LEVEL: ICD-10-CM

## 2022-06-02 DIAGNOSIS — Z00.00 ENCOUNTER FOR ANNUAL GENERAL MEDICAL EXAMINATION WITHOUT ABNORMAL FINDINGS IN ADULT: ICD-10-CM

## 2022-06-02 DIAGNOSIS — M81.0 AGE-RELATED OSTEOPOROSIS WITHOUT CURRENT PATHOLOGICAL FRACTURE: ICD-10-CM

## 2022-06-02 DIAGNOSIS — G35 MULTIPLE SCLEROSIS (HCC): ICD-10-CM

## 2022-06-02 DIAGNOSIS — D68.59 PROTEIN S DEFICIENCY (HCC): ICD-10-CM

## 2022-06-02 DIAGNOSIS — D72.829 LEUKOCYTOSIS, UNSPECIFIED TYPE: ICD-10-CM

## 2022-06-02 PROCEDURE — 99396 PREV VISIT EST AGE 40-64: CPT | Performed by: STUDENT IN AN ORGANIZED HEALTH CARE EDUCATION/TRAINING PROGRAM

## 2022-06-02 RX ORDER — ALENDRONATE SODIUM 70 MG/1
70 TABLET ORAL
Qty: 12 TABLET | Refills: 3 | Status: SHIPPED | OUTPATIENT
Start: 2022-06-02

## 2022-06-02 ASSESSMENT — FIBROSIS 4 INDEX: FIB4 SCORE: 1.03

## 2022-06-02 NOTE — PATIENT INSTRUCTIONS
Shingles vaccine requires a booster dose 2 to 6 months after  Pneumonia vaccine due (20 )    Mammogram due to July of next year

## 2022-06-02 NOTE — Clinical Note
Could we get the pathology report from her colonoscopy in 2017? We have the first report, but isabela when she needs to have it repeated. Thanks!

## 2022-06-02 NOTE — PROGRESS NOTES
Subjective:     Chief Complaint   Patient presents with   • Annual Exam   • Follow-Up     labs     HPI:   Nadia Lazaro is a 56 y.o. female who presents for annual exam. She is feeling well and denies any complaints.    Ob-Gyn/ History:    /Para:   Last Pap Smear: Prior to hys, no history of abnormal pap smears.  Gyn Surgery:  Total hysterectomy.   Not currently sexually active.    Health Maintenance  Cholesterol Screenin  Diabetes Screenin  Aspirin Use: The 10-year ASCVD risk score (Lesley NANCE Jr., et al., 2013) is: 4.2%, not indicated  Diet: fairly good  Exercise: yes  Substance Abuse: denies aside from tobacco use since 8yo, currently 0.5ppd    Cancer screening  Colorectal Cancer Screenin, request path records to determine when f/u needed (pt thinks 7yrs)  Lung Cancer Screening: Declines this year.  Cervical Cancer Screening: s/p hys  Breast Cancer Screening: Agree to Q2 yr    Infectious disease screening/Immunizations  --Hepatitis C Screening: negative   --Immunizations:   Influenza: UTD   Tetanus: due 10/2022    Shingles: interested, will wait until in between infusions for MS   Pneumococcal : Has received 23, needs PNA 20- interested, will wait until in between infusions for MS   COVID: Series complete    She  has a past medical history of Anesthesia, Hyperlipidemia (1/3/2019), Indigestion (1/3/2019), Major depressive disorder with single episode, in full remission (HCC) (2018), MS (multiple sclerosis) ([facility]), Protein S deficiency (HCC), and Smoker (1/3/2019).  She  has a past surgical history that includes pump insert/remove (2010); pump insert/remove (10/19/2010); other (); other (); hysterectomy robotic xi (N/A, 11/15/2018); salpingectomy (Bilateral, 11/15/2018); and oophorectomy (Bilateral, 11/15/2018).    Family History   Problem Relation Age of Onset   • Hypertension Other    • No Known Problems Mother    • Lung Disease Father         emphysema,  58   • No Known Problems Sister    • No Known Problems Child    • Cancer Neg Hx    • Diabetes Neg Hx      Social History     Socioeconomic History   • Marital status:      Spouse name: Not on file   • Number of children: Not on file   • Years of education: Not on file   • Highest education level: Some college, no degree   Occupational History   • Not on file   Tobacco Use   • Smoking status: Current Every Day Smoker     Packs/day: 0.50     Years: 40.00     Pack years: 20.00     Types: Cigarettes   • Smokeless tobacco: Never Used   • Tobacco comment: 3-5 cigs/day, trying to quit. Started at 8yo.   Vaping Use   • Vaping Use: Never used   Substance and Sexual Activity   • Alcohol use: No   • Drug use: No   • Sexual activity: Not Currently   Other Topics Concern   • Not on file   Social History Narrative    Used to work for [employer], not working presently      Social Determinants of Health     Financial Resource Strain: Low Risk    • Difficulty of Paying Living Expenses: Not very hard   Food Insecurity: No Food Insecurity   • Worried About Running Out of Food in the Last Year: Never true   • Ran Out of Food in the Last Year: Never true   Transportation Needs: No Transportation Needs   • Lack of Transportation (Medical): No   • Lack of Transportation (Non-Medical): No   Physical Activity: Insufficiently Active   • Days of Exercise per Week: 3 days   • Minutes of Exercise per Session: 20 min   Stress: No Stress Concern Present   • Feeling of Stress : Only a little   Social Connections: Moderately Integrated   • Frequency of Communication with Friends and Family: Three times a week   • Frequency of Social Gatherings with Friends and Family: Once a week   • Attends Restoration Services: Never   • Active Member of Clubs or Organizations: No   • Attends Club or Organization Meetings: 1 to 4 times per year   • Marital Status:    Intimate Partner Violence: Not on file   Housing Stability: Low Risk    •  Unable to Pay for Housing in the Last Year: No   • Number of Places Lived in the Last Year: 1   • Unstable Housing in the Last Year: No     Patient Active Problem List    Diagnosis Date Noted   • Leukocytosis 06/03/2022   • Aortic atherosclerosis ([facility]) 03/03/2022   • BMI 29.0-29.9,adult 03/02/2022   • Vitamin D deficiency 03/02/2022   • Age-related osteoporosis without current pathological fracture 03/02/2022   • Multiple thyroid nodules 03/02/2022   • History of pulmonary embolism 03/02/2022   • History of DVT of lower extremity 03/02/2022   • Chronic diarrhea 03/02/2022   • Elevated alkaline phosphatase level 02/18/2020   • Post hysterectomy menopause 07/03/2019   • Smoker 01/03/2019   • Hyperlipidemia 01/03/2019   • MS (multiple sclerosis) ([facility]) 05/17/2018   • Protein S deficiency ([facility]) 05/17/2018   • Major depressive disorder with single episode, in full remission ([facility]) 05/17/2018   • Multiple allergies 05/17/2018     Current Outpatient Medications   Medication Sig Dispense Refill   • alendronate (FOSAMAX) 70 MG Tab Take 1 Tablet by mouth every 7 days. 12 Tablet 3   • XARELTO 20 MG Tab tablet TAKE 1 TABLET BY MOUTH EVERY DAY WITH DINNER 90 Tablet 3   • paroxetine (PAXIL) 40 MG tablet Take 1 Tablet by mouth every day. (Patient taking differently: Take 20 mg by mouth every day.) 90 Tablet 1   • fluconazole (DIFLUCAN) 150 MG tablet Take 1 tablet by mouth every 72 hours. 2 tablet 2   • Biotin 1000 MCG Chew Tab Take 3,000 mcg by mouth every day.     • magnesium oxide (MAG-OX) 400 (241.3 Mg) MG Tab tablet Take 1 Tab by mouth every day. 30 Tab 2   • diphenhydrAMINE (BENADRYL) 25 MG Tab Take 25 mg by mouth at bedtime as needed for Sleep.     • calcium carbonate (TUMS) 500 MG Chew Tab Take 500 mg by mouth every day.     • Pain Pump (PATIENT SUPPLIED) XX LEXI 1 Each by Injection route Continuous. Baclofen 20MCG daily  Bolas 10MCG/ 250MCG per ML  Pt last filled on 10/2018     • docosahexanoic acid (OMEGA 3 FA) 1000 MG Cap  "Take 1,000 mg by mouth every evening.     • Natalizumab (TYSABRI IV) 1 Dose by Intravenous route Q 4 Weeks.     • vitamin D (CHOLECALCIFEROL) 1000 UNIT TABS Take 1,000 Units by mouth 2 Times a Day.       No current facility-administered medications for this visit.     Allergies   Allergen Reactions   • Nubain [Nalbuphine Hcl]      Seizures       Review of Systems  Constitutional: Negative for fever, chills and malaise/fatigue.   HENT: Negative for congestion.    Eyes: Negative for pain.    Respiratory: Negative for cough and shortness of breath.  Cardiovascular: Negative for leg swelling.   Gastrointestinal: Negative for nausea, vomiting, abdominal pain and diarrhea.   Genitourinary: Negative for dysuria and hematuria.   Skin: Negative for rash.   Neurological: Negative for dizziness, focal weakness and headaches.   Endo/Heme/Allergies: Does not bleed easily.   Psychiatric/Behavioral: Negative for depression.  The patient is not nervous/anxious.      Objective:     /74 (BP Location: Left arm, Patient Position: Sitting, BP Cuff Size: Adult)   Pulse 88   Temp 36.6 °C (97.9 °F) (Temporal)   Ht 1.753 m (5' 9\")   Wt 93.1 kg (205 lb 3.2 oz)   LMP 11/10/2018   SpO2 99%   BMI 30.30 kg/m²   Body mass index is 30.3 kg/m².  Wt Readings from Last 4 Encounters:   06/02/22 93.1 kg (205 lb 3.2 oz)   03/02/22 91.2 kg (201 lb)   05/10/21 93.4 kg (206 lb)   01/28/20 99.3 kg (219 lb)     Physical Exam:  Constitutional: Well-developed and well-nourished. Not diaphoretic. No distress.   Skin: Skin is warm and dry. No rash noted.  Head: Atraumatic without lesions.  Eyes: Conjunctivae and extraocular motions are normal. Pupils are equal, round, and reactive to light. No scleral icterus.   Mouth/Throat: Wearing mask.  Neck: Supple, trachea midline.   Cardiovascular: Regular rate and rhythm, S1 and S2 without murmur, rubs, or gallops.  Lungs: Normal inspiratory effort, CTA bilaterally, no wheezes/rhonchi/rales  Breast: declined " by patient  Extremities: No cyanosis, clubbing, erythema, nor edema. Distal pulses intact and symmetric.   Musculoskeletal: All major joints AROM full in all directions without pain.  Neurological: Alert and oriented x 3. DTRs 2+/3 and symmetric. No cranial nerve deficit. 5/5 myotomes. Sensation intact.   Psychiatric:  Behavior, mood, and affect are appropriate.    Labs: Reviewed from 5/10/2022  Imaging: Reviewed from DEXA 4/19/2022    Assessment and Plan:     1. Encounter for annual general medical examination without abnormal findings in adult  HCM: Reviewed with patient as above.  Immunizations discussed/recommended.  Age-appropriate anticipatory guidance discussed.    2. Age-related osteoporosis without current pathological fracture  3. Elevated alkaline phosphatase level  Confirmed alkaline phosphatase isoenzymes elevated in the bone, related to osteoporosis.  Discussed options for treatment in addition to exercise, vitamin D and calcium.  Patient agrees to Fosamax, discussed how to properly take and possible side effects.  We will plan to repeat DEXA after 5 years of treatment.  - alendronate (FOSAMAX) 70 MG Tab; Take 1 Tablet by mouth every 7 days.  Dispense: 12 Tablet; Refill: 3    4. Hyperlipidemia, unspecified hyperlipidemia type  5. Aortic atherosclerosis (HCC)  This is a chronic condition, slightly improved from last year.  ASCVD risk is still low.  She does have a history of aortic atherosclerosis on prior imaging but most recent does not mention.  She understands that quitting smoking is probably the most beneficial thing she could do.  We did discuss statin which we agreed to hold off on at this time as she is trying to avoid polypharmacy.    6. Smoker  Chronic, has cut back from prior. Interested in cessation, but not at the present moment. Will f/u to assess willingness to quit, offered resources.     7. MS (multiple sclerosis) (HCC)  8. Leukocytosis, unspecified type  History of MS, followed by  neurology and recently had her medication infused.  Doing overall well.  Chronic leukocytosis is mild and may be related.  Will trend.  Defer management to neurology.    9. Major depressive disorder with single episode, in full remission (HCC)  Chronic, well controlled on lower dose of paxil (20mg daily). No changes to current treatment, patient to let me know if she want to continue to taper off-would provide with 10mg tablets.    10. Protein S deficiency (HCC)  11. History of pulmonary embolism  Chronic. No changes to current treatment.     Follow-up: Return in about 3 months (around 9/2/2022), or if symptoms worsen or fail to improve.

## 2022-06-03 PROBLEM — D72.829 LEUKOCYTOSIS: Status: ACTIVE | Noted: 2022-06-03

## 2022-06-10 ENCOUNTER — TELEPHONE (OUTPATIENT)
Dept: HEALTH INFORMATION MANAGEMENT | Facility: OTHER | Age: 57
End: 2022-06-10
Payer: MEDICARE

## 2022-06-21 ENCOUNTER — PATIENT MESSAGE (OUTPATIENT)
Dept: MEDICAL GROUP | Facility: PHYSICIAN GROUP | Age: 57
End: 2022-06-21

## 2022-06-21 ENCOUNTER — OFFICE VISIT (OUTPATIENT)
Dept: URGENT CARE | Facility: PHYSICIAN GROUP | Age: 57
End: 2022-06-21
Payer: MEDICARE

## 2022-06-21 VITALS
SYSTOLIC BLOOD PRESSURE: 110 MMHG | WEIGHT: 204 LBS | BODY MASS INDEX: 32.78 KG/M2 | HEART RATE: 85 BPM | DIASTOLIC BLOOD PRESSURE: 60 MMHG | OXYGEN SATURATION: 97 % | HEIGHT: 66 IN | RESPIRATION RATE: 14 BRPM | TEMPERATURE: 97.9 F

## 2022-06-21 DIAGNOSIS — T25.222A PARTIAL THICKNESS BURN OF LEFT FOOT, INITIAL ENCOUNTER: ICD-10-CM

## 2022-06-21 DIAGNOSIS — T25.229A PARTIAL THICKNESS BURN OF FOOT, UNSPECIFIED LATERALITY, INITIAL ENCOUNTER: ICD-10-CM

## 2022-06-21 PROBLEM — F13.20 SEDATIVE HYPNOTIC OR ANXIOLYTIC DEPENDENCE (HCC): Status: ACTIVE | Noted: 2022-06-21

## 2022-06-21 PROBLEM — F11.20 OPIOID TYPE DEPENDENCE, CONTINUOUS (HCC): Status: RESOLVED | Noted: 2022-06-21 | Resolved: 2022-06-21

## 2022-06-21 PROBLEM — Z86.19 HISTORY OF PCR DNA POSITIVE FOR HSV2: Status: ACTIVE | Noted: 2022-06-21

## 2022-06-21 PROBLEM — E66.9 OBESITY (BMI 30.0-34.9): Status: ACTIVE | Noted: 2022-06-21

## 2022-06-21 PROBLEM — Z72.0 TOBACCO ABUSE DISORDER: Status: ACTIVE | Noted: 2019-01-03

## 2022-06-21 PROBLEM — D68.69 SECONDARY HYPERCOAGULABLE STATE (HCC): Status: ACTIVE | Noted: 2022-06-21

## 2022-06-21 PROBLEM — R73.02 IGT (IMPAIRED GLUCOSE TOLERANCE): Status: ACTIVE | Noted: 2022-06-21

## 2022-06-21 PROBLEM — F11.20 OPIOID TYPE DEPENDENCE, CONTINUOUS (HCC): Status: ACTIVE | Noted: 2022-06-21

## 2022-06-21 PROCEDURE — 99213 OFFICE O/P EST LOW 20 MIN: CPT | Mod: 25 | Performed by: STUDENT IN AN ORGANIZED HEALTH CARE EDUCATION/TRAINING PROGRAM

## 2022-06-21 ASSESSMENT — FIBROSIS 4 INDEX: FIB4 SCORE: 1.03

## 2022-06-22 PROCEDURE — 90471 IMMUNIZATION ADMIN: CPT | Performed by: STUDENT IN AN ORGANIZED HEALTH CARE EDUCATION/TRAINING PROGRAM

## 2022-06-22 PROCEDURE — 90715 TDAP VACCINE 7 YRS/> IM: CPT | Performed by: STUDENT IN AN ORGANIZED HEALTH CARE EDUCATION/TRAINING PROGRAM

## 2022-06-22 RX ORDER — CEPHALEXIN 500 MG/1
1000 CAPSULE ORAL 2 TIMES DAILY
Qty: 20 CAPSULE | Refills: 0 | Status: SHIPPED | OUTPATIENT
Start: 2022-06-22 | End: 2022-06-27
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
## 2022-07-06 ENCOUNTER — NON-PROVIDER VISIT (OUTPATIENT)
Dept: WOUND CARE | Facility: MEDICAL CENTER | Age: 57
End: 2022-07-06
Attending: STUDENT IN AN ORGANIZED HEALTH CARE EDUCATION/TRAINING PROGRAM
Payer: MEDICARE

## 2022-07-06 PROCEDURE — 99211 OFF/OP EST MAY X REQ PHY/QHP: CPT
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
## 2022-07-06 NOTE — PATIENT INSTRUCTIONS
- Resolved wound be fragile for a few days, bathe and dry area gently, only ever regains a maximum of 80% of the tensile strength of the surrounding skin, remodeling of scar can continue for 6mo - a year. Contact PCP for a referral back her if any problems with area opening and draining again.    -Should you experience any significant changes in your wound(s), such as infection (redness, swelling, localized heat, increased pain, fever > 101 F, chills) or have any questions regarding your home care instructions, please contact the wound center at [phone]. If after hours, contact your primary care physician or go to the hospital emergency room.

## 2022-08-08 ENCOUNTER — OFFICE VISIT (OUTPATIENT)
Dept: URGENT CARE | Facility: PHYSICIAN GROUP | Age: 57
End: 2022-08-08
Payer: MEDICARE

## 2022-08-08 ENCOUNTER — APPOINTMENT (OUTPATIENT)
Dept: RADIOLOGY | Facility: IMAGING CENTER | Age: 57
End: 2022-08-08
Attending: PHYSICIAN ASSISTANT
Payer: MEDICARE

## 2022-08-08 VITALS
HEIGHT: 69 IN | BODY MASS INDEX: 29.62 KG/M2 | TEMPERATURE: 98.9 F | DIASTOLIC BLOOD PRESSURE: 66 MMHG | SYSTOLIC BLOOD PRESSURE: 118 MMHG | WEIGHT: 200 LBS | HEART RATE: 75 BPM | OXYGEN SATURATION: 96 % | RESPIRATION RATE: 18 BRPM

## 2022-08-08 DIAGNOSIS — S92.511A CLOSED DISPLACED FRACTURE OF PROXIMAL PHALANX OF LESSER TOE OF RIGHT FOOT, INITIAL ENCOUNTER: ICD-10-CM

## 2022-08-08 DIAGNOSIS — S99.921A INJURY OF TOE ON RIGHT FOOT, INITIAL ENCOUNTER: ICD-10-CM

## 2022-08-08 PROCEDURE — 73660 X-RAY EXAM OF TOE(S): CPT | Mod: TC,RT | Performed by: RADIOLOGY

## 2022-08-08 PROCEDURE — 99214 OFFICE O/P EST MOD 30 MIN: CPT | Performed by: PHYSICIAN ASSISTANT

## 2022-08-08 ASSESSMENT — ENCOUNTER SYMPTOMS
CHILLS: 0
FEVER: 0
VOMITING: 0
BRUISES/BLEEDS EASILY: 1
NAUSEA: 0
SENSORY CHANGE: 0
TINGLING: 0

## 2022-08-08 ASSESSMENT — FIBROSIS 4 INDEX: FIB4 SCORE: 1.05
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
## 2022-08-10 DIAGNOSIS — F32.5 MAJOR DEPRESSIVE DISORDER WITH SINGLE EPISODE, IN FULL REMISSION (HCC): ICD-10-CM

## 2022-08-10 RX ORDER — PAROXETINE HYDROCHLORIDE 20 MG/1
20 TABLET, FILM COATED ORAL DAILY
Qty: 90 TABLET | Refills: 1 | Status: SHIPPED | OUTPATIENT
Start: 2022-08-10

## 2022-08-10 RX ORDER — PAROXETINE HYDROCHLORIDE 20 MG/1
20 TABLET, FILM COATED ORAL DAILY
Qty: 30 TABLET | OUTPATIENT
Start: 2022-08-10

## 2022-09-13 ENCOUNTER — HOSPITAL ENCOUNTER (INPATIENT)
Facility: MEDICAL CENTER | Age: 57
LOS: 4 days | DRG: 605 | End: 2022-09-19
Attending: STUDENT IN AN ORGANIZED HEALTH CARE EDUCATION/TRAINING PROGRAM | Admitting: STUDENT IN AN ORGANIZED HEALTH CARE EDUCATION/TRAINING PROGRAM
Payer: MEDICARE

## 2022-09-13 ENCOUNTER — APPOINTMENT (OUTPATIENT)
Dept: RADIOLOGY | Facility: MEDICAL CENTER | Age: 57
DRG: 605 | End: 2022-09-13
Attending: STUDENT IN AN ORGANIZED HEALTH CARE EDUCATION/TRAINING PROGRAM
Payer: MEDICARE
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
## 2022-09-13 DIAGNOSIS — S09.90XA CLOSED HEAD INJURY, INITIAL ENCOUNTER: ICD-10-CM

## 2022-09-13 DIAGNOSIS — S01.01XA LACERATION OF SCALP, INITIAL ENCOUNTER: ICD-10-CM

## 2022-09-13 LAB
ALBUMIN SERPL BCP-MCNC: 4.1 G/DL (ref 3.2–4.9)
ALBUMIN/GLOB SERPL: 1.6 G/DL
ALP SERPL-CCNC: 114 U/L (ref 30–99)
ALT SERPL-CCNC: 10 U/L (ref 2–50)
ANION GAP SERPL CALC-SCNC: 14 MMOL/L (ref 7–16)
AST SERPL-CCNC: 17 U/L (ref 12–45)
BASOPHILS # BLD AUTO: 1.7 % (ref 0–1.8)
BASOPHILS # BLD: 0.21 K/UL (ref 0–0.12)
BILIRUB SERPL-MCNC: 0.3 MG/DL (ref 0.1–1.5)
BUN SERPL-MCNC: 15 MG/DL (ref 8–22)
CALCIUM SERPL-MCNC: 9.1 MG/DL (ref 8.5–10.5)
CHLORIDE SERPL-SCNC: 105 MMOL/L (ref 96–112)
CO2 SERPL-SCNC: 22 MMOL/L (ref 20–33)
CREAT SERPL-MCNC: 0.76 MG/DL (ref 0.5–1.4)
EOSINOPHIL # BLD AUTO: 0.54 K/UL (ref 0–0.51)
EOSINOPHIL NFR BLD: 4.4 % (ref 0–6.9)
ERYTHROCYTE [DISTWIDTH] IN BLOOD BY AUTOMATED COUNT: 48.7 FL (ref 35.9–50)
ERYTHROCYTE [DISTWIDTH] IN BLOOD BY AUTOMATED COUNT: 49.6 FL (ref 35.9–50)
GFR SERPLBLD CREATININE-BSD FMLA CKD-EPI: 91 ML/MIN/1.73 M 2
GLOBULIN SER CALC-MCNC: 2.5 G/DL (ref 1.9–3.5)
GLUCOSE SERPL-MCNC: 114 MG/DL (ref 65–99)
HCT VFR BLD AUTO: 39.1 % (ref 37–47)
HCT VFR BLD AUTO: 42.2 % (ref 37–47)
HGB BLD-MCNC: 13.6 G/DL (ref 12–16)
HGB BLD-MCNC: 14.5 G/DL (ref 12–16)
LACTATE SERPL-SCNC: 4.5 MMOL/L (ref 0.5–2)
LIPASE SERPL-CCNC: 294 U/L (ref 11–82)
LYMPHOCYTES # BLD AUTO: 5.67 K/UL (ref 1–4.8)
LYMPHOCYTES NFR BLD: 46.5 % (ref 22–41)
MANUAL DIFF BLD: NORMAL
MCH RBC QN AUTO: 31.8 PG (ref 27–33)
MCH RBC QN AUTO: 32.1 PG (ref 27–33)
MCHC RBC AUTO-ENTMCNC: 34.4 G/DL (ref 33.6–35)
MCHC RBC AUTO-ENTMCNC: 34.8 G/DL (ref 33.6–35)
MCV RBC AUTO: 92.2 FL (ref 81.4–97.8)
MCV RBC AUTO: 92.5 FL (ref 81.4–97.8)
MONOCYTES # BLD AUTO: 0.54 K/UL (ref 0–0.85)
MONOCYTES NFR BLD AUTO: 4.4 % (ref 0–13.4)
MORPHOLOGY BLD-IMP: NORMAL
MYELOCYTES NFR BLD MANUAL: 0.9 %
NEUTROPHILS # BLD AUTO: 5.14 K/UL (ref 2–7.15)
NEUTROPHILS NFR BLD: 42.1 % (ref 44–72)
NRBC # BLD AUTO: 0.02 K/UL
NRBC BLD-RTO: 0.2 /100 WBC
PLATELET # BLD AUTO: 295 K/UL (ref 164–446)
PLATELET # BLD AUTO: 339 K/UL (ref 164–446)
PLATELET BLD QL SMEAR: NORMAL
PMV BLD AUTO: 10.9 FL (ref 9–12.9)
PMV BLD AUTO: 10.9 FL (ref 9–12.9)
POTASSIUM SERPL-SCNC: 3.6 MMOL/L (ref 3.6–5.5)
PROT SERPL-MCNC: 6.6 G/DL (ref 6–8.2)
RBC # BLD AUTO: 4.24 M/UL (ref 4.2–5.4)
RBC # BLD AUTO: 4.56 M/UL (ref 4.2–5.4)
RBC BLD AUTO: NORMAL
SODIUM SERPL-SCNC: 141 MMOL/L (ref 135–145)
TROPONIN T SERPL-MCNC: 10 NG/L (ref 6–19)
WBC # BLD AUTO: 12.2 K/UL (ref 4.8–10.8)
WBC # BLD AUTO: 25.1 K/UL (ref 4.8–10.8)

## 2022-09-13 PROCEDURE — 85025 COMPLETE CBC W/AUTO DIFF WBC: CPT

## 2022-09-13 PROCEDURE — 304217 HCHG IRRIGATION SYSTEM

## 2022-09-13 PROCEDURE — 80053 COMPREHEN METABOLIC PANEL: CPT

## 2022-09-13 PROCEDURE — 700111 HCHG RX REV CODE 636 W/ 250 OVERRIDE (IP): Performed by: STUDENT IN AN ORGANIZED HEALTH CARE EDUCATION/TRAINING PROGRAM

## 2022-09-13 PROCEDURE — 72125 CT NECK SPINE W/O DYE: CPT

## 2022-09-13 PROCEDURE — 84484 ASSAY OF TROPONIN QUANT: CPT

## 2022-09-13 PROCEDURE — 83605 ASSAY OF LACTIC ACID: CPT

## 2022-09-13 PROCEDURE — 85007 BL SMEAR W/DIFF WBC COUNT: CPT

## 2022-09-13 PROCEDURE — 700101 HCHG RX REV CODE 250: Performed by: STUDENT IN AN ORGANIZED HEALTH CARE EDUCATION/TRAINING PROGRAM

## 2022-09-13 PROCEDURE — 70450 CT HEAD/BRAIN W/O DYE: CPT

## 2022-09-13 PROCEDURE — 0HQ0XZZ REPAIR SCALP SKIN, EXTERNAL APPROACH: ICD-10-PCS | Performed by: STUDENT IN AN ORGANIZED HEALTH CARE EDUCATION/TRAINING PROGRAM

## 2022-09-13 PROCEDURE — 36415 COLL VENOUS BLD VENIPUNCTURE: CPT

## 2022-09-13 PROCEDURE — 96374 THER/PROPH/DIAG INJ IV PUSH: CPT

## 2022-09-13 PROCEDURE — 96375 TX/PRO/DX INJ NEW DRUG ADDON: CPT

## 2022-09-13 PROCEDURE — 99285 EMERGENCY DEPT VISIT HI MDM: CPT

## 2022-09-13 PROCEDURE — 85027 COMPLETE CBC AUTOMATED: CPT

## 2022-09-13 PROCEDURE — 94760 N-INVAS EAR/PLS OXIMETRY 1: CPT

## 2022-09-13 PROCEDURE — 305308 HCHG STAPLER,SKIN,DISP.

## 2022-09-13 PROCEDURE — 83690 ASSAY OF LIPASE: CPT

## 2022-09-13 RX ORDER — LORAZEPAM 2 MG/ML
1 INJECTION INTRAMUSCULAR ONCE
Status: DISCONTINUED | OUTPATIENT
Start: 2022-09-13 | End: 2022-09-13

## 2022-09-13 RX ORDER — MIDAZOLAM HYDROCHLORIDE 1 MG/ML
2 INJECTION INTRAMUSCULAR; INTRAVENOUS ONCE
Status: COMPLETED | OUTPATIENT
Start: 2022-09-13 | End: 2022-09-13

## 2022-09-13 RX ORDER — LIDOCAINE HYDROCHLORIDE AND EPINEPHRINE 15; 5 MG/ML; UG/ML
20 INJECTION, SOLUTION EPIDURAL ONCE
Status: COMPLETED | OUTPATIENT
Start: 2022-09-13 | End: 2022-09-13

## 2022-09-13 RX ORDER — LORAZEPAM 2 MG/ML
0.5 INJECTION INTRAMUSCULAR ONCE
Status: COMPLETED | OUTPATIENT
Start: 2022-09-13 | End: 2022-09-13

## 2022-09-13 RX ORDER — TRANEXAMIC ACID 100 MG/ML
3 INJECTION, SOLUTION INTRAVENOUS ONCE
Status: COMPLETED | OUTPATIENT
Start: 2022-09-13 | End: 2022-09-13

## 2022-09-13 RX ADMIN — TRANEXAMIC ACID 300 MG: 100 INJECTION, SOLUTION INTRAVENOUS at 23:43

## 2022-09-13 RX ADMIN — LIDOCAINE HYDROCHLORIDE,EPINEPHRINE BITARTRATE 20 ML: 15; .005 INJECTION, SOLUTION EPIDURAL; INFILTRATION; INTRACAUDAL; PERINEURAL at 20:45

## 2022-09-13 RX ADMIN — FENTANYL CITRATE 25 MCG: 50 INJECTION, SOLUTION INTRAMUSCULAR; INTRAVENOUS at 22:02

## 2022-09-13 RX ADMIN — LORAZEPAM 0.5 MG: 2 INJECTION, SOLUTION INTRAMUSCULAR; INTRAVENOUS at 20:46

## 2022-09-13 RX ADMIN — MIDAZOLAM HYDROCHLORIDE 2 MG: 1 INJECTION, SOLUTION INTRAMUSCULAR; INTRAVENOUS at 22:28

## 2022-09-13 RX ADMIN — PROTHROMBIN, COAGULATION FACTOR VII HUMAN, COAGULATION FACTOR IX HUMAN, COAGULATION FACTOR X HUMAN, PROTEIN C, PROTEIN S HUMAN, AND WATER 2000 UNITS: KIT at 23:04

## 2022-09-13 ASSESSMENT — FIBROSIS 4 INDEX: FIB4 SCORE: 1.05

## 2022-09-14 PROBLEM — S01.91XA: Status: ACTIVE | Noted: 2022-09-14
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
## 2022-09-14 PROBLEM — E87.20 LACTIC ACIDOSIS: Status: ACTIVE | Noted: 2022-09-14

## 2022-09-14 LAB
ANION GAP SERPL CALC-SCNC: 12 MMOL/L (ref 7–16)
APPEARANCE UR: CLEAR
APTT PPP: 28.3 SEC (ref 24.7–36)
BACTERIA #/AREA URNS HPF: NEGATIVE /HPF
BASOPHILS # BLD AUTO: 0.4 % (ref 0–1.8)
BASOPHILS # BLD: 0.08 K/UL (ref 0–0.12)
BILIRUB UR QL STRIP.AUTO: NEGATIVE
BUN SERPL-MCNC: 18 MG/DL (ref 8–22)
CALCIUM SERPL-MCNC: 8.3 MG/DL (ref 8.5–10.5)
CHLORIDE SERPL-SCNC: 107 MMOL/L (ref 96–112)
CO2 SERPL-SCNC: 20 MMOL/L (ref 20–33)
COLOR UR: YELLOW
CREAT SERPL-MCNC: 0.89 MG/DL (ref 0.5–1.4)
EKG IMPRESSION: NORMAL
EOSINOPHIL # BLD AUTO: 0.02 K/UL (ref 0–0.51)
EOSINOPHIL NFR BLD: 0.1 % (ref 0–6.9)
EPI CELLS #/AREA URNS HPF: NEGATIVE /HPF
ERYTHROCYTE [DISTWIDTH] IN BLOOD BY AUTOMATED COUNT: 49.1 FL (ref 35.9–50)
GFR SERPLBLD CREATININE-BSD FMLA CKD-EPI: 76 ML/MIN/1.73 M 2
GLUCOSE SERPL-MCNC: 151 MG/DL (ref 65–99)
GLUCOSE UR STRIP.AUTO-MCNC: NEGATIVE MG/DL
HCT VFR BLD AUTO: 26.6 % (ref 37–47)
HCT VFR BLD AUTO: 31.1 % (ref 37–47)
HGB BLD-MCNC: 10.9 G/DL (ref 12–16)
HGB BLD-MCNC: 9.6 G/DL (ref 12–16)
HYALINE CASTS #/AREA URNS LPF: NORMAL /LPF
IMM GRANULOCYTES # BLD AUTO: 0.17 K/UL (ref 0–0.11)
IMM GRANULOCYTES NFR BLD AUTO: 0.8 % (ref 0–0.9)
INR PPP: 1.7 (ref 0.87–1.13)
KETONES UR STRIP.AUTO-MCNC: NEGATIVE MG/DL
LACTATE SERPL-SCNC: 1.9 MMOL/L (ref 0.5–2)
LACTATE SERPL-SCNC: 3.1 MMOL/L (ref 0.5–2)
LACTATE SERPL-SCNC: 3.2 MMOL/L (ref 0.5–2)
LEUKOCYTE ESTERASE UR QL STRIP.AUTO: ABNORMAL
LYMPHOCYTES # BLD AUTO: 3.56 K/UL (ref 1–4.8)
LYMPHOCYTES NFR BLD: 17.1 % (ref 22–41)
MAGNESIUM SERPL-MCNC: 1.8 MG/DL (ref 1.5–2.5)
MCH RBC QN AUTO: 32.2 PG (ref 27–33)
MCHC RBC AUTO-ENTMCNC: 35 G/DL (ref 33.6–35)
MCV RBC AUTO: 91.7 FL (ref 81.4–97.8)
MICRO URNS: ABNORMAL
MONOCYTES # BLD AUTO: 1.31 K/UL (ref 0–0.85)
MONOCYTES NFR BLD AUTO: 6.3 % (ref 0–13.4)
NEUTROPHILS # BLD AUTO: 15.69 K/UL (ref 2–7.15)
NEUTROPHILS NFR BLD: 75.3 % (ref 44–72)
NITRITE UR QL STRIP.AUTO: NEGATIVE
NRBC # BLD AUTO: 0.12 K/UL
NRBC BLD-RTO: 0.6 /100 WBC
PH UR STRIP.AUTO: 5.5 [PH] (ref 5–8)
PLATELET # BLD AUTO: 288 K/UL (ref 164–446)
PMV BLD AUTO: 11.1 FL (ref 9–12.9)
POTASSIUM SERPL-SCNC: 4.5 MMOL/L (ref 3.6–5.5)
PROCALCITONIN SERPL-MCNC: <0.05 NG/ML
PROT UR QL STRIP: NEGATIVE MG/DL
PROTHROMBIN TIME: 19.6 SEC (ref 12–14.6)
RBC # BLD AUTO: 3.39 M/UL (ref 4.2–5.4)
RBC # URNS HPF: NORMAL /HPF
RBC UR QL AUTO: NEGATIVE
SODIUM SERPL-SCNC: 139 MMOL/L (ref 135–145)
SP GR UR STRIP.AUTO: 1.01
UROBILINOGEN UR STRIP.AUTO-MCNC: 0.2 MG/DL
WBC # BLD AUTO: 20.8 K/UL (ref 4.8–10.8)
WBC #/AREA URNS HPF: NORMAL /HPF

## 2022-09-14 PROCEDURE — 36415 COLL VENOUS BLD VENIPUNCTURE: CPT

## 2022-09-14 PROCEDURE — 97165 OT EVAL LOW COMPLEX 30 MIN: CPT

## 2022-09-14 PROCEDURE — 96372 THER/PROPH/DIAG INJ SC/IM: CPT

## 2022-09-14 PROCEDURE — 80048 BASIC METABOLIC PNL TOTAL CA: CPT

## 2022-09-14 PROCEDURE — 96375 TX/PRO/DX INJ NEW DRUG ADDON: CPT

## 2022-09-14 PROCEDURE — 85025 COMPLETE CBC W/AUTO DIFF WBC: CPT

## 2022-09-14 PROCEDURE — 700111 HCHG RX REV CODE 636 W/ 250 OVERRIDE (IP): Performed by: STUDENT IN AN ORGANIZED HEALTH CARE EDUCATION/TRAINING PROGRAM

## 2022-09-14 PROCEDURE — G0378 HOSPITAL OBSERVATION PER HR: HCPCS

## 2022-09-14 PROCEDURE — 85014 HEMATOCRIT: CPT

## 2022-09-14 PROCEDURE — 81001 URINALYSIS AUTO W/SCOPE: CPT

## 2022-09-14 PROCEDURE — 99220 PR INITIAL OBSERVATION CARE,LEVL III: CPT | Performed by: STUDENT IN AN ORGANIZED HEALTH CARE EDUCATION/TRAINING PROGRAM

## 2022-09-14 PROCEDURE — 85018 HEMOGLOBIN: CPT

## 2022-09-14 PROCEDURE — 93005 ELECTROCARDIOGRAM TRACING: CPT | Performed by: STUDENT IN AN ORGANIZED HEALTH CARE EDUCATION/TRAINING PROGRAM

## 2022-09-14 PROCEDURE — 700102 HCHG RX REV CODE 250 W/ 637 OVERRIDE(OP): Performed by: STUDENT IN AN ORGANIZED HEALTH CARE EDUCATION/TRAINING PROGRAM

## 2022-09-14 PROCEDURE — 96376 TX/PRO/DX INJ SAME DRUG ADON: CPT

## 2022-09-14 PROCEDURE — 700105 HCHG RX REV CODE 258: Performed by: INTERNAL MEDICINE

## 2022-09-14 PROCEDURE — 83605 ASSAY OF LACTIC ACID: CPT

## 2022-09-14 PROCEDURE — A9270 NON-COVERED ITEM OR SERVICE: HCPCS | Performed by: STUDENT IN AN ORGANIZED HEALTH CARE EDUCATION/TRAINING PROGRAM

## 2022-09-14 PROCEDURE — 700105 HCHG RX REV CODE 258: Performed by: STUDENT IN AN ORGANIZED HEALTH CARE EDUCATION/TRAINING PROGRAM

## 2022-09-14 PROCEDURE — 97162 PT EVAL MOD COMPLEX 30 MIN: CPT

## 2022-09-14 PROCEDURE — 700111 HCHG RX REV CODE 636 W/ 250 OVERRIDE (IP): Performed by: INTERNAL MEDICINE

## 2022-09-14 RX ORDER — HEPARIN SODIUM 5000 [USP'U]/ML
5000 INJECTION, SOLUTION INTRAVENOUS; SUBCUTANEOUS EVERY 8 HOURS
Status: DISCONTINUED | OUTPATIENT
Start: 2022-09-14 | End: 2022-09-17

## 2022-09-14 RX ORDER — BISACODYL 10 MG
10 SUPPOSITORY, RECTAL RECTAL
Status: DISCONTINUED | OUTPATIENT
Start: 2022-09-14 | End: 2022-09-14

## 2022-09-14 RX ORDER — PAROXETINE HYDROCHLORIDE 20 MG/1
20 TABLET, FILM COATED ORAL DAILY
Status: DISCONTINUED | OUTPATIENT
Start: 2022-09-14 | End: 2022-09-19 | Stop reason: HOSPADM

## 2022-09-14 RX ORDER — OXYCODONE HYDROCHLORIDE 5 MG/1
5 TABLET ORAL
Status: DISCONTINUED | OUTPATIENT
Start: 2022-09-14 | End: 2022-09-19 | Stop reason: HOSPADM

## 2022-09-14 RX ORDER — SODIUM CHLORIDE 9 MG/ML
INJECTION, SOLUTION INTRAVENOUS CONTINUOUS
Status: DISCONTINUED | OUTPATIENT
Start: 2022-09-14 | End: 2022-09-16

## 2022-09-14 RX ORDER — ONDANSETRON 2 MG/ML
4 INJECTION INTRAMUSCULAR; INTRAVENOUS EVERY 4 HOURS PRN
Status: DISCONTINUED | OUTPATIENT
Start: 2022-09-14 | End: 2022-09-19 | Stop reason: HOSPADM

## 2022-09-14 RX ORDER — HYDROMORPHONE HYDROCHLORIDE 1 MG/ML
0.25 INJECTION, SOLUTION INTRAMUSCULAR; INTRAVENOUS; SUBCUTANEOUS
Status: DISCONTINUED | OUTPATIENT
Start: 2022-09-14 | End: 2022-09-19 | Stop reason: HOSPADM

## 2022-09-14 RX ORDER — PROMETHAZINE HYDROCHLORIDE 25 MG/1
12.5-25 TABLET ORAL EVERY 4 HOURS PRN
Status: DISCONTINUED | OUTPATIENT
Start: 2022-09-14 | End: 2022-09-19 | Stop reason: HOSPADM

## 2022-09-14 RX ORDER — PROCHLORPERAZINE EDISYLATE 5 MG/ML
5-10 INJECTION INTRAMUSCULAR; INTRAVENOUS EVERY 4 HOURS PRN
Status: DISCONTINUED | OUTPATIENT
Start: 2022-09-14 | End: 2022-09-19 | Stop reason: HOSPADM

## 2022-09-14 RX ORDER — HYDRALAZINE HYDROCHLORIDE 20 MG/ML
10 INJECTION INTRAMUSCULAR; INTRAVENOUS EVERY 4 HOURS PRN
Status: DISCONTINUED | OUTPATIENT
Start: 2022-09-14 | End: 2022-09-19 | Stop reason: HOSPADM

## 2022-09-14 RX ORDER — POLYETHYLENE GLYCOL 3350 17 G/17G
1 POWDER, FOR SOLUTION ORAL
Status: DISCONTINUED | OUTPATIENT
Start: 2022-09-14 | End: 2022-09-14

## 2022-09-14 RX ORDER — ONDANSETRON 4 MG/1
4 TABLET, ORALLY DISINTEGRATING ORAL EVERY 4 HOURS PRN
Status: DISCONTINUED | OUTPATIENT
Start: 2022-09-14 | End: 2022-09-19 | Stop reason: HOSPADM

## 2022-09-14 RX ORDER — AMOXICILLIN 250 MG
2 CAPSULE ORAL 2 TIMES DAILY
Status: DISCONTINUED | OUTPATIENT
Start: 2022-09-14 | End: 2022-09-14

## 2022-09-14 RX ORDER — ACETAMINOPHEN 325 MG/1
650 TABLET ORAL EVERY 6 HOURS PRN
Status: DISCONTINUED | OUTPATIENT
Start: 2022-09-14 | End: 2022-09-19 | Stop reason: HOSPADM

## 2022-09-14 RX ORDER — OXYCODONE HYDROCHLORIDE 5 MG/1
2.5 TABLET ORAL
Status: DISCONTINUED | OUTPATIENT
Start: 2022-09-14 | End: 2022-09-19 | Stop reason: HOSPADM

## 2022-09-14 RX ORDER — MIDAZOLAM HYDROCHLORIDE 1 MG/ML
2 INJECTION INTRAMUSCULAR; INTRAVENOUS ONCE
Status: COMPLETED | OUTPATIENT
Start: 2022-09-14 | End: 2022-09-14

## 2022-09-14 RX ORDER — PROMETHAZINE HYDROCHLORIDE 25 MG/1
12.5-25 SUPPOSITORY RECTAL EVERY 4 HOURS PRN
Status: DISCONTINUED | OUTPATIENT
Start: 2022-09-14 | End: 2022-09-19 | Stop reason: HOSPADM

## 2022-09-14 RX ORDER — SODIUM CHLORIDE, SODIUM LACTATE, POTASSIUM CHLORIDE, CALCIUM CHLORIDE 600; 310; 30; 20 MG/100ML; MG/100ML; MG/100ML; MG/100ML
INJECTION, SOLUTION INTRAVENOUS CONTINUOUS
Status: ACTIVE | OUTPATIENT
Start: 2022-09-14 | End: 2022-09-14

## 2022-09-14 RX ADMIN — PAROXETINE HYDROCHLORIDE 20 MG: 20 TABLET, FILM COATED ORAL at 17:31

## 2022-09-14 RX ADMIN — SODIUM CHLORIDE: 9 INJECTION, SOLUTION INTRAVENOUS at 11:20

## 2022-09-14 RX ADMIN — MIDAZOLAM HYDROCHLORIDE 2 MG: 1 INJECTION, SOLUTION INTRAMUSCULAR; INTRAVENOUS at 00:17

## 2022-09-14 RX ADMIN — SODIUM CHLORIDE: 9 INJECTION, SOLUTION INTRAVENOUS at 21:55

## 2022-09-14 RX ADMIN — ONDANSETRON 4 MG: 2 INJECTION INTRAMUSCULAR; INTRAVENOUS at 04:50

## 2022-09-14 RX ADMIN — SODIUM CHLORIDE, POTASSIUM CHLORIDE, SODIUM LACTATE AND CALCIUM CHLORIDE: 600; 310; 30; 20 INJECTION, SOLUTION INTRAVENOUS at 01:57

## 2022-09-14 RX ADMIN — HEPARIN SODIUM 5000 UNITS: 5000 INJECTION, SOLUTION INTRAVENOUS; SUBCUTANEOUS at 22:00

## 2022-09-14 ASSESSMENT — COGNITIVE AND FUNCTIONAL STATUS - GENERAL
MOBILITY SCORE: 21
SUGGESTED CMS G CODE MODIFIER MOBILITY: CJ
CLIMB 3 TO 5 STEPS WITH RAILING: A LITTLE
DAILY ACTIVITIY SCORE: 24
STANDING UP FROM CHAIR USING ARMS: A LITTLE
SUGGESTED CMS G CODE MODIFIER DAILY ACTIVITY: CH
WALKING IN HOSPITAL ROOM: A LITTLE

## 2022-09-14 ASSESSMENT — ENCOUNTER SYMPTOMS
WEIGHT LOSS: 0
PHOTOPHOBIA: 0
WHEEZING: 0
BACK PAIN: 0
DIARRHEA: 1
ABDOMINAL PAIN: 0
BLURRED VISION: 0
BRUISES/BLEEDS EASILY: 0
VOMITING: 0
INSOMNIA: 0
HEADACHES: 1
LOSS OF CONSCIOUSNESS: 0
ORTHOPNEA: 0
COUGH: 0
CHILLS: 0
PALPITATIONS: 0
FALLS: 1
CONSTITUTIONAL NEGATIVE: 1
DOUBLE VISION: 0
HEARTBURN: 0
MYALGIAS: 0
DIARRHEA: 0
NECK PAIN: 0
FOCAL WEAKNESS: 0
SHORTNESS OF BREATH: 0
CLAUDICATION: 0
CONSTIPATION: 0
FEVER: 0
SORE THROAT: 0
BLOOD IN STOOL: 0
NAUSEA: 0
HEMOPTYSIS: 0
SPEECH CHANGE: 0
DEPRESSION: 0
WEAKNESS: 0
DIZZINESS: 0

## 2022-09-14 ASSESSMENT — GAIT ASSESSMENTS
GAIT LEVEL OF ASSIST: STANDBY ASSIST
DISTANCE (FEET): 30
ASSISTIVE DEVICE: FRONT WHEEL WALKER
DEVIATION: DECREASED BASE OF SUPPORT

## 2022-09-14 ASSESSMENT — ACTIVITIES OF DAILY LIVING (ADL): TOILETING: INDEPENDENT

## 2022-09-14 ASSESSMENT — PAIN DESCRIPTION - PAIN TYPE: TYPE: ACUTE PAIN

## 2022-09-14 NOTE — ASSESSMENT & PLAN NOTE
Patient has history of multiple DVT and PE   held Xarelto secondary to scalp lesions  Kcentra given in ED  Trauma surgery on board, rec starting xarelto 9/18 night

## 2022-09-14 NOTE — ED NOTES
Patient is resting comfortably with eyes closed. Breathing is even and unlabored. The pt remains on the monitor. Vitals stable.

## 2022-09-14 NOTE — ED NOTES
MD at bedside performing lac repair with tech assisting. The pt starts to hyperventilate and get restless. Staff console the pt and the RN medicates per mar

## 2022-09-14 NOTE — ED PROVIDER NOTES
ED Provider Note    CHIEF COMPLAINT  Chief Complaint   Patient presents with    T-5000 Head Injury     The pt was moving boxes and tripped over treadmill. Pt sustained 2-3 inch avulsion to head. Dressing in place to control bleeding       HPI  Nadia Lazaro is a 57 y.o. female who presents after ground-level fall.  She states that she was feeling dizzy, grabbed onto a 'low down' coffee table at her ankle subsequently twisted and she grabbed onto the treadmill, falling forwards onto her head.  This occurred just prior to arrival.  She has pain in her head only.  No neck pain, no pain in her chest or abdomen.  She has not had any nausea or vomiting.  The pain is described as mild to moderate, it is constant it is throbbing.  It does not radiate.  She did not lose consciousness.  She did not have any chest pain, shortness of breath or palpitations prior to the episode.  She states she has chronic issues with balance and dizziness and this has attributed to her MS.  She states that she is able to ambulate without assistance.     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      Denies numbness, unilateral weakness, facial asymmetry, speech changes.  No dysphagia, dysarthria, diplopia. No difficulty with gait or coordination.    She states TDAP updated within the past two years    REVIEW OF SYSTEMS  As per HPI, otherwise a 10 point review of systems is negative    PAST MEDICAL HISTORY  Past Medical History:   Diagnosis Date    Anesthesia     PONV    Hyperlipidemia 1/3/2019    Indigestion 1/3/2019    Major depressive disorder with single episode, in full remission (HCC) 5/17/2018    MS (multiple sclerosis) (HCC)     Protein S deficiency (HCC)     on coumadin    Smoker 1/3/2019     No significant family history     SOCIAL HISTORY  Social History     Tobacco Use    Smoking status: Every Day     Packs/day: 0.50     Years: 40.00     Pack years: 20.00     Types: Cigarettes    Smokeless tobacco: Never    Tobacco  comments:     3-5 cigs/day, trying to quit. Started at 10yo.   Vaping Use    Vaping Use: Never used   Substance Use Topics    Alcohol use: No    Drug use: No       SURGICAL HISTORY  Past Surgical History:   Procedure Laterality Date    HYSTERECTOMY ROBOTIC XI N/A 11/15/2018    Procedure: HYSTERECTOMY ROBOTIC XI;  Surgeon: Santo Watts M.D.;  Location: SURGERY [facility];  Service: Gynecology    SALPINGECTOMY Bilateral 11/15/2018    Procedure: SALPINGECTOMY;  Surgeon: Santo Watts M.D.;  Location: SURGERY [facility];  Service: Gynecology    OOPHORECTOMY Bilateral 11/15/2018    Procedure: OOPHORECTOMY;  Surgeon: Santo Watts M.D.;  Location: SURGERY [facility];  Service: Gynecology    OTHER  2016    IVC filter taken out    PUMP INSERT/REMOVE  10/19/2010    Performed by SOMMER PAREDES at SURGERY [facility]    PUMP INSERT/REMOVE  8/24/2010    Performed by SOMMER PAREDES at [facility]    OTHER  2010    IVC filter insertion       CURRENT MEDICATIONS  Home Medications       Reviewed by Jelani Jensen (Pharmacy Tech) on 09/14/22 at 0044  Med List Status: Complete     Medication Last Dose Status   alendronate (FOSAMAX) 70 MG Tab 9/7/2022 Active   Biotin 1000 MCG Chew Tab 9/12/2022 Active   calcium carbonate (TUMS) 500 MG Chew Tab 9/12/2022 Active   diphenhydrAMINE (BENADRYL) 25 MG Tab PRN Active   docosahexanoic acid (OMEGA 3 FA) 1000 MG Cap 9/12/2022 Active   magnesium oxide (MAG-OX) 400 (241.3 Mg) MG Tab tablet 9/12/2022 Active   Natalizumab (TYSABRI IV) 8/31/2022 Active   Pain Pump (PATIENT SUPPLIED) XX LEXI Continuous Active   PARoxetine (PAXIL) 20 MG Tab 9/13/2022 Active   rivaroxaban (XARELTO) 20 MG Tab tablet 9/13/2022 Active   vitamin D (CHOLECALCIFEROL) 1000 UNIT TABS 9/12/2022 Active                    ALLERGIES  Allergies   Allergen Reactions    Nubain [Nalbuphine Hcl]      Seizures         PHYSICAL EXAM  VITAL SIGNS: /80   Pulse 88   Temp 36.6 °C (97.9 °F)  (Temporal)   Resp 12   Wt 90.7 kg (200 lb)   LMP 11/10/2018   SpO2 98%   BMI 29.53 kg/m²    Constitutional: Awake and alert .Mentating well, holding a towel over her head  HENT: No mathew sign or racoon eyes    18cm degloving scalp laceration with venous oozing  (image above is s/p repair) to the left temple and left forehead  Eyes: Normal inspection  Neck: Grossly normal range of motion. No C spine tenderness  Cardiovascular: Normal heart rate, Normal rhythm.  Symmetric peripheral pulses.   Thorax & Lungs: No respiratory distress, No wheezing, No rales, No rhonchi, No chest tenderness.   Abdomen: Soft, non-distended, nontender, no mass  Skin: No obvious rash.  Back: No tenderness, No CVA tenderness.   Extremities: Warm, well perfused. No clubbing, cyanosis, edema,   Neurologic: A&O x 4. Moving all extremities symmetrically. Normal sensation to light touch  Psychiatric: Normal for situation    RADIOLOGY/PROCEDURES  CT-HEAD W/O   Final Result         1. No acute intracranial abnormality. No evidence of acute intracranial hemorrhage or mass lesion.      2. Scalp laceration in the left temple and left forehead               CT-CSPINE WITHOUT PLUS RECONS   Final Result      No acute fracture or dislocation seen in the CT scan of the cervical spine.           I personally reviewed the images and my interpretation is no acute intracranial hemorrhage    Labs:  Results for orders placed or performed during the hospital encounter of 09/13/22   CBC WITH DIFFERENTIAL   Result Value Ref Range    WBC 12.2 (H) 4.8 - 10.8 K/uL    RBC 4.56 4.20 - 5.40 M/uL    Hemoglobin 14.5 12.0 - 16.0 g/dL    Hematocrit 42.2 37.0 - 47.0 %    MCV 92.5 81.4 - 97.8 fL    MCH 31.8 27.0 - 33.0 pg    MCHC 34.4 33.6 - 35.0 g/dL    RDW 49.6 35.9 - 50.0 fL    Platelet Count 295 164 - 446 K/uL    MPV 10.9 9.0 - 12.9 fL    Neutrophils-Polys 42.10 (L) 44.00 - 72.00 %    Lymphocytes 46.50 (H) 22.00 - 41.00 %    Monocytes 4.40 0.00 - 13.40 %     Eosinophils 4.40 0.00 - 6.90 %    Basophils 1.70 0.00 - 1.80 %    Nucleated RBC 0.20 /100 WBC    Neutrophils (Absolute) 5.14 2.00 - 7.15 K/uL    Lymphs (Absolute) 5.67 (H) 1.00 - 4.80 K/uL    Monos (Absolute) 0.54 0.00 - 0.85 K/uL    Eos (Absolute) 0.54 (H) 0.00 - 0.51 K/uL    Baso (Absolute) 0.21 (H) 0.00 - 0.12 K/uL    NRBC (Absolute) 0.02 K/uL   COMP METABOLIC PANEL   Result Value Ref Range    Sodium 141 135 - 145 mmol/L    Potassium 3.6 3.6 - 5.5 mmol/L    Chloride 105 96 - 112 mmol/L    Co2 22 20 - 33 mmol/L    Anion Gap 14.0 7.0 - 16.0    Glucose 114 (H) 65 - 99 mg/dL    Bun 15 8 - 22 mg/dL    Creatinine 0.76 0.50 - 1.40 mg/dL    Calcium 9.1 8.5 - 10.5 mg/dL    AST(SGOT) 17 12 - 45 U/L    ALT(SGPT) 10 2 - 50 U/L    Alkaline Phosphatase 114 (H) 30 - 99 U/L    Total Bilirubin 0.3 0.1 - 1.5 mg/dL    Albumin 4.1 3.2 - 4.9 g/dL    Total Protein 6.6 6.0 - 8.2 g/dL    Globulin 2.5 1.9 - 3.5 g/dL    A-G Ratio 1.6 g/dL   LIPASE   Result Value Ref Range    Lipase 294 (H) 11 - 82 U/L   LACTIC ACID   Result Value Ref Range    Lactic Acid 4.5 (HH) 0.5 - 2.0 mmol/L   TROPONIN   Result Value Ref Range    Troponin T 10 6 - 19 ng/L   ESTIMATED GFR   Result Value Ref Range    GFR (CKD-EPI) 91 >60 mL/min/1.73 m 2   CBC WITHOUT DIFFERENTIAL   Result Value Ref Range    WBC 25.1 (H) 4.8 - 10.8 K/uL    RBC 4.24 4.20 - 5.40 M/uL    Hemoglobin 13.6 12.0 - 16.0 g/dL    Hematocrit 39.1 37.0 - 47.0 %    MCV 92.2 81.4 - 97.8 fL    MCH 32.1 27.0 - 33.0 pg    MCHC 34.8 33.6 - 35.0 g/dL    RDW 48.7 35.9 - 50.0 fL    Platelet Count 339 164 - 446 K/uL    MPV 10.9 9.0 - 12.9 fL   DIFFERENTIAL MANUAL   Result Value Ref Range    Myelocytes 0.90 %    Manual Diff Status PERFORMED    PERIPHERAL SMEAR REVIEW   Result Value Ref Range    Peripheral Smear Review see below    PLATELET ESTIMATE   Result Value Ref Range    Plt Estimation Normal    MORPHOLOGY   Result Value Ref Range    RBC Morphology Normal        Medications   fentaNYL (SUBLIMAZE)  injection 25 mcg (25 mcg Intravenous Given 9/13/22 2202)   PARoxetine (PAXIL) tablet 20 mg (has no administration in time range)   lactated ringers infusion ( Intravenous New Bag 9/14/22 0157)   acetaminophen (Tylenol) tablet 650 mg (has no administration in time range)   Pharmacy Consult Request ...Pain Management Review 1 Each (has no administration in time range)   oxyCODONE immediate-release (ROXICODONE) tablet 2.5 mg (has no administration in time range)     Or   oxyCODONE immediate-release (ROXICODONE) tablet 5 mg (has no administration in time range)     Or   HYDROmorphone (Dilaudid) injection 0.25 mg (has no administration in time range)   hydrALAZINE (APRESOLINE) injection 10 mg (has no administration in time range)   ondansetron (ZOFRAN) syringe/vial injection 4 mg (has no administration in time range)   ondansetron (ZOFRAN ODT) dispertab 4 mg (has no administration in time range)   promethazine (PHENERGAN) tablet 12.5-25 mg (has no administration in time range)   promethazine (PHENERGAN) suppository 12.5-25 mg (has no administration in time range)   prochlorperazine (COMPAZINE) injection 5-10 mg (has no administration in time range)   lidocaine-epinephrine 1.5 %-1:[id number] injection 20 mL (20 mL Injection Given by Provider 9/13/22 2045)   LORazepam (ATIVAN) injection 0.5 mg (0.5 mg Intravenous Given 9/13/22 2046)   prothrombin complex conc human (Kcentra) 1000 units KIT 2,000 Units (2,000 Units Intravenous Given 9/13/22 2304)   midazolam (Versed) injection 2 mg (2 mg Intravenous Given 9/13/22 2228)   tranexamic acid (CYKLOKAPRON) 1000 MG/10ML for nasal packing 300 mg (300 mg Nasal Given 9/13/22 2343)   midazolam (Versed) injection 2 mg (2 mg Intravenous Given 9/14/22 0017)     Procedures  Procedure - Laceration closure  Location: Scalp  Complex Laceration, degloving injury to the left temporal and left forehead.18 cm in length  Patient was positioned appropriately, 10cc lidocaine with/epinephrine was used  as a local anesthetic. 1000cc NaCl was used for irrigation. Patient was sterile draped with wound exposed. 2 x nylon sutures and 21 x staples were placed with good approximation. The nylon sutures were later removed and only placed for purpose of achieving initial hemostasis. Procedure tolerated without complications.     Measures:  HYDRATION: Based on the patient's presentation of Dehydration the patient was given IV fluids. IV Hydration was used because oral hydration was not adequate alone. Upon recheck following hydration, the patient was improved.    COURSE & MEDICAL DECISION MAKING    Patient is 57 y.o.F, who presents to the Emergency Department with concern for closed head injury.     Differential diagnoses considered include, but not limited to: closed head injury; intracranial bleed or skull fracture.  Based on history and physical, I do not suspect traumatic injury to the chest, abdomen or pelvis.     Patient arrived stable, afebrile, and in good condition with a GCS of 15. Physical examination detailed above and notable for large degloving scalp laceration but no evidence of basilar skull fractures and a normal neurological examination. CT Head without contrast and CT C spine ordered. Labs notable for mild leukocytosis, normal hemoglobin.  Lactate is elevated at 4.5 and she has a mildly elevated lipase.  CT Head without intracranial hemorrhage.  CT of the C spine  without traumatic fracture or subluxation.      The laceration was repaired by me.  No evidence of contamination.  Tdap already up to date. This was a very complex laceration, patient with significant ongoing bleeding and was not tolerating the procedure well requiring multiple doses of sedation in order to tolerate repair.  Ultimately hemostasis was achieved with 21 staples.  She has a small hematoma under the laceration which does not appear to be expanding.  TXA soaked gauze was applied over the wound.  Additionally, I ordered for Kcentra to  reverse her Xarelto given the extensive bleeding noted during time of repair.    Fortunately patient remained hemodynamically stable with normal mental status.  She was resuscitated with fluid in the setting of an elevated lactate.  She does not have any abdominal pain and I am not quite sure the clinical significance of her elevated lipase.  She does have a normal mental status however I am concerned given her significant blood loss and hx of anticoagulation and so will admit the patient for observation and serial H&H testing.  The hospitalist Dr. Saenz graciously admitted the patient.  I do not feel that she requires the ICU at this time.  Of note I did speak to the on-call trauma surgeon  who felt that she was appropriate for admission to the medical service given that she did not have any intracranial hemorrhage or ongoing bleeding.    ED Observation  Patient placed into ED observation around 10:30 PM.  Patient lost a significant amount of blood from her complex scalp laceration.  She has not had any hypotension and she still mentating normally.  Repeat CBC at this time without significant change.  Plan to recheck CBC around 2 AM.  Patient getting Kcentra at this time.    Critical care time : Erinn presents with an acute critical illness and in my judgement had significant potential for imminent deterioration. I provided 38 minutes of Critical Care time to this patient, exclusive of any separately billable procedures. This included bedside direction of care, frequent re-evaluations, time spent coordinating ongoing consultant care and time of medical documentation.       FINAL IMPRESSION  1. Laceration of scalp, initial encounter Acute       2. Closed head injury, initial encounter Acute               This dictation was created using voice recognition software. The accuracy of the dictation is limited to the abilities of the software.  The nursing notes were reviewed and certain aspects of this  information were incorporated into this note.      Electronically signed by: Debra Mckeon M.D., 9/13/2022 8:20 PM

## 2022-09-14 NOTE — PROGRESS NOTES
Hospital Medicine Daily Progress Note    Date of Service  9/14/2022    Chief Complaint  Nadia Lazaro is a 57 y.o. female admitted 9/13/2022 with fall    Hospital Course    57-year-old female with history of multiple sclerosis and protein S deficiency with clots and DVTs presented 9/13 with fall.  Patient has some balance issues due to MS and she fell and was ambulating at home, denied any dizziness or syncope, no palpitation or chest pain and no shortness of breath.  On admission patient was found to have laceration on her head, CT scan did not show any fracture or intracranial bleeding.  Labs showed leukocytosis and lactic acid, patient did not need antibiotics, IV fluid was started.  Patient is on Xarelto for DVT and protein S deficiency, Xarelto was held and patient received Kcentra given in ED.  Patient was evaluated by trauma team and no need for intervention or surgery.      Interval Problem Update  -Evaluated examined the patient at bedside, denied any new symptoms, no significant bleeding from her laceration  -We will repeat labs and CBC today and resume Xarelto if it safe, discussed the risk/benefit from Xarelto with the patient and she understood and agreed.  -Continue IV fluid for tachycardia and lactic acidosis, no signs of infection.  -PT and OT evaluation before discharge.      I have discussed this patient's plan of care and discharge plan at IDT rounds today with Case Management, Nursing, Nursing leadership, and other members of the IDT team.    Consultants/Specialty  general surgery    Code Status  Full Code    Disposition  Patient is not medically cleared for discharge.   Anticipate discharge to to home with organized home healthcare and close outpatient follow-up.  I have placed the appropriate orders for post-discharge needs.    Review of Systems  Review of Systems   Constitutional: Negative.  Negative for chills, fever and weight loss.   HENT: Negative.  Negative for ear pain, hearing  loss and tinnitus.    Eyes:  Negative for blurred vision, double vision and photophobia.   Respiratory:  Negative for cough and hemoptysis.    Cardiovascular:  Negative for chest pain, palpitations, orthopnea and claudication.   Gastrointestinal:  Negative for abdominal pain, constipation, diarrhea, nausea and vomiting.   Genitourinary:  Negative for dysuria, frequency and urgency.   Musculoskeletal:  Negative for myalgias and neck pain.   Skin: Negative.  Negative for rash.   Neurological:  Negative for dizziness, speech change and weakness.      Physical Exam  Temp:  [36.6 °C (97.9 °F)] 36.6 °C (97.9 °F)  Pulse:  [] 105  Resp:  [12-28] 20  BP: (104-170)/(60-95) 118/73  SpO2:  [92 %-98 %] 94 %    Physical Exam  HENT:      Head:      Comments: Laceration   Eyes:      General: No scleral icterus.  Cardiovascular:      Rate and Rhythm: Tachycardia present.      Heart sounds: No murmur heard.  Pulmonary:      Effort: No respiratory distress.      Breath sounds: No wheezing.   Abdominal:      General: There is no distension.      Tenderness: There is no abdominal tenderness. There is no guarding.   Musculoskeletal:         General: No deformity or signs of injury.      Right lower leg: No edema.      Left lower leg: No edema.   Skin:     Coloration: Skin is not jaundiced.      Findings: Lesion and rash present. No bruising.   Neurological:      General: No focal deficit present.      Sensory: No sensory deficit.      Motor: No weakness.      Coordination: Coordination normal.      Gait: Gait abnormal.       Fluids  No intake or output data in the 24 hours ending 09/14/22 1115    Laboratory  Recent Labs     09/13/22 2043 09/13/22 2226   WBC 12.2* 25.1*   RBC 4.56 4.24   HEMOGLOBIN 14.5 13.6   HEMATOCRIT 42.2 39.1   MCV 92.5 92.2   MCH 31.8 32.1   MCHC 34.4 34.8   RDW 49.6 48.7   PLATELETCT 295 339   MPV 10.9 10.9     Recent Labs     09/13/22 2043   SODIUM 141   POTASSIUM 3.6   CHLORIDE 105   CO2 22   GLUCOSE  114*   BUN 15   CREATININE 0.76   CALCIUM 9.1     Recent Labs     09/14/22  0057   APTT 28.3   INR 1.70*               Imaging  CT-HEAD W/O   Final Result         1. No acute intracranial abnormality. No evidence of acute intracranial hemorrhage or mass lesion.      2. Scalp laceration in the left temple and left forehead               CT-CSPINE WITHOUT PLUS RECONS   Final Result      No acute fracture or dislocation seen in the CT scan of the cervical spine.           Assessment/Plan  * Laceration of head with complication, initial encounter- (present on admission)  Assessment & Plan  After fall, patient has balance issues due to MS  CT scan did not show intracranial bleeding  Trauma surgery evaluated the patient and no need for intervention  Continue monitoring hemoglobin and consider restarting Xarelto as soon as possible if it safe  PT and OT evaluation and placement if needed    Fall  Assessment & Plan  Likely mechanical fall due to balance issue due to MS  PT and OT and placement if needed    Protein S deficiency (HCC)- (present on admission)  Assessment & Plan  Patient has history of multiple DVT and PE   held Xarelto secondary to scalp lesions  Kcentra given in ED  Close monitoring and resume Xarelto as soon as possible.  Risk/benefit from Xarelto was discussed with the patient and she understand the risk of DVT also risk of bleeding  CBC twice daily  Since there is no significant bleeding, will resume heparin for DVT prophylaxis    Lactic acidosis  Assessment & Plan  Likely related to dehydration  Repeat labs and start with antibiotics if needed    Leukocytosis- (present on admission)  Assessment & Plan  Likely reactive secondary to fall  Procalcitonin is negative and no signs of infection  Repeat CBC  Hold IV antibiotics at this time as patient is not febrile and denies all constitutional symptoms and had mechanical ground-level fall likely inducing her reactive leukocytosis    MS (multiple sclerosis)  ([facility])- (present on admission)  Assessment & Plan  Follow-up outpatient PCP and neurology after discharge  No neurofocal deficit or exacerbation of her symptoms  PT and OT       VTE prophylaxis: SCDs/TEDs and heparin ppx    I have performed a physical exam and reviewed and updated ROS and Plan today (9/14/2022). In review of yesterday's note (9/13/2022), there are no changes except as documented above.

## 2022-09-14 NOTE — ED NOTES
The pt is laying in bed with eyes closed. The pt is arousable to voice. The pt appears slightly pale hematoma developing around lac. The pt reports mild dizziness. purewick and wound cleaning performed by tech. Vitals stable.

## 2022-09-14 NOTE — ED NOTES
Pt is seen sitting on the side of the bed stating they need to use the restroom. RN notes that blood is dripping from dressing. RN applies pressure and changes gauze. RN notifies ED MD of agatha and states that they will come to room to perform suture

## 2022-09-14 NOTE — ED NOTES
Pt up to commode.  Linens changed and pillows and warm blankets provided.    Admitting MD at bedside.

## 2022-09-14 NOTE — ASSESSMENT & PLAN NOTE
With symptoms of ataxia and dizziness, I ordered a MRI brain with and without IV contrast for evaluation of progression of her MS  She has neurology to follow as outpatient  No neurofocal deficit or exacerbation of her symptoms  PT and OT

## 2022-09-14 NOTE — ED NOTES
The pt is seen sitting up in bed hyperventilating and stating that he is panicking. RN consoles the pt and the appears to calm down. ED MD notified and stated will order med.

## 2022-09-14 NOTE — ED NOTES
Med rec completed per patient  Allergies reviewed  No PO Antibiotics in the last 30 days     Patient has a Pain Pump, however I am unable to confirm its doses and bolus with Dr. Luna office at this time.

## 2022-09-14 NOTE — DISCHARGE PLANNING
[facility]/SCP TCN chart review completed. Collaborated with LONDON Cuello and Gutierrez. The current review of medical record, knowledge of pt's PLOF and social support, LACE+ score of 39, 6 clicks scores of (none entered) mobility were considered.      Pt seen at bedside, AOx4, large left scalp laceration MIKE with Oakley intact. Mbr endorses living with Spouse in a mobile home with a ramp. Mrb owns necessary DMEs suchs as Wheelchair, FWW and cane; Modified Independent with mobility as her baseline. Mbr states they are in the process of moving on 9/26/2022.  Introduced TCN program. Provided education regarding post acute levels of care. Discussed HTH/SCP plan benefits (Meds to Beds, medical uber and [facility] transitional care). Pt verbalizes understanding.  Proactively sending referral to [facility].OT PT eval pending. Choice proactively obtained for  and given to LONDON Cuello. TCN will continue to follow and collaborate with discharge planning team as additional post acute needs arise. Thank you.     Completed today:  Choice obtained: [facility] referral (sent) 9/14/2022

## 2022-09-14 NOTE — ED NOTES
The pt stands to pivot onto bedside commode with a steady gait. The pt had sudden onset of nausea. Vitals stable. The pt assisted back to bed and hooked back up to the monitor. RN to medicate per mar.

## 2022-09-14 NOTE — ED NOTES
Patient is resting comfortably. The pt reports mild head pain but is refusing pain meds at this time. The pt is calm and cooperative

## 2022-09-14 NOTE — CARE PLAN
Problem: Pain - Standard  Goal: Alleviation of pain or a reduction in pain to the patient’s comfort goal  Outcome: Progressing     Problem: Knowledge Deficit - Standard  Goal: Patient and family/care givers will demonstrate understanding of plan of care, disease process/condition, diagnostic tests and medications  Outcome: Progressing   The patient is Stable - Low risk of patient condition declining or worsening    Shift Goals  Clinical Goals: Monitor labs, Monitor swelling in head  Patient Goals: Pain control, Rest    Progress made toward(s) clinical / shift goals:     Patient is not progressing towards the following goals:

## 2022-09-14 NOTE — ED NOTES
The pt is laying in bed with eyes closed. Breathing is even and unlabored on room air. The pt arousable to voice and becomes oriented. The pt refusing morning med at this time, stating that she takes it in the afternoon and that she took it PTA. Vitals stable and the pt remains on the monitor

## 2022-09-14 NOTE — ED TRIAGE NOTES
Pt BIBA to trauma bay  Chief Complaint   Patient presents with    T-5000 Head Injury     The pt was moving boxes and tripped over treadmill. Pt sustained 2-3 inch avulsion to head. Dressing in place to control bleeding

## 2022-09-14 NOTE — ED NOTES
The pt is alert and oriented sitting in bed. The pt is hyperventilating and states that she is panicking. RN and tech console the pt and re-wrap the head dressing to make it more comfortable.

## 2022-09-14 NOTE — THERAPY
Physical Therapy   Initial Evaluation     Patient Name: Nadia Lazaro  Age:  57 y.o., Sex:  female  Medical Record #: [id number]  Today's Date: 9/14/2022     Precautions  Precautions: Fall Risk    Assessment  Patient is 57 y.o. female with a history of MS. Pt presented to ED after GLF in which she tripped and hit her head on a treadmill. Pt is now s/p laceration repair. Pt mobilized as detailed below and at this time does not demonstrate any need for skilled physical therapy services at this time. Pt states she owns all necessary DME and states understanding of utilizing DME as needed.     Plan    Recommend Physical Therapy for Evaluation only     DC Equipment Recommendations: None  Discharge Recommendations: Anticipate that the patient will have no further physical therapy needs after discharge from the hospital        Objective       09/14/22 1101   Initial Contact Note    Initial Contact Note Order Received and Verified, Physical Therapy Evaluation in Progress with Full Report to Follow.   Precautions   Precautions Fall Risk   Pain 0 - 10 Group   Therapist Pain Assessment Nurse Notified;Post Activity Pain Same as Prior to Activity  (no c/o)   Prior Living Situation   Prior Services None   Housing / Facility Mobile Home   Steps Into Home 3  (has a ramp)   Rail Right Rail (Steps into Home)   Equipment Owned Front-Wheel Walker;Single Point Cane;Wheelchair;Ramp   Lives with - Patient's Self Care Capacity Spouse   Prior Level of Functional Mobility   Bed Mobility Independent   Transfer Status Independent   Ambulation Independent   Distance Ambulation (Feet)   (community)   Assistive Devices Used None  (depending on the day, usually none)   Stairs Independent  (uses ramps, can navigate curbs though)   History of Falls   History of Falls Yes   Cognition    Cognition / Consciousness WDL   Passive ROM Lower Body   Passive ROM Lower Body WDL   Active ROM Lower Body    Active ROM Lower Body  WDL   Strength Lower Body    Lower Body Strength  WDL   Balance Assessment   Sitting Balance (Static) Fair +   Sitting Balance (Dynamic) Fair +   Standing Balance (Static) Fair   Standing Balance (Dynamic) Fair   Weight Shift Sitting Good   Weight Shift Standing Fair   Gait Analysis   Gait Level Of Assist Standby Assist   Assistive Device Front Wheel Walker   Distance (Feet) 30   # of Times Distance was Traveled 2   Deviation Decreased Base Of Support  (scissoring gait)   # of Stairs Climbed   (pt demonstrated high knees and ability to weight shift, maintain balance, and have appropriate strengtht to safely navigate stairs)   Weight Bearing Status no restrictions   Bed Mobility    Supine to Sit Supervised   Sit to Supine Supervised   Scooting Supervised   Rolling Supervised   Functional Mobility   Sit to Stand Supervised   Bed, Chair, Wheelchair Transfer Standby Assist   Toilet Transfers Standby Assist   Transfer Method Stand Step   Mobility sup to sit, sts, gait, toilet, gait, sit to sup   How much difficulty does the patient currently have...   Turning over in bed (including adjusting bedclothes, sheets and blankets)? 4   Sitting down on and standing up from a chair with arms (e.g., wheelchair, bedside commode, etc.) 4   Moving from lying on back to sitting on the side of the bed? 4   How much help from another person does the patient currently need...   Moving to and from a bed to a chair (including a wheelchair)? 3   Need to walk in a hospital room? 3   Climbing 3-5 steps with a railing? 3   6 clicks Mobility Score 21   Activity Tolerance   Sitting Edge of Bed 5 min   Standing 8 min   Education Group   Education Provided Role of Physical Therapist   Role of Physical Therapist Patient Response Patient;Acceptance;Explanation;Verbal Demonstration   Anticipated Discharge Equipment and Recommendations   DC Equipment Recommendations None   Discharge Recommendations Anticipate that the patient will have no further physical therapy needs after  discharge from the hospital   Interdisciplinary Plan of Care Collaboration   IDT Collaboration with  ;Nursing;Occupational Therapist   Patient Position at End of Therapy In Bed;Call Light within Reach;Phone within Reach   Collaboration Comments Pt findings   Session Information   Date / Session Number  9/14- eval only

## 2022-09-14 NOTE — THERAPY
Occupational Therapy   Initial Evaluation     Patient Name: Nadia Lazaro  Age:  57 y.o., Sex:  female  Medical Record #: [id number]  Today's Date: 9/14/2022     Precautions  Precautions: Fall Risk    Assessment  Patient is a 57 y.o. female who fell and hit her head on her treadmill. Pt has a h/o MS and takes Xarelto for a h/o DVT/PE. At baseline, pt is fully independent and plans to move to [place] next week to help her parents. During OT eval, pt completed LB dressing, toileting and grooming in stance at the sink all with supervision. Pt denies headache, vision changes, and light sensitivity. Pt with no further skilled OT needs in the acute care setting at this time. She is eager to discharge home.     Plan    Recommend Occupational Therapy for Evaluation only     DC Equipment Recommendations: None  Discharge Recommendations: Anticipate that the patient will have no further occupational therapy needs after discharge from the hospital     Subjective    Very pleasant and agreeable      Objective       09/14/22 1104   Prior Living Situation   Prior Services None   Housing / Facility Mobile Home   Steps Into Home 3   Bathroom Set up Walk In Shower;Grab Bars;Shower Chair   Equipment Owned Front-Wheel Walker;Single Point Cane;Wheelchair;Ramp   Lives with - Patient's Self Care Capacity Spouse   Comments Spouse works days   Prior Level of ADL Function   Self Feeding Independent   Grooming / Hygiene Independent   Bathing Independent   Dressing Independent   Toileting Independent   Prior Level of IADL Function   Medication Management Independent   Laundry Independent   Kitchen Mobility Independent   Home Management Independent   Shopping Independent   Prior Level Of Mobility Independent Without Device in Community   Driving / Transportation Driving Independent   Occupation (Pre-Hospital Vocational) Homemaker   History of Falls   History of Falls Yes   Date of Last Fall 09/13/22   Precautions   Precautions Fall Risk    Vitals   O2 Delivery Device None - Room Air   Pain 0 - 10 Group   Therapist Pain Assessment During Activity;Nurse Notified;0   Cognition    Cognition / Consciousness WDL   Active ROM Upper Body   Active ROM Upper Body  WDL   Dominant Hand Left   Strength Upper Body   Upper Body Strength  WDL   Upper Body Muscle Tone   Upper Body Muscle Tone  WDL   Coordination Upper Body   Coordination WDL   Balance Assessment   Sitting Balance (Static) Fair +   Sitting Balance (Dynamic) Fair +   Standing Balance (Static) Fair   Standing Balance (Dynamic) Fair   Weight Shift Sitting Good   Weight Shift Standing Fair   Comments without AD   Bed Mobility    Supine to Sit Supervised   Sit to Supine Supervised   Scooting Supervised   ADL Assessment   Grooming Supervision;Standing  (wash hands)   Lower Body Dressing Supervision  (socks)   Toileting Supervision   6 Clicks Daily Activity Score 24   Functional Mobility   Sit to Stand Supervised   Toilet Transfers Supervised   Mobility walked to/from hallway bathroom without AD, no LOB noted   Education Group   Education Provided Role of Occupational Therapist   Role of Occupational Therapist Patient Response Patient;Acceptance;Explanation;Verbal Demonstration   Problem List   Problem List None

## 2022-09-14 NOTE — H&P
Hospital Medicine History & Physical Note    Date of Service  9/14/2022    Primary Care Physician  Rica Noonan D.O.    Consultants  Trauma Surgery: Deng Blas MD    Code Status  Full Code    Chief Complaint  Chief Complaint   Patient presents with    T-5000 Head Injury     The pt was moving boxes and tripped over treadmill. Pt sustained 2-3 inch avulsion to head. Dressing in place to control bleeding       History of Presenting Illness  Nadia Lazaro is a 57 y.o. female who presented 9/13/2022 with history of multiple sclerosis states that she was moving boxes and had tripped over herself and landed on her treadmill.  She is currently taking Xarelto for history of DVT/PE and noticed she had a scalp lesion with blood loss.  She was brought into ED for laceration repair and further work-up.  She does have DME for ambulation at home inclusive of walker, cane.  She states this was a mechanical fall in nature and did not have any presyncopal syndrome prior to her fall.  While in ED during laceration repair she was given Versed for agitation and noted to be somnolent subsequently.  No focal neurologic deficits appreciated by primary staff.  Trauma surgery was consulted and recommended observation in ED with serial H/H if concern for continuous bleeding.  Patient was given Kcentra in ED for reversal of her Xarelto.  She otherwise denies blood loss from stool/urine or hemoptysis.  She denies dysuria/hematuria, vertigo, syncope or loss of consciousness.  She also denies anosmia and ageusia/myalgias but does admit to intermittent diarrhea which is chronic for her.  She has been fully vaccine against COVID-19 and denies any signs or symptoms of COVID-19 at time of evaluation.  Of note patient does complain she is a right broken pinky toe from walking into a vacuum .    Vital signs in ER were as follows: 97.9, 62, 16, 168/95, 96% on room air.    CT head performed reveals scalp laceration of left temple  and left forehead otherwise unremarkable.  CT cervical spine unremarkable without fractures or dislocation.    Lactic acid initially 4.5, troponin T within normal limits, lipase elevated to 94, unremarkable CMP.  CBC reveals likely reactive leukocytosis initially 12.2 and on repeat 25.1.  Procalcitonin ordered and pending.    Coagulation studies ordered and pending.    Hospitalist consulted for admission.  Patient agreeable to observation admission for scalp laceration and ground-level fall in setting of MS.  She has a DO NOT RESUSCITATE on file however states she would like full CODE STATUS and  agreeable with this at time of evaluation.  She will be optimized in ED and subsequently transferred to neurology with cardiac telemetry monitoring for further optimization medical management.  Agreeable for PT/OT in a.m.    I discussed the plan of care with patient.    Review of Systems  Review of Systems   Constitutional:  Negative for chills and fever.   HENT:  Negative for congestion and sore throat.    Eyes:  Negative for blurred vision and double vision.   Respiratory:  Negative for cough, shortness of breath and wheezing.    Cardiovascular:  Negative for chest pain and palpitations.   Gastrointestinal:  Positive for diarrhea (intermittent). Negative for abdominal pain, blood in stool, constipation, heartburn, melena, nausea and vomiting.   Genitourinary:  Negative for dysuria and frequency.   Musculoskeletal:  Positive for falls. Negative for back pain and neck pain.   Skin:  Negative for itching and rash.   Neurological:  Positive for headaches. Negative for focal weakness, loss of consciousness and weakness.   Endo/Heme/Allergies:  Negative for environmental allergies. Does not bruise/bleed easily.   Psychiatric/Behavioral:  Negative for depression. The patient does not have insomnia.      Past Medical History   has a past medical history of Anesthesia, Hyperlipidemia (1/3/2019), Indigestion (1/3/2019),  Major depressive disorder with single episode, in full remission (HCC) (2018), MS (multiple sclerosis) ([facility]), Protein S deficiency ([facility]), and Smoker (1/3/2019).    Surgical History   has a past surgical history that includes pump insert/remove (2010); pump insert/remove (10/19/2010); other (); other (); hysterectomy robotic xi (N/A, 11/15/2018); salpingectomy (Bilateral, 11/15/2018); and oophorectomy (Bilateral, 11/15/2018).     Family History  family history includes Hypertension in an other family member; Lung Disease in her father; No Known Problems in her child, mother, and sister.   Family history reviewed with patient. There is no family history that is pertinent to the chief complaint.     Social History   reports that she has been smoking cigarettes. She has a 20.00 pack-year smoking history. She has never used smokeless tobacco. She reports that she does not drink alcohol and does not use drugs.    Allergies  Allergies   Allergen Reactions    Nubain [Nalbuphine Hcl]      Seizures         Medications  Prior to Admission Medications   Prescriptions Last Dose Informant Patient Reported? Taking?   Biotin 1000 MCG Chew Tab   Yes No   Sig: Take 3,000 mcg by mouth every day.   Natalizumab (TYSABRI IV)  Patient Yes No   Si Dose by Intravenous route Q 4 Weeks.   PARoxetine (PAXIL) 20 MG Tab   No No   Sig: Take 1 Tablet by mouth every day.   Pain Pump (PATIENT SUPPLIED) XX LEXI  Patient Yes No   Si Each by Injection route Continuous. Baclofen 20MCG daily  Bolas 10MCG/ 250MCG per ML  Pt last filled on 10/2018   alendronate (FOSAMAX) 70 MG Tab   No No   Sig: Take 1 Tablet by mouth every 7 days.   calcium carbonate (TUMS) 500 MG Chew Tab   Yes No   Sig: Take 500 mg by mouth every day.   diclofenac sodium (VOLTAREN) 1 % Gel   No No   Sig: Apply 2 g topically 4 times a day as needed (pain).   diphenhydrAMINE (BENADRYL) 25 MG Tab  Patient Yes No   Sig: Take 25 mg by mouth at bedtime as needed for  Sleep.   docosahexanoic acid (OMEGA 3 FA) 1000 MG Cap  Patient Yes No   Sig: Take 1,000 mg by mouth every evening.   fluconazole (DIFLUCAN) 150 MG tablet   No No   Sig: Take 1 tablet by mouth every 72 hours.   magnesium oxide (MAG-OX) 400 (241.3 Mg) MG Tab tablet   No No   Sig: Take 1 Tab by mouth every day.   rivaroxaban (XARELTO) 20 MG Tab tablet   No No   Sig: Take 1 Tablet by mouth with dinner.   vitamin D (CHOLECALCIFEROL) 1000 UNIT TABS  Patient Yes No   Sig: Take 1,000 Units by mouth 2 Times a Day.      Facility-Administered Medications: None       Physical Exam  Temp:  [36.6 °C (97.9 °F)] 36.6 °C (97.9 °F)  Pulse:  [] 107  Resp:  [16-25] 25  BP: (106-170)/(60-95) 106/60  SpO2:  [92 %-98 %] 92 %  Blood Pressure: 106/60   Temperature: 36.6 °C (97.9 °F)   Pulse: (!) 107   Respiration: (!) 25   Pulse Oximetry: 92 %       Physical Exam  Constitutional:       Appearance: Normal appearance.   HENT:      Head:      Comments: Bandaging over scalp appreciated with some blood seepage     Mouth/Throat:      Mouth: Mucous membranes are dry.      Pharynx: Oropharynx is clear. No posterior oropharyngeal erythema.      Comments: Mallampati class IV, dry mucous membranes  Eyes:      General: No scleral icterus.     Extraocular Movements: Extraocular movements intact.      Conjunctiva/sclera: Conjunctivae normal.      Pupils: Pupils are equal, round, and reactive to light.   Neck:      Vascular: No carotid bruit.   Cardiovascular:      Rate and Rhythm: Regular rhythm. Tachycardia present.      Pulses: Normal pulses.      Heart sounds: Normal heart sounds. No murmur heard.    No friction rub. No gallop.   Pulmonary:      Effort: Pulmonary effort is normal.      Breath sounds: Normal breath sounds. No wheezing, rhonchi or rales.   Abdominal:      General: Abdomen is flat. Bowel sounds are normal. There is no distension.      Palpations: There is no mass.      Tenderness: There is no abdominal tenderness. There is no  rebound.   Musculoskeletal:         General: No swelling. Normal range of motion.      Cervical back: Normal range of motion.      Right lower leg: No edema.      Left lower leg: No edema.   Lymphadenopathy:      Cervical: No cervical adenopathy.   Skin:     General: Skin is warm and dry.      Capillary Refill: Capillary refill takes less than 2 seconds.      Findings: No erythema or rash.   Neurological:      General: No focal deficit present.      Mental Status: She is alert and oriented to person, place, and time. Mental status is at baseline.      Cranial Nerves: No cranial nerve deficit.      Sensory: No sensory deficit.      Motor: No weakness.   Psychiatric:         Mood and Affect: Mood normal.         Behavior: Behavior normal.       Laboratory:  Recent Labs     09/13/22 2043 09/13/22 2226   WBC 12.2* 25.1*   RBC 4.56 4.24   HEMOGLOBIN 14.5 13.6   HEMATOCRIT 42.2 39.1   MCV 92.5 92.2   MCH 31.8 32.1   MCHC 34.4 34.8   RDW 49.6 48.7   PLATELETCT 295 339   MPV 10.9 10.9     Recent Labs     09/13/22 2043   SODIUM 141   POTASSIUM 3.6   CHLORIDE 105   CO2 22   GLUCOSE 114*   BUN 15   CREATININE 0.76   CALCIUM 9.1     Recent Labs     09/13/22 2043   ALTSGPT 10   ASTSGOT 17   ALKPHOSPHAT 114*   TBILIRUBIN 0.3   LIPASE 294*   GLUCOSE 114*         No results for input(s): NTPROBNP in the last 72 hours.      Recent Labs     09/13/22 2043   TROPONINT 10   Twelve-lead EKG has been ordered and pending.    Imaging:  CT-HEAD W/O   Final Result         1. No acute intracranial abnormality. No evidence of acute intracranial hemorrhage or mass lesion.      2. Scalp laceration in the left temple and left forehead               CT-CSPINE WITHOUT PLUS RECONS   Final Result      No acute fracture or dislocation seen in the CT scan of the cervical spine.          I reviewed and interpreted the above CT head and do appreciate scalp laceration as seen above      Assessment/Plan:  Justification for Admission Status  I  anticipate this patient is appropriate for observation status at this time because does not meet inpatient criteria at this time.        * Laceration of head with complication, initial encounter- (present on admission)  Assessment & Plan  Appreciated on CT head as seen above  Hold Xarelto  Kcentra given in ED  Coagulation studies ordered and pending  Analgesics for pain control  Fall precautions  Orthostatic vital signs  Trauma surgery consulted and agreeable with observation in ED    Leukocytosis- (present on admission)  Assessment & Plan  Likely reactive secondary to fall  Urinalysis ordered and currently pending  Procalcitonin ordered and pending  Repeat CBC in a.m.  Hold IV antibiotics at this time as patient is not febrile and denies all constitutional symptoms and had mechanical ground-level fall likely inducing her reactive leukocytosis    Protein S deficiency (HCC)- (present on admission)  Assessment & Plan  Held Xarelto secondary to scalp lesions  Kcentra given in ED  Coagulation studies ordered and currently pending  Admit to neurology with seizure precautions, fall precautions    MS (multiple sclerosis) (HCC)- (present on admission)  Assessment & Plan  Follow-up outpatient PCP and neurology after discharge      VTE prophylaxis: pharmacologic prophylaxis contraindicated due to Active bleed

## 2022-09-14 NOTE — ASSESSMENT & PLAN NOTE
Likely reactive secondary to fall  Procalcitonin is negative and no signs of infection  Repeat CBC  Hold IV antibiotics at this time as patient is not febrile and denies all constitutional symptoms and had mechanical ground-level fall likely inducing her reactive leukocytosis

## 2022-09-14 NOTE — ASSESSMENT & PLAN NOTE
Due to ground-level fall related to ataxia and dizziness from MS  CT scan did not show intracranial bleeding  Repeated CT head L high frontoparietal scalp hematoma  Continue monitoring hemoglobin and continue to hold Xarelto at this time  Trauma surgery on board, possible hematoma evacuation and scalp wound exploration  PT and OT evaluation rec [facility], ordered

## 2022-09-14 NOTE — ED NOTES
Rounded on PT. PT and   updated on need for assigned inpatient hospital bed. PT verbalized understanding and denies any other needs at this time.

## 2022-09-14 NOTE — ED NOTES
The pt is laying in bed with eyes closed. Breathing is even and unlabored. Vitals stable on room air. The pt remains on the monitor

## 2022-09-15 ENCOUNTER — APPOINTMENT (OUTPATIENT)
Dept: RADIOLOGY | Facility: MEDICAL CENTER | Age: 57
DRG: 605 | End: 2022-09-15
Attending: INTERNAL MEDICINE
Payer: MEDICARE

## 2022-09-15 PROBLEM — D62 ACUTE BLOOD LOSS ANEMIA: Status: ACTIVE | Noted: 2022-09-15

## 2022-09-15 LAB
ALBUMIN SERPL BCP-MCNC: 3.4 G/DL (ref 3.2–4.9)
ALBUMIN/GLOB SERPL: 1.9 G/DL
ALP SERPL-CCNC: 74 U/L (ref 30–99)
ALT SERPL-CCNC: 8 U/L (ref 2–50)
ANION GAP SERPL CALC-SCNC: 11 MMOL/L (ref 7–16)
APTT PPP: 27.5 SEC (ref 24.7–36)
AST SERPL-CCNC: 12 U/L (ref 12–45)
BASOPHILS # BLD AUTO: 0 % (ref 0–1.8)
BASOPHILS # BLD: 0 K/UL (ref 0–0.12)
BILIRUB SERPL-MCNC: 0.3 MG/DL (ref 0.1–1.5)
BUN SERPL-MCNC: 17 MG/DL (ref 8–22)
CALCIUM SERPL-MCNC: 8.4 MG/DL (ref 8.5–10.5)
CHLORIDE SERPL-SCNC: 107 MMOL/L (ref 96–112)
CO2 SERPL-SCNC: 21 MMOL/L (ref 20–33)
CREAT SERPL-MCNC: 0.94 MG/DL (ref 0.5–1.4)
EOSINOPHIL # BLD AUTO: 0.17 K/UL (ref 0–0.51)
EOSINOPHIL NFR BLD: 0.9 % (ref 0–6.9)
ERYTHROCYTE [DISTWIDTH] IN BLOOD BY AUTOMATED COUNT: 49.3 FL (ref 35.9–50)
GFR SERPLBLD CREATININE-BSD FMLA CKD-EPI: 71 ML/MIN/1.73 M 2
GLOBULIN SER CALC-MCNC: 1.8 G/DL (ref 1.9–3.5)
GLUCOSE SERPL-MCNC: 139 MG/DL (ref 65–99)
HCT VFR BLD AUTO: 24.8 % (ref 37–47)
HCT VFR BLD AUTO: 25 % (ref 37–47)
HCT VFR BLD AUTO: 25.7 % (ref 37–47)
HGB BLD-MCNC: 8.4 G/DL (ref 12–16)
HGB BLD-MCNC: 8.6 G/DL (ref 12–16)
HGB BLD-MCNC: 8.9 G/DL (ref 12–16)
INR PPP: 1.02 (ref 0.87–1.13)
LACTATE SERPL-SCNC: 1.9 MMOL/L (ref 0.5–2)
LIPASE SERPL-CCNC: 105 U/L (ref 11–82)
LYMPHOCYTES # BLD AUTO: 6.94 K/UL (ref 1–4.8)
LYMPHOCYTES NFR BLD: 37.7 % (ref 22–41)
MANUAL DIFF BLD: NORMAL
MCH RBC QN AUTO: 32 PG (ref 27–33)
MCHC RBC AUTO-ENTMCNC: 34.6 G/DL (ref 33.6–35)
MCV RBC AUTO: 92.4 FL (ref 81.4–97.8)
MONOCYTES # BLD AUTO: 0.81 K/UL (ref 0–0.85)
MONOCYTES NFR BLD AUTO: 4.4 % (ref 0–13.4)
MORPHOLOGY BLD-IMP: NORMAL
NEUTROPHILS # BLD AUTO: 10.49 K/UL (ref 2–7.15)
NEUTROPHILS NFR BLD: 57 % (ref 44–72)
NRBC # BLD AUTO: 0.1 K/UL
NRBC BLD-RTO: 0.5 /100 WBC
PLATELET # BLD AUTO: 222 K/UL (ref 164–446)
PLATELET BLD QL SMEAR: NORMAL
PMV BLD AUTO: 11 FL (ref 9–12.9)
POTASSIUM SERPL-SCNC: 4 MMOL/L (ref 3.6–5.5)
PROT SERPL-MCNC: 5.2 G/DL (ref 6–8.2)
PROTHROMBIN TIME: 13.3 SEC (ref 12–14.6)
RBC # BLD AUTO: 2.78 M/UL (ref 4.2–5.4)
RBC BLD AUTO: NORMAL
SODIUM SERPL-SCNC: 139 MMOL/L (ref 135–145)
WBC # BLD AUTO: 18.4 K/UL (ref 4.8–10.8)

## 2022-09-15 PROCEDURE — 85007 BL SMEAR W/DIFF WBC COUNT: CPT

## 2022-09-15 PROCEDURE — 85730 THROMBOPLASTIN TIME PARTIAL: CPT

## 2022-09-15 PROCEDURE — 36415 COLL VENOUS BLD VENIPUNCTURE: CPT

## 2022-09-15 PROCEDURE — 83690 ASSAY OF LIPASE: CPT

## 2022-09-15 PROCEDURE — 99233 SBSQ HOSP IP/OBS HIGH 50: CPT | Performed by: INTERNAL MEDICINE

## 2022-09-15 PROCEDURE — 80053 COMPREHEN METABOLIC PANEL: CPT

## 2022-09-15 PROCEDURE — 700102 HCHG RX REV CODE 250 W/ 637 OVERRIDE(OP): Performed by: STUDENT IN AN ORGANIZED HEALTH CARE EDUCATION/TRAINING PROGRAM

## 2022-09-15 PROCEDURE — A9270 NON-COVERED ITEM OR SERVICE: HCPCS | Performed by: STUDENT IN AN ORGANIZED HEALTH CARE EDUCATION/TRAINING PROGRAM

## 2022-09-15 PROCEDURE — 96372 THER/PROPH/DIAG INJ SC/IM: CPT

## 2022-09-15 PROCEDURE — 700102 HCHG RX REV CODE 250 W/ 637 OVERRIDE(OP): Performed by: INTERNAL MEDICINE

## 2022-09-15 PROCEDURE — A9270 NON-COVERED ITEM OR SERVICE: HCPCS | Performed by: INTERNAL MEDICINE

## 2022-09-15 PROCEDURE — 85018 HEMOGLOBIN: CPT

## 2022-09-15 PROCEDURE — 70450 CT HEAD/BRAIN W/O DYE: CPT

## 2022-09-15 PROCEDURE — 85025 COMPLETE CBC W/AUTO DIFF WBC: CPT

## 2022-09-15 PROCEDURE — 83605 ASSAY OF LACTIC ACID: CPT

## 2022-09-15 PROCEDURE — 85610 PROTHROMBIN TIME: CPT

## 2022-09-15 PROCEDURE — 85014 HEMATOCRIT: CPT

## 2022-09-15 PROCEDURE — 770001 HCHG ROOM/CARE - MED/SURG/GYN PRIV*

## 2022-09-15 PROCEDURE — 700111 HCHG RX REV CODE 636 W/ 250 OVERRIDE (IP): Performed by: INTERNAL MEDICINE

## 2022-09-15 PROCEDURE — 700105 HCHG RX REV CODE 258: Performed by: INTERNAL MEDICINE

## 2022-09-15 RX ORDER — NICOTINE 21 MG/24HR
14 PATCH, TRANSDERMAL 24 HOURS TRANSDERMAL
Status: DISCONTINUED | OUTPATIENT
Start: 2022-09-15 | End: 2022-09-19 | Stop reason: HOSPADM

## 2022-09-15 RX ADMIN — PAROXETINE HYDROCHLORIDE 20 MG: 20 TABLET, FILM COATED ORAL at 05:32

## 2022-09-15 RX ADMIN — NICOTINE 14 MG: 14 PATCH TRANSDERMAL at 08:40

## 2022-09-15 RX ADMIN — SODIUM CHLORIDE: 9 INJECTION, SOLUTION INTRAVENOUS at 05:53

## 2022-09-15 RX ADMIN — OXYCODONE 2.5 MG: 5 TABLET ORAL at 19:51

## 2022-09-15 RX ADMIN — HEPARIN SODIUM 5000 UNITS: 5000 INJECTION, SOLUTION INTRAVENOUS; SUBCUTANEOUS at 05:31

## 2022-09-15 RX ADMIN — OXYCODONE 2.5 MG: 5 TABLET ORAL at 15:36

## 2022-09-15 ASSESSMENT — PAIN DESCRIPTION - PAIN TYPE
TYPE: ACUTE PAIN

## 2022-09-15 ASSESSMENT — LIFESTYLE VARIABLES
HAVE PEOPLE ANNOYED YOU BY CRITICIZING YOUR DRINKING: NO
TOTAL SCORE: 0
EVER HAD A DRINK FIRST THING IN THE MORNING TO STEADY YOUR NERVES TO GET RID OF A HANGOVER: NO
ALCOHOL_USE: NO
HOW MANY TIMES IN THE PAST YEAR HAVE YOU HAD 5 OR MORE DRINKS IN A DAY: 0
ON A TYPICAL DAY WHEN YOU DRINK ALCOHOL HOW MANY DRINKS DO YOU HAVE: 0
TOTAL SCORE: 0
DOES PATIENT WANT TO STOP DRINKING: NO
EVER FELT BAD OR GUILTY ABOUT YOUR DRINKING: NO
TOTAL SCORE: 0
HAVE YOU EVER FELT YOU SHOULD CUT DOWN ON YOUR DRINKING: NO
CONSUMPTION TOTAL: NEGATIVE
AVERAGE NUMBER OF DAYS PER WEEK YOU HAVE A DRINK CONTAINING ALCOHOL: 0

## 2022-09-15 ASSESSMENT — ENCOUNTER SYMPTOMS
HEMOPTYSIS: 0
CLAUDICATION: 0
CHILLS: 0
MYALGIAS: 0
BLURRED VISION: 0
PHOTOPHOBIA: 0
VOMITING: 0
CONSTITUTIONAL NEGATIVE: 1
WEIGHT LOSS: 0
SPEECH CHANGE: 0
CONSTIPATION: 0
DOUBLE VISION: 0
LOSS OF CONSCIOUSNESS: 0
COUGH: 0
DIARRHEA: 0
ORTHOPNEA: 0
FEVER: 0
FALLS: 1
DIZZINESS: 1
ABDOMINAL PAIN: 0
NAUSEA: 0
NECK PAIN: 0
PALPITATIONS: 0
WEAKNESS: 0

## 2022-09-15 ASSESSMENT — FIBROSIS 4 INDEX
FIB4 SCORE: 1.09
FIB4 SCORE: 1.09

## 2022-09-15 NOTE — PROGRESS NOTES
Assumed care from Alisia MORA.  Assessment complete. Plan of care gone over with patient and all concerns were gone over. Patient was resting in bed comfortably. Patient was A&O x 4. Safety precautions in place, call light is in reach, white board updated, and personal belongings are in reach. Patient had no concerns at this time and educated on how to use the call light.

## 2022-09-15 NOTE — PROGRESS NOTES
Patient with internal, continuous baclofen infusion pump.    Pump is currently infusing, per patient. Pharmacy ([facility]) made aware.    Patient's  to bring in information/settings around 1400 today (after work). Pharmacy to update information once  brings info.    Patient administered bolus with Dr. Healy in the room around 0945 today.    Patient able to receive 3 boluses/day PRN.    Will continue to monitor.

## 2022-09-15 NOTE — ASSESSMENT & PLAN NOTE
Due to head trauma and large head laceration with expanding scalp hematoma while on Xarelto  Status post Kcentra for Xarelto reversal at ED  Hold Xarelto/heparin  Check CT chest/abdomen: no acute abnormalities  Monitor CBC and transfuse for hemoglobin less than 7  Trauma surgery on board  Check iron profile, vitB12

## 2022-09-15 NOTE — PROGRESS NOTES
[facility] Medicine Daily Progress Note    Date of Service  9/15/2022    Chief Complaint  Nadia Lazaro is a 57 y.o. female admitted 9/13/2022 with ground level fall and head injury with head laceration    Hospital Course    57-year-old female with history of multiple sclerosis and protein S deficiency with DVTs  on Xarelto presented 9/13 with ground level fall.  Patient states she had an episode of ataxia and dizziness which she attributes to her MS, which led to a ground level fall and head injury resulting in a head laceration. She denied any syncope, palpitations or chest pain.  On admission patient was found to have an 18cm laceration to her left temple and left forehead with significant bleeding and underwent laceration repair in the ER. CT scan did not show any fracture or intracranial bleeding.  Labs showed leukocytosis and elevated lactic acid at 4.5, patient did not need antibiotics, IV fluid was started.  Xarelto was held and patient received IV Kcentra in the ED.  Patient was evaluated by trauma team Dr Blas and recommended admission to medical service with no urgent need for intervention or surgery.      Interval Problem Update  Patient has had a significant drop in hemoglobin from 14.5 on admission to 9.6 yesterday and 8.9 today.  She had been on subcutaneous heparin which I have discontinued.  Continue to hold anticoagulation and DVT prophylaxis due to expanding scalp hematoma and downtrending hemoglobin.  Check INR and APTT.  Continue to monitor CBC and transfuse for hemoglobin less than 7.  I have ordered a stat CT head without contrast for further evaluation of expanding scalp hematoma.  I am also concerned for progression of her MS given her recent episode of ataxia with dizziness which led to a ground-level fall.  I will order MRI brain with and without IV contrast for further evaluation of her MS.  Continue IV fluids  PT and OT evaluation     I have discussed this patient's plan of care  and discharge plan at IDT rounds today with Case Management, Nursing, Nursing leadership, and other members of the IDT team.    Consultants/Specialty  general surgery    Code Status  Full Code    Disposition  Patient is not medically cleared for discharge.   Anticipate discharge to to home with organized home healthcare and close outpatient follow-up.  I have placed the appropriate orders for post-discharge needs.    Review of Systems  Review of Systems   Constitutional: Negative.  Negative for chills, fever and weight loss.   HENT: Negative.  Negative for ear pain, hearing loss and tinnitus.    Eyes:  Negative for blurred vision, double vision and photophobia.   Respiratory:  Negative for cough and hemoptysis.    Cardiovascular:  Negative for chest pain, palpitations, orthopnea and claudication.   Gastrointestinal:  Negative for abdominal pain, constipation, diarrhea, nausea and vomiting.   Genitourinary:  Negative for dysuria, frequency and urgency.   Musculoskeletal:  Positive for falls. Negative for myalgias and neck pain.   Skin:  Negative for rash.        Left temporal head laceration   Neurological:  Positive for dizziness. Negative for speech change, loss of consciousness and weakness.      Physical Exam  Temp:  [36.7 °C (98.1 °F)-37.3 °C (99.1 °F)] 37.3 °C (99.1 °F)  Pulse:  [] 94  Resp:  [15-20] 18  BP: (100-136)/(60-83) 104/75  SpO2:  [94 %-99 %] 99 %    Physical Exam  Vitals and nursing note reviewed.   Constitutional:       General: She is not in acute distress.  HENT:      Head: Normocephalic. Raccoon eyes, left periorbital erythema and laceration present.        Comments: Left temporal head laceration with dried blood     Nose: Nose normal.      Mouth/Throat:      Mouth: Mucous membranes are moist.   Eyes:      General: No scleral icterus.     Extraocular Movements: Extraocular movements intact.      Conjunctiva/sclera: Conjunctivae normal.      Pupils: Pupils are equal, round, and reactive to  light.   Cardiovascular:      Rate and Rhythm: Regular rhythm. Tachycardia present.      Pulses: Normal pulses.      Heart sounds: Normal heart sounds. No murmur heard.  Pulmonary:      Effort: Pulmonary effort is normal. No respiratory distress.      Breath sounds: Normal breath sounds. No wheezing, rhonchi or rales.   Abdominal:      General: Bowel sounds are normal. There is no distension.      Palpations: Abdomen is soft.      Tenderness: There is no abdominal tenderness. There is no guarding.   Musculoskeletal:         General: No swelling, tenderness, deformity or signs of injury. Normal range of motion.      Cervical back: Normal range of motion and neck supple.      Right lower leg: No edema.      Left lower leg: No edema.   Lymphadenopathy:      Cervical: No cervical adenopathy.   Skin:     General: Skin is warm.      Coloration: Skin is not jaundiced.      Findings: No bruising or lesion.   Neurological:      General: No focal deficit present.      Mental Status: She is alert and oriented to person, place, and time.      Cranial Nerves: No cranial nerve deficit.      Sensory: No sensory deficit.      Motor: No weakness.      Coordination: Coordination normal.      Gait: Gait abnormal.   Psychiatric:         Mood and Affect: Mood normal.         Behavior: Behavior normal.       Fluids  No intake or output data in the 24 hours ending 09/15/22 1010    Laboratory  Recent Labs     09/13/22  2226 09/14/22  1133 09/14/22  2052 09/15/22  0007   WBC 25.1* 20.8*  --  18.4*   RBC 4.24 3.39*  --  2.78*   HEMOGLOBIN 13.6 10.9* 9.6* 8.9*   HEMATOCRIT 39.1 31.1* 26.6* 25.7*   MCV 92.2 91.7  --  92.4   MCH 32.1 32.2  --  32.0   MCHC 34.8 35.0  --  34.6   RDW 48.7 49.1  --  49.3   PLATELETCT 339 288  --  222   MPV 10.9 11.1  --  11.0       Recent Labs     09/13/22 2043 09/14/22  1133 09/15/22  0007   SODIUM 141 139 139   POTASSIUM 3.6 4.5 4.0   CHLORIDE 105 107 107   CO2 22 20 21   GLUCOSE 114* 151* 139*   BUN 15 18 17    CREATININE 0.76 0.89 0.94   CALCIUM 9.1 8.3* 8.4*       Recent Labs     09/14/22  0057   APTT 28.3   INR 1.70*                 Imaging  CT-HEAD W/O   Final Result         1. No acute intracranial abnormality. No evidence of acute intracranial hemorrhage or mass lesion.      2. Scalp laceration in the left temple and left forehead               CT-CSPINE WITHOUT PLUS RECONS   Final Result      No acute fracture or dislocation seen in the CT scan of the cervical spine.      CT-HEAD W/O    (Results Pending)   MR-STEALTH BRAIN WITH & W/O    (Results Pending)          Assessment/Plan  * Laceration of head with complication, initial encounter- (present on admission)  Assessment & Plan  Due to ground-level fall related to ataxia and dizziness from MS  CT scan did not show intracranial bleeding  Trauma surgery evaluated the patient and no need for intervention  Repeat CT head for concerns of expanding hematoma  Continue monitoring hemoglobin and continue to hold Xarelto at this time  PT and OT evaluation and placement if needed    Acute blood loss anemia- (present on admission)  Assessment & Plan  Due to head trauma and large head laceration with expanding scalp hematoma while on Xarelto  Status post Kcentra for Xarelto reversal  Hold Xarelto/heparin  Monitor CBC and transfuse for hemoglobin less than 7    Lactic acidosis  Assessment & Plan  Likely related to dehydration  Repeat labs and start with antibiotics if needed    Fall  Assessment & Plan  Due to ataxia and dizziness related to MS  PT and OT and placement if needed    Leukocytosis- (present on admission)  Assessment & Plan  Likely reactive secondary to fall  Procalcitonin is negative and no signs of infection  Repeat CBC  Hold IV antibiotics at this time as patient is not febrile and denies all constitutional symptoms and had mechanical ground-level fall likely inducing her reactive leukocytosis    Protein S deficiency (HCC)- (present on admission)  Assessment &  Plan  Patient has history of multiple DVT and PE   held Xarelto secondary to scalp lesions  Kcentra given in ED  Close monitoring and resume Xarelto once Hb is stable and no further evidence of bleeding/hematoma expansion  Risk/benefit from Xarelto was discussed with the patient and she understand the risk of DVT also risk of bleeding      MS (multiple sclerosis) (HCC)- (present on admission)  Assessment & Plan  With symptoms of ataxia and dizziness, I ordered a MRI brain with and without IV contrast for evaluation of progression of her MS  We will consider consulting neurology  No neurofocal deficit or exacerbation of her symptoms  PT and OT       VTE prophylaxis: SCDs/TEDs and heparin ppx    I have performed a physical exam and reviewed and updated ROS and Plan today (9/15/2022). In review of yesterday's note (9/14/2022), there are no changes except as documented above.

## 2022-09-15 NOTE — CARE PLAN
Problem: Pain - Standard  Goal: Alleviation of pain or a reduction in pain to the patient’s comfort goal  Outcome: Progressing     Problem: Knowledge Deficit - Standard  Goal: Patient and family/care givers will demonstrate understanding of plan of care, disease process/condition, diagnostic tests and medications  Outcome: Progressing     The patient is Stable - Low risk of patient condition declining or worsening    Shift Goals  Clinical Goals: Monitor labs, Monitor swelling in head  Patient Goals: Pain control, Rest    Progress made toward(s) clinical / shift goals:  Patient is understanding of the treatment plan that has been set. Patient is open and understanding with education that is being given. Patient understands the importance of using her call light before trying to get up.     Patient is not progressing towards the following goals:

## 2022-09-15 NOTE — PROGRESS NOTES
Patient refusing WILIAM Hose.    Patient education on importance and reason. Patient verbalized understanding but still does not want WILIAM Hose placed.    Dr. Healy made aware.

## 2022-09-15 NOTE — PROGRESS NOTES
4 Eyes Skin Assessment Completed by ABBY Camacho and ABBY Puente.    Head Bruising and Swelling Laceration with staples to left frontal scalp, swollen and bruised left eye.   Ears WDL  Nose WDL  Mouth WDL  Neck WDL  Breast/Chest WDL  Shoulder Blades WDL  Spine WDL scar  (R) Arm/Elbow/Hand Bruising  (L) Arm/Elbow/Hand Bruising  Abdomen WDL  Groin WDL  Scrotum/Coccyx/Buttocks WDL  (R) Leg Bruising and Edema  (L) Leg Bruising and Edema discoloration  (R) Heel/Foot/Toe WDL  (L) Heel/Foot/Toe WDL          Devices In Places WILIAM's      Interventions In Place Pressure Redistribution Mattress    Possible Skin Injury Yes, head lac    Pictures Uploaded Into Epic Yes  Wound Consult Placed Yes  RN Wound Prevention Protocol Ordered Yes

## 2022-09-15 NOTE — PROGRESS NOTES
4 Eyes Skin Assessment Completed by ABBY Crump and ABBY Beyer.    Head Bruising, Redness, and Edema, Laceration  Ears WDL  Nose WDL  Mouth WDL  Neck WDL  Breast/Chest WDL  Shoulder Blades WDL  Spine WDL  (R) Arm/Elbow/Hand WDL  (L) Arm/Elbow/Hand WDL  Abdomen WDL  Groin WDL  Scrotum/Coccyx/Buttocks WDL  (R) Leg WDL  (L) Leg WDL  (R) Heel/Foot/Toe WDL  (L) Heel/Foot/Toe WDL          Devices In Places Tele Box and Blood Pressure Cuff      Interventions In Place Pillows    Possible Skin Injury No    Pictures Uploaded Into Epic N/A  Wound Consult Placed N/A  RN Wound Prevention Protocol Ordered No

## 2022-09-15 NOTE — DISCHARGE PLANNING
"HTH/SCP TCN chart review completed. Collaborated with LONDON Ramirez. Noted HH choice present in media should it be necessary, [facility] sent following previous TCN visit.    TCN met with patient at bedside.  Patient endorsed her plan is to move to be closer to family in [place], tentative move date \"was supposed to be the 25th (of September).\" Collaborated with attending physician, Dr. Niraj MD to advise of patient plans to move at the end of the month, should this be important given patient current acute hospitalization.      Based on conversation with patient and collaboration with LONDON, no new TCN needs identified. TCN will continue to follow and collaborate with discharge planning team as additional post acute needs arise. Thank you.    Previously completed:  Choice obtained: HH  [facility] referral (sent) 9/14/2022  "

## 2022-09-15 NOTE — CARE PLAN
"Assumed care of patient at shift change.  Patient AAO x 4, pleasant, on RA, no c/o pain. Patient reports \"tightness\" on head.  Head laceration with staples. Dried blood surrounding wound and in hair.  Tele monitor showing NSR.  H&H most recently 8.9.  Patient requesting nicotine patch...will make attending aware during rounds.  Patient reports she has a baclofen pump and is inquiring if she is able to utilize boluses while hospitalized...will ask attending during rounds.  No further needs verbalized at this time.  Call bell and belongings within reach.  Will continue to monitor.          The patient is Watcher - Medium risk of patient condition declining or worsening    Shift Goals  Clinical Goals: Monitor labs, monitor swelling/bruising, pain control  Patient Goals: Pain control, rest  Family Goals: N/A    Progress made toward(s) clinical / shift goals:    Problem: Pain - Standard  Goal: Alleviation of pain or a reduction in pain to the patient’s comfort goal  Outcome: Progressing     Problem: Knowledge Deficit - Standard  Goal: Patient and family/care givers will demonstrate understanding of plan of care, disease process/condition, diagnostic tests and medications  Outcome: Progressing     Problem: Fall Risk  Goal: Patient will remain free from falls  Outcome: Progressing       Patient is not progressing towards the following goals:      "

## 2022-09-16 ENCOUNTER — HOME HEALTH ADMISSION (OUTPATIENT)
Dept: HOME HEALTH SERVICES | Facility: HOME HEALTHCARE | Age: 57
End: 2022-09-16
Payer: MEDICARE

## 2022-09-16 ENCOUNTER — APPOINTMENT (OUTPATIENT)
Dept: RADIOLOGY | Facility: MEDICAL CENTER | Age: 57
DRG: 605 | End: 2022-09-16
Attending: STUDENT IN AN ORGANIZED HEALTH CARE EDUCATION/TRAINING PROGRAM
Payer: MEDICARE

## 2022-09-16 LAB
ANION GAP SERPL CALC-SCNC: 6 MMOL/L (ref 7–16)
BUN SERPL-MCNC: 13 MG/DL (ref 8–22)
CALCIUM SERPL-MCNC: 8 MG/DL (ref 8.5–10.5)
CHLORIDE SERPL-SCNC: 115 MMOL/L (ref 96–112)
CO2 SERPL-SCNC: 23 MMOL/L (ref 20–33)
CREAT SERPL-MCNC: 0.69 MG/DL (ref 0.5–1.4)
ERYTHROCYTE [DISTWIDTH] IN BLOOD BY AUTOMATED COUNT: 50.5 FL (ref 35.9–50)
GFR SERPLBLD CREATININE-BSD FMLA CKD-EPI: 101 ML/MIN/1.73 M 2
GLUCOSE SERPL-MCNC: 98 MG/DL (ref 65–99)
HCT VFR BLD AUTO: 22.3 % (ref 37–47)
HCT VFR BLD AUTO: 23 % (ref 37–47)
HCT VFR BLD AUTO: 24.5 % (ref 37–47)
HCT VFR BLD AUTO: 24.8 % (ref 37–47)
HGB BLD-MCNC: 7.7 G/DL (ref 12–16)
HGB BLD-MCNC: 7.8 G/DL (ref 12–16)
HGB BLD-MCNC: 8.2 G/DL (ref 12–16)
HGB BLD-MCNC: 8.6 G/DL (ref 12–16)
MCH RBC QN AUTO: 32.4 PG (ref 27–33)
MCHC RBC AUTO-ENTMCNC: 34.5 G/DL (ref 33.6–35)
MCV RBC AUTO: 93.7 FL (ref 81.4–97.8)
PLATELET # BLD AUTO: 203 K/UL (ref 164–446)
PMV BLD AUTO: 11 FL (ref 9–12.9)
POTASSIUM SERPL-SCNC: 4.3 MMOL/L (ref 3.6–5.5)
RBC # BLD AUTO: 2.38 M/UL (ref 4.2–5.4)
SODIUM SERPL-SCNC: 144 MMOL/L (ref 135–145)
WBC # BLD AUTO: 10.6 K/UL (ref 4.8–10.8)

## 2022-09-16 PROCEDURE — A9270 NON-COVERED ITEM OR SERVICE: HCPCS | Performed by: STUDENT IN AN ORGANIZED HEALTH CARE EDUCATION/TRAINING PROGRAM

## 2022-09-16 PROCEDURE — 85014 HEMATOCRIT: CPT | Mod: 91

## 2022-09-16 PROCEDURE — 700105 HCHG RX REV CODE 258: Performed by: INTERNAL MEDICINE

## 2022-09-16 PROCEDURE — 700102 HCHG RX REV CODE 250 W/ 637 OVERRIDE(OP): Performed by: STUDENT IN AN ORGANIZED HEALTH CARE EDUCATION/TRAINING PROGRAM

## 2022-09-16 PROCEDURE — A9270 NON-COVERED ITEM OR SERVICE: HCPCS | Performed by: NURSE PRACTITIONER

## 2022-09-16 PROCEDURE — 71260 CT THORAX DX C+: CPT

## 2022-09-16 PROCEDURE — 85018 HEMOGLOBIN: CPT | Mod: 91

## 2022-09-16 PROCEDURE — 85027 COMPLETE CBC AUTOMATED: CPT

## 2022-09-16 PROCEDURE — A9270 NON-COVERED ITEM OR SERVICE: HCPCS | Performed by: INTERNAL MEDICINE

## 2022-09-16 PROCEDURE — 99221 1ST HOSP IP/OBS SF/LOW 40: CPT | Performed by: SURGERY

## 2022-09-16 PROCEDURE — 36415 COLL VENOUS BLD VENIPUNCTURE: CPT

## 2022-09-16 PROCEDURE — 700102 HCHG RX REV CODE 250 W/ 637 OVERRIDE(OP): Performed by: NURSE PRACTITIONER

## 2022-09-16 PROCEDURE — 80048 BASIC METABOLIC PNL TOTAL CA: CPT

## 2022-09-16 PROCEDURE — 700117 HCHG RX CONTRAST REV CODE 255: Performed by: STUDENT IN AN ORGANIZED HEALTH CARE EDUCATION/TRAINING PROGRAM

## 2022-09-16 PROCEDURE — 700102 HCHG RX REV CODE 250 W/ 637 OVERRIDE(OP): Performed by: INTERNAL MEDICINE

## 2022-09-16 PROCEDURE — 99233 SBSQ HOSP IP/OBS HIGH 50: CPT | Performed by: STUDENT IN AN ORGANIZED HEALTH CARE EDUCATION/TRAINING PROGRAM

## 2022-09-16 PROCEDURE — 770001 HCHG ROOM/CARE - MED/SURG/GYN PRIV*

## 2022-09-16 RX ORDER — LORAZEPAM 1 MG/1
1 TABLET ORAL ONCE
Status: COMPLETED | OUTPATIENT
Start: 2022-09-16 | End: 2022-09-16

## 2022-09-16 RX ADMIN — SODIUM CHLORIDE: 9 INJECTION, SOLUTION INTRAVENOUS at 11:47

## 2022-09-16 RX ADMIN — LORAZEPAM 1 MG: 1 TABLET ORAL at 19:50

## 2022-09-16 RX ADMIN — NICOTINE 14 MG: 14 PATCH TRANSDERMAL at 04:06

## 2022-09-16 RX ADMIN — IOHEXOL 100 ML: 350 INJECTION, SOLUTION INTRAVENOUS at 17:30

## 2022-09-16 RX ADMIN — PAROXETINE HYDROCHLORIDE 20 MG: 20 TABLET, FILM COATED ORAL at 04:06

## 2022-09-16 RX ADMIN — OXYCODONE 5 MG: 5 TABLET ORAL at 13:00

## 2022-09-16 RX ADMIN — SODIUM CHLORIDE: 9 INJECTION, SOLUTION INTRAVENOUS at 00:48

## 2022-09-16 RX ADMIN — ACETAMINOPHEN 650 MG: 325 TABLET, FILM COATED ORAL at 00:53

## 2022-09-16 ASSESSMENT — ENCOUNTER SYMPTOMS
DIZZINESS: 1
VOMITING: 0
CONSTIPATION: 0
PALPITATIONS: 0
COUGH: 0
PHOTOPHOBIA: 0
WEAKNESS: 0
CLAUDICATION: 0
SPEECH CHANGE: 0
FALLS: 1
NAUSEA: 0
BLURRED VISION: 0
NECK PAIN: 0
ORTHOPNEA: 0
CHILLS: 0
DIARRHEA: 0
CONSTITUTIONAL NEGATIVE: 1
FEVER: 0
MYALGIAS: 0
ABDOMINAL PAIN: 0
HEMOPTYSIS: 0
LOSS OF CONSCIOUSNESS: 0
WEIGHT LOSS: 0
DOUBLE VISION: 0

## 2022-09-16 ASSESSMENT — PAIN DESCRIPTION - PAIN TYPE
TYPE: ACUTE PAIN
TYPE: ACUTE PAIN

## 2022-09-16 ASSESSMENT — COGNITIVE AND FUNCTIONAL STATUS - GENERAL
SUGGESTED CMS G CODE MODIFIER DAILY ACTIVITY: CH
SUGGESTED CMS G CODE MODIFIER MOBILITY: CH
MOBILITY SCORE: 24
DAILY ACTIVITIY SCORE: 24

## 2022-09-16 NOTE — DISCHARGE PLANNING
Chart review show pt was being treated for medical condition that includes GLF (ground level fall) with head laceration.  Patient was evaluated by trauma team Dr Blas and recommended admission to medical service with no urgent need for intervention or surgery. [facility]  has accept the pt. No DC orders are in place at this time. Pt is not medically cleared at this time. No estimated DC date is available at this time. RN CM will f/u with discharge team as events evolve.

## 2022-09-16 NOTE — CARE PLAN
The patient is Stable - Low risk of patient condition declining or worsening    Shift Goals  Clinical Goals: Monitor H&H and BP, Q4h neuro checks  Patient Goals: Pain control, rest  Family Goals: BOY    Progress made toward(s) clinical / shift goals:      Problem: Pain - Standard  Goal: Alleviation of pain or a reduction in pain to the patient’s comfort goal  Outcome: Progressing   Frequent pain assessments throughout shift. PRN pain medications provided per MAR and pt request. Pharmacological and non-pharmacological interventions utilized.      Problem: Knowledge Deficit - Standard  Goal: Patient and family/care givers will demonstrate understanding of plan of care, disease process/condition, diagnostic tests and medications  Outcome: Progressing   Pt is able to verbalize an understanding of the plan of care.      Problem: Fall Risk  Goal: Patient will remain free from falls  Outcome: Progressing   Patient's bed is locked and in the lowest position. Call light within reach. Bed alarm is on and plugged in. Pt given education on how to use the call light and not get up without calling for assistance. Pt receptive and demonstrates and understanding in teaching.     Problem: Neuro Status  Goal: Neuro status will remain stable or improve  Outcome: Progressing    Q4H neuro checks in place. No changes in the pt's neuro status noted at this time.     Problem: Respiratory  Goal: Patient will achieve/maintain optimum respiratory ventilation and gas exchange  Outcome: Progressing   Pt is on room air, and their oxygen saturation is WDL.     Patient is not progressing towards the following goals:

## 2022-09-16 NOTE — DISCHARGE PLANNING
Received Choice form at 0800  Agency/Facility Name: [facility]  Referral not sent, pending HH order     1130:  DPA received HH order, DPA sent referral to [facility].

## 2022-09-16 NOTE — DISCHARGE PLANNING
ATTN: Case Management  RE: Referral for Home Health    As of 9/16/2022, we have accepted the Home Health referral for the patient listed above.    A [facility] clinician will be out to see the patient within 48 hours. If you have any questions or concerns regarding the patient's transition to Home Health, please do not hesitate to contact us at [phone].      We look forward to collaborating with you,  [facility] Health Team

## 2022-09-16 NOTE — CARE PLAN
Problem: Pain - Standard  Goal: Alleviation of pain or a reduction in pain to the patient’s comfort goal  Outcome: Progressing     Problem: Knowledge Deficit - Standard  Goal: Patient and family/care givers will demonstrate understanding of plan of care, disease process/condition, diagnostic tests and medications  Outcome: Progressing     Problem: Fall Risk  Goal: Patient will remain free from falls  Outcome: Progressing   The patient is Watcher - Medium risk of patient condition declining or worsening    Shift Goals  Clinical Goals: monitor H&H  Patient Goals: rest, keep a positive attitude  Family Goals: BOY    Progress made toward(s) clinical / shift goals:  q6hr H&H draw, monitor trend    Patient is not progressing towards the following goals:

## 2022-09-16 NOTE — DISCHARGE PLANNING
"TCN following. HTH/SCP chart review completed.   Patient seen at bedside, AOx4, scalp laceration site with staples intact, bilateral periorbital swelling noted. Mrb has no additonal health plan related  Questions at this time.    It appears plans for possible HH  was noted in Hospitalists notes 9/16/2022. This TCN Teams messaged  ALEX Estevez to send HH referral out and [facility]      Previous TCN Elzbieta TORRES Noted on 9/15/2022 \" ....Patient endorsed her plan is to move to be closer to family in [place], tentative move date \"was supposed to be the 25th (of September).\" Collaborated with attending physician, Dr. Niraj MD to advise of patient plans to move at the end of the month, should this be important given patient current acute hospitalization.  \"    OT and PT noted on 9/14/2022 that mbr will have no further therapy needs after DC.      Mrbr is not medically cleared for Dc due to ongoing need for medical management.   Anticipate DC to home with  HH services and possible  [facility] services.      Anticipate no additional TCN needs at this time.  TCN will continue to follow and collaborate with discharge planning team as additional post acute needs arise. Thank you.      Previously completed:  OT PT ----> no further therapy needs after DC  Choice obtained: HH  [facility] referral (sent) 9/14/2022        "

## 2022-09-16 NOTE — FACE TO FACE
Face to Face Supporting Documentation - Home Health    The encounter with this patient was in whole or in part the primary reason for home health admission.    Date of encounter:   Patient:                    MRN:                       YOB: 2022  Nadia Lazaro  [id number]  1965     Home health to see patient for:  Skilled Nursing care for assessment, interventions & education, Physical Therapy evaluation and treatment, and Occupational therapy evaluation and treatment    Skilled need for:  New Onset Medical Diagnosis hematoma, fall    Skilled nursing interventions to include:  Comment: debility    Homebound status evidenced by:  Need the aid of supportive devices such as crutches, canes, wheelchairs or walkers. Leaving home requires a considerable and taxing effort. There is a normal inability to leave the home.    Community Physician to provide follow up care: Rica Noonan D.O.     Optional Interventions? No      I certify the face to face encounter for this home health care referral meets the CMS requirements and the encounter/clinical assessment with the patient was, in whole, or in part, for the medical condition(s) listed above, which is the primary reason for home health care. Based on my clinical findings: the service(s) are medically necessary, support the need for home health care, and the homebound criteria are met.  I certify that this patient has had a face to face encounter by myself.  Ras Gee M.D. - NPI: [id number]

## 2022-09-16 NOTE — PROGRESS NOTES
Assumed patient care around 1900. Patient is alert and oriented x 4. Pt being medicate per MAR along with non-pharmacological measues for pain. Bed in lowest position and locked. Call light within reach and bed alarm is set. Hourly rounding continues.     0003 - Called MRI to verify where pt is on MRI list. MRI tech reports pt will likely go to MRI sometime within the next 24 hrs.

## 2022-09-16 NOTE — PROGRESS NOTES
Hospital Medicine Daily Progress Note    Date of Service  9/16/2022    Chief Complaint  Nadia Lazaro is a 57 y.o. female admitted 9/13/2022 with ground level fall and head injury with head laceration    Hospital Course    57-year-old female with history of multiple sclerosis and protein S deficiency with DVTs  and PE on Xarelto presented 9/13 with ground level fall.  Patient states she had an episode of ataxia and dizziness which she attributes to her MS, which led to a ground level fall and head injury resulting in a head laceration. She denied any syncope, palpitations or chest pain.  On admission patient was found to have an 18cm laceration to her left temple and left forehead with significant bleeding and underwent laceration repair in the ER. CT scan did not show any fracture or intracranial bleeding.  Labs showed leukocytosis and elevated lactic acid at 4.5, patient did not need antibiotics, IV fluid was started.  Xarelto was held and patient received IV Kcentra in the ED.  Patient was evaluated by trauma team Dr Blas and recommended admission to medical service with no urgent need for intervention or surgery.    Interval Problem Update  Hb still dropped to 7.8 today. Staples dry, no sign of oozing  I discussed with trauma surgery Dr. Betts. Rec chest and abd CT with contrast. Ordered   Neurological intact.   CT head reviewed noted L scalp hematoma.   Hold xarelto.   Mri pending  [facility] ordered  Continue monitoring Hb    I have discussed this patient's plan of care and discharge plan at IDT rounds today with Case Management, Nursing, Nursing leadership, and other members of the IDT team.    Consultants/Specialty  trauma surgery    Code Status  Full Code    Disposition  Patient is not medically cleared for discharge.   Anticipate discharge to to home with organized home healthcare and close outpatient follow-up.  I have placed the appropriate orders for post-discharge needs.    Review of  Systems  Review of Systems   Constitutional: Negative.  Negative for chills, fever and weight loss.   HENT: Negative.  Negative for ear pain, hearing loss and tinnitus.    Eyes:  Negative for blurred vision, double vision and photophobia.   Respiratory:  Negative for cough and hemoptysis.    Cardiovascular:  Negative for chest pain, palpitations, orthopnea and claudication.   Gastrointestinal:  Negative for abdominal pain, constipation, diarrhea, nausea and vomiting.   Genitourinary:  Negative for dysuria, frequency and urgency.   Musculoskeletal:  Positive for falls. Negative for myalgias and neck pain.   Skin:  Negative for rash.        Left temporal head laceration   Neurological:  Positive for dizziness. Negative for speech change, loss of consciousness and weakness.      Physical Exam  Temp:  [36.8 °C (98.2 °F)-37.2 °C (99 °F)] 36.8 °C (98.2 °F)  Pulse:  [] 95  Resp:  [16-17] 16  BP: ()/(70-78) 130/78  SpO2:  [94 %-97 %] 97 %    Physical Exam  Vitals and nursing note reviewed.   Constitutional:       General: She is not in acute distress.  HENT:      Head: Normocephalic. Raccoon eyes, left periorbital erythema and laceration present.        Comments: Left temporal head laceration with staples, hematoma     Nose: Nose normal.      Mouth/Throat:      Mouth: Mucous membranes are moist.   Eyes:      General: No scleral icterus.     Extraocular Movements: Extraocular movements intact.      Conjunctiva/sclera: Conjunctivae normal.      Pupils: Pupils are equal, round, and reactive to light.   Cardiovascular:      Rate and Rhythm: Normal rate and regular rhythm.      Pulses: Normal pulses.      Heart sounds: Normal heart sounds. No murmur heard.  Pulmonary:      Effort: Pulmonary effort is normal. No respiratory distress.      Breath sounds: Normal breath sounds. No wheezing, rhonchi or rales.   Abdominal:      General: Bowel sounds are normal. There is no distension.      Palpations: Abdomen is soft.       Tenderness: There is no abdominal tenderness. There is no guarding.   Musculoskeletal:         General: No swelling, tenderness, deformity or signs of injury. Normal range of motion.      Cervical back: Normal range of motion and neck supple.      Right lower leg: No edema.      Left lower leg: No edema.   Lymphadenopathy:      Cervical: No cervical adenopathy.   Skin:     General: Skin is warm.      Coloration: Skin is not jaundiced.      Findings: No bruising or lesion.   Neurological:      General: No focal deficit present.      Mental Status: She is alert and oriented to person, place, and time.      Cranial Nerves: No cranial nerve deficit.      Sensory: No sensory deficit.      Motor: No weakness.      Coordination: Coordination normal.      Gait: Gait abnormal.   Psychiatric:         Mood and Affect: Mood normal.         Behavior: Behavior normal.       Fluids  No intake or output data in the 24 hours ending 09/16/22 1207    Laboratory  Recent Labs     09/14/22  1133 09/14/22  2052 09/15/22  0007 09/15/22  1150 09/15/22  2059 09/16/22  0140 09/16/22  0610   WBC 20.8*  --  18.4*  --   --  10.6  --    RBC 3.39*  --  2.78*  --   --  2.38*  --    HEMOGLOBIN 10.9*   < > 8.9*   < > 8.4* 7.7* 7.8*   HEMATOCRIT 31.1*   < > 25.7*   < > 24.8* 22.3* 23.0*   MCV 91.7  --  92.4  --   --  93.7  --    MCH 32.2  --  32.0  --   --  32.4  --    MCHC 35.0  --  34.6  --   --  34.5  --    RDW 49.1  --  49.3  --   --  50.5*  --    PLATELETCT 288  --  222  --   --  203  --    MPV 11.1  --  11.0  --   --  11.0  --     < > = values in this interval not displayed.       Recent Labs     09/14/22  1133 09/15/22  0007 09/16/22  0140   SODIUM 139 139 144   POTASSIUM 4.5 4.0 4.3   CHLORIDE 107 107 115*   CO2 20 21 23   GLUCOSE 151* 139* 98   BUN 18 17 13   CREATININE 0.89 0.94 0.69   CALCIUM 8.3* 8.4* 8.0*       Recent Labs     09/14/22  0057 09/15/22  1150   APTT 28.3 27.5   INR 1.70* 1.02                 Imaging  CT-HEAD W/O   Final  Result      1.  No acute intracranial abnormality.   2.  Prominent LEFT high frontoparietal scalp hematoma, new from prior exam.  Overlying skin staples present.         CT-HEAD W/O   Final Result         1. No acute intracranial abnormality. No evidence of acute intracranial hemorrhage or mass lesion.      2. Scalp laceration in the left temple and left forehead               CT-CSPINE WITHOUT PLUS RECONS   Final Result      No acute fracture or dislocation seen in the CT scan of the cervical spine.      MR-STEALTH BRAIN WITH & W/O    (Results Pending)          Assessment/Plan  * Laceration of head with complication, initial encounter- (present on admission)  Assessment & Plan  Due to ground-level fall related to ataxia and dizziness from MS  CT scan did not show intracranial bleeding  Trauma surgery evaluated the patient and no need for intervention  Repeated CT head L high frontoparietal scalp hematoma  Continue monitoring hemoglobin and continue to hold Xarelto at this time  PT and OT evaluation rec [facility], ordered     Acute blood loss anemia- (present on admission)  Assessment & Plan  Due to head trauma and large head laceration with expanding scalp hematoma while on Xarelto  Status post Kcentra for Xarelto reversal  Hold Xarelto/heparin  Monitor CBC and transfuse for hemoglobin less than 7  Consulted trauma surgery, ordered CT chest/abdomen with contrast     Lactic acidosis  Assessment & Plan  Likely related to dehydration  Repeat labs and start with antibiotics if needed    Fall  Assessment & Plan  Due to ataxia and dizziness related to MS  PT and OT and placement if needed    Leukocytosis- (present on admission)  Assessment & Plan  Likely reactive secondary to fall  Procalcitonin is negative and no signs of infection  Repeat CBC  Hold IV antibiotics at this time as patient is not febrile and denies all constitutional symptoms and had mechanical ground-level fall likely inducing her reactive  leukocytosis    Protein S deficiency (HCC)- (present on admission)  Assessment & Plan  Patient has history of multiple DVT and PE   held Xarelto secondary to scalp lesions  Kcentra given in ED  Close monitoring and resume Xarelto once Hb is stable and no further evidence of bleeding/hematoma expansion  Risk/benefit from Xarelto was discussed with the patient and she understand the risk of DVT also risk of bleeding      MS (multiple sclerosis) (HCC)- (present on admission)  Assessment & Plan  With symptoms of ataxia and dizziness, I ordered a MRI brain with and without IV contrast for evaluation of progression of her MS  She has neurology to follow as outpatient  No neurofocal deficit or exacerbation of her symptoms  PT and OT       VTE prophylaxis: SCDs/TEDs    I have performed a physical exam and reviewed and updated ROS and Plan today (9/16/2022). In review of yesterday's note (9/15/2022), there are no changes except as documented above.

## 2022-09-16 NOTE — PROGRESS NOTES
Paged Mile Means at 0233 regarding pt's hemoglobin and hematocrit, which was trending down. Latest hemoglobin dropped from 8.4 to 7.7. New orders placed by Mile were q6h hemoglobin and hematocrit lab draws and occult stool sample.     Pt has large bruise to left hip. Outlined the bruise in sharpie with date and time to monitor if the bruise is increasing in size.

## 2022-09-17 LAB
ANION GAP SERPL CALC-SCNC: 7 MMOL/L (ref 7–16)
BUN SERPL-MCNC: 10 MG/DL (ref 8–22)
CALCIUM SERPL-MCNC: 8 MG/DL (ref 8.5–10.5)
CHLORIDE SERPL-SCNC: 114 MMOL/L (ref 96–112)
CO2 SERPL-SCNC: 21 MMOL/L (ref 20–33)
CREAT SERPL-MCNC: 0.73 MG/DL (ref 0.5–1.4)
GFR SERPLBLD CREATININE-BSD FMLA CKD-EPI: 96 ML/MIN/1.73 M 2
GLUCOSE SERPL-MCNC: 91 MG/DL (ref 65–99)
HCT VFR BLD AUTO: 23.5 % (ref 37–47)
HCT VFR BLD AUTO: 24.2 % (ref 37–47)
HCT VFR BLD AUTO: 25.2 % (ref 37–47)
HGB BLD-MCNC: 7.9 G/DL (ref 12–16)
HGB BLD-MCNC: 8.1 G/DL (ref 12–16)
HGB BLD-MCNC: 8.1 G/DL (ref 12–16)
LIPASE SERPL-CCNC: 47 U/L (ref 11–82)
POTASSIUM SERPL-SCNC: 3.9 MMOL/L (ref 3.6–5.5)
SODIUM SERPL-SCNC: 142 MMOL/L (ref 135–145)

## 2022-09-17 PROCEDURE — 99232 SBSQ HOSP IP/OBS MODERATE 35: CPT | Performed by: PHYSICIAN ASSISTANT

## 2022-09-17 PROCEDURE — 85018 HEMOGLOBIN: CPT

## 2022-09-17 PROCEDURE — 700102 HCHG RX REV CODE 250 W/ 637 OVERRIDE(OP): Performed by: STUDENT IN AN ORGANIZED HEALTH CARE EDUCATION/TRAINING PROGRAM

## 2022-09-17 PROCEDURE — 85014 HEMATOCRIT: CPT

## 2022-09-17 PROCEDURE — A9270 NON-COVERED ITEM OR SERVICE: HCPCS | Performed by: STUDENT IN AN ORGANIZED HEALTH CARE EDUCATION/TRAINING PROGRAM

## 2022-09-17 PROCEDURE — 770001 HCHG ROOM/CARE - MED/SURG/GYN PRIV*

## 2022-09-17 PROCEDURE — 80048 BASIC METABOLIC PNL TOTAL CA: CPT

## 2022-09-17 PROCEDURE — 36415 COLL VENOUS BLD VENIPUNCTURE: CPT

## 2022-09-17 PROCEDURE — A9270 NON-COVERED ITEM OR SERVICE: HCPCS | Performed by: INTERNAL MEDICINE

## 2022-09-17 PROCEDURE — 700102 HCHG RX REV CODE 250 W/ 637 OVERRIDE(OP): Performed by: INTERNAL MEDICINE

## 2022-09-17 PROCEDURE — 99233 SBSQ HOSP IP/OBS HIGH 50: CPT | Performed by: STUDENT IN AN ORGANIZED HEALTH CARE EDUCATION/TRAINING PROGRAM

## 2022-09-17 PROCEDURE — 83690 ASSAY OF LIPASE: CPT

## 2022-09-17 RX ADMIN — PAROXETINE HYDROCHLORIDE 20 MG: 20 TABLET, FILM COATED ORAL at 05:57

## 2022-09-17 RX ADMIN — NICOTINE 14 MG: 14 PATCH TRANSDERMAL at 05:57

## 2022-09-17 RX ADMIN — OXYCODONE 5 MG: 5 TABLET ORAL at 14:04

## 2022-09-17 RX ADMIN — OXYCODONE 5 MG: 5 TABLET ORAL at 20:45

## 2022-09-17 ASSESSMENT — FIBROSIS 4 INDEX: FIB4 SCORE: 1.19

## 2022-09-17 ASSESSMENT — ENCOUNTER SYMPTOMS
NECK PAIN: 0
ABDOMINAL PAIN: 0
HEADACHES: 1
CONSTIPATION: 0
VOMITING: 0
MYALGIAS: 0
CLAUDICATION: 0
HEMOPTYSIS: 0
CONSTITUTIONAL NEGATIVE: 1
FALLS: 1
WEIGHT LOSS: 0
FEVER: 0
WEAKNESS: 0
PALPITATIONS: 0
ORTHOPNEA: 0
DOUBLE VISION: 0
BLURRED VISION: 0
COUGH: 0
SPEECH CHANGE: 0
DIARRHEA: 0
CHILLS: 0
LOSS OF CONSCIOUSNESS: 0
PHOTOPHOBIA: 0
NAUSEA: 0
DIZZINESS: 0
DIZZINESS: 1

## 2022-09-17 ASSESSMENT — PAIN DESCRIPTION - PAIN TYPE
TYPE: ACUTE PAIN
TYPE: ACUTE PAIN

## 2022-09-17 NOTE — PROGRESS NOTES
Trauma / Surgical Daily Progress Note    Date of Service  9/17/2022    Chief Complaint  57 y.o. female admitted 9/13/2022 with scalp laceration and anemia after ground-level fall.        Interval Events  Compression dressing placed last night. No change in hematoma.   Hemoglobin stable.     - Continue to monitor hematoma  - Will evaluate for possible hematoma evacuation and scalp wound exploration tomorrow AM  - NPO at midnight   - Hold Xarelto  - Trauma surgery will continue to follow     Review of Systems  Review of Systems   Constitutional:  Negative for chills and fever.   Eyes:  Negative for blurred vision and double vision.   Gastrointestinal:  Negative for nausea and vomiting.   Neurological:  Positive for headaches. Negative for dizziness.      Vital Signs  Temp:  [36.7 °C (98.1 °F)-36.9 °C (98.4 °F)] 36.9 °C (98.4 °F)  Pulse:  [77-88] 77  Resp:  [16-18] 18  BP: (120-135)/(63-75) 120/63  SpO2:  [96 %-97 %] 96 %    Physical Exam  Physical Exam  Vitals and nursing note reviewed.   Constitutional:       General: She is not in acute distress.     Appearance: She is not ill-appearing.   HENT:      Head: Normocephalic.      Comments: Large laceration of the left frontal temporal parietal scalp with large but very soft underlying hematoma, staples in place.  Bilateral periorbital ecchymosis  Cardiovascular:      Rate and Rhythm: Normal rate.   Pulmonary:      Effort: Pulmonary effort is normal. No respiratory distress.   Musculoskeletal:      Comments: Moves all extremities   Neurological:      Mental Status: She is alert and oriented to person, place, and time.      GCS: GCS eye subscore is 4. GCS verbal subscore is 5. GCS motor subscore is 6.   Psychiatric:         Behavior: Behavior normal.       Laboratory  Recent Results (from the past 24 hour(s))   HEMOGLOBIN AND HEMATOCRIT    Collection Time: 09/16/22 12:12 PM   Result Value Ref Range    Hemoglobin 8.6 (L) 12.0 - 16.0 g/dL    Hematocrit 24.8 (L) 37.0 -  47.0 %   HEMOGLOBIN AND HEMATOCRIT    Collection Time: 09/16/22  6:09 PM   Result Value Ref Range    Hemoglobin 8.2 (L) 12.0 - 16.0 g/dL    Hematocrit 24.5 (L) 37.0 - 47.0 %   HEMOGLOBIN AND HEMATOCRIT    Collection Time: 09/17/22 12:40 AM   Result Value Ref Range    Hemoglobin 7.9 (L) 12.0 - 16.0 g/dL    Hematocrit 23.5 (L) 37.0 - 47.0 %   LIPASE    Collection Time: 09/17/22 12:40 AM   Result Value Ref Range    Lipase 47 11 - 82 U/L   Basic Metabolic Panel    Collection Time: 09/17/22 12:40 AM   Result Value Ref Range    Sodium 142 135 - 145 mmol/L    Potassium 3.9 3.6 - 5.5 mmol/L    Chloride 114 (H) 96 - 112 mmol/L    Co2 21 20 - 33 mmol/L    Glucose 91 65 - 99 mg/dL    Bun 10 8 - 22 mg/dL    Creatinine 0.73 0.50 - 1.40 mg/dL    Calcium 8.0 (L) 8.5 - 10.5 mg/dL    Anion Gap 7.0 7.0 - 16.0   ESTIMATED GFR    Collection Time: 09/17/22 12:40 AM   Result Value Ref Range    GFR (CKD-EPI) 96 >60 mL/min/1.73 m 2   HEMOGLOBIN AND HEMATOCRIT    Collection Time: 09/17/22  5:48 AM   Result Value Ref Range    Hemoglobin 8.1 (L) 12.0 - 16.0 g/dL    Hematocrit 25.2 (L) 37.0 - 47.0 %       Fluids    Intake/Output Summary (Last 24 hours) at 9/17/2022 1008  Last data filed at 9/17/2022 0814  Gross per 24 hour   Intake 140 ml   Output --   Net 140 ml       Assessment/Plan  * Laceration of head with complication, initial encounter- (present on admission)  Assessment & Plan  Scalp laceration with hematoma.  9/15 Head CT without abnormality.  Repaired with staples.  Compression dressing.  9/16 Evaluate for possible hematoma evacuation and scalp wound exploration tomorrow  - NPO at midnight      Discussed patient condition with RN, Patient, and trauma surgery. Dr. George Betts

## 2022-09-17 NOTE — CONSULTS
CHIEF COMPLAINT: Scalp laceration and anemia after ground-level fall.     Consult requested by Dr. Gee from the hospitalist service    HISTORY OF PRESENT ILLNESS: The patient is a 57 year-old White woman history of MS who was admitted by the medical service on 14 September after fall where she struck her head sustaining significant scalp laceration.  Patient has a protein S deficiency and is chronically anticoagulated on Xarelto.  That was reversed at the time of admission.  At that time she had a negative head CT and laceration was closed in the emergency department so she was admitted by the medical service.  Patient has had a significant drop in her hemoglobin during the course of her hospitalization and repeat head CT done today showed a relatively large hematoma underneath her scalp flap.  Formal trauma consultation is requested to evaluate patient for the source of her anemia as well as when we may be able to restart her anticoagulation.    PAST MEDICAL HISTORY:  has a past medical history of Anesthesia, Hyperlipidemia (1/3/2019), Indigestion (1/3/2019), Major depressive disorder with single episode, in full remission ([facility]) (5/17/2018), MS (multiple sclerosis) ([facility]), Protein S deficiency ([facility]), and Smoker (1/3/2019).    PAST SURGICAL HISTORY:  has a past surgical history that includes pump insert/remove (8/24/2010); pump insert/remove (10/19/2010); other (2010); other (2016); hysterectomy robotic xi (N/A, 11/15/2018); salpingectomy (Bilateral, 11/15/2018); and oophorectomy (Bilateral, 11/15/2018).    ALLERGIES:   Allergies   Allergen Reactions    Nubain [Nalbuphine Hcl]      Seizures         CURRENT MEDICATIONS:   Home Medications       Reviewed by Jelani Mo (Pharmacy Tech) on 09/14/22 at 1109  Med List Status: Complete     Medication Last Dose Status   alendronate (FOSAMAX) 70 MG Tab 9/7/2022 Active   Biotin 1000 MCG Chew Tab 9/12/2022 Active   calcium carbonate (TUMS) 500 MG Chew Tab 9/12/2022  Active   diphenhydrAMINE (BENADRYL) 25 MG Tab PRN Active   docosahexanoic acid (OMEGA 3 FA) 1000 MG Cap 9/12/2022 Active   magnesium oxide (MAG-OX) 400 (241.3 Mg) MG Tab tablet 9/12/2022 Active   Natalizumab (TYSABRI IV) 8/31/2022 Active   Pain Pump (PATIENT SUPPLIED) XX LEXI Continuous Active   PARoxetine (PAXIL) 20 MG Tab 9/13/2022 Active   rivaroxaban (XARELTO) 20 MG Tab tablet 9/13/2022 Active   vitamin D (CHOLECALCIFEROL) 1000 UNIT TABS 9/12/2022 Active                    FAMILY HISTORY: family history includes Hypertension in an other family member; Lung Disease in her father; No Known Problems in her child, mother, and sister.    SOCIAL HISTORY:  reports that she has been smoking cigarettes. She has a 20.00 pack-year smoking history. She has never used smokeless tobacco. She reports that she does not drink alcohol and does not use drugs.    REVIEW OF SYSTEMS: Review of systems is remarkable for the following chronic spasticity and unsteady gait.  Anxiety and headache. The remainder of the comprehensive ROS is negative with the exception of the aforementioned HPI, PMH, and PSH bullets in accordance with CMS guideline.    PHYSICAL EXAMINATION:      Vital Signs: /72   Pulse 88   Temp 36.7 °C (98.1 °F) (Temporal)   Resp 16   Wt 94.9 kg (209 lb 3.5 oz)   SpO2 96%   Physical Exam  Vitals and nursing note reviewed.   HENT:      Head:      Comments: Large laceration of the left frontal temporal parietal scalp with large but very soft underlying hematoma.  Compression wrap placed.  Bilateral periorbital ecchymosis noted     Nose: Nose normal.      Mouth/Throat:      Mouth: Mucous membranes are moist.      Pharynx: Oropharynx is clear.   Eyes:      Extraocular Movements: Extraocular movements intact.      Pupils: Pupils are equal, round, and reactive to light.   Cardiovascular:      Rate and Rhythm: Normal rate and regular rhythm.      Pulses: Normal pulses.   Pulmonary:      Effort: Pulmonary effort is  normal. No respiratory distress.      Breath sounds: No stridor. No wheezing.   Abdominal:      General: There is no distension.      Palpations: Abdomen is soft.      Tenderness: There is no abdominal tenderness.   Musculoskeletal:         General: No tenderness, deformity or signs of injury. Normal range of motion.      Cervical back: Normal range of motion and neck supple. No tenderness.   Skin:     General: Skin is warm and dry.      Coloration: Skin is not pale.   Neurological:      General: No focal deficit present.      Mental Status: She is alert and oriented to person, place, and time.      Sensory: No sensory deficit.      Motor: No weakness.       LABORATORY VALUES:   Recent Labs     09/14/22  1133 09/14/22  2052 09/15/22  0007 09/15/22  1150 09/16/22  0140 09/16/22  0610 09/16/22  1212   WBC 20.8*  --  18.4*  --  10.6  --   --    RBC 3.39*  --  2.78*  --  2.38*  --   --    HEMOGLOBIN 10.9*   < > 8.9*   < > 7.7* 7.8* 8.6*   HEMATOCRIT 31.1*   < > 25.7*   < > 22.3* 23.0* 24.8*   MCV 91.7  --  92.4  --  93.7  --   --    MCH 32.2  --  32.0  --  32.4  --   --    MCHC 35.0  --  34.6  --  34.5  --   --    RDW 49.1  --  49.3  --  50.5*  --   --    PLATELETCT 288  --  222  --  203  --   --    MPV 11.1  --  11.0  --  11.0  --   --     < > = values in this interval not displayed.     Recent Labs     09/14/22  1133 09/15/22  0007 09/16/22  0140   SODIUM 139 139 144   POTASSIUM 4.5 4.0 4.3   CHLORIDE 107 107 115*   CO2 20 21 23   GLUCOSE 151* 139* 98   BUN 18 17 13   CREATININE 0.89 0.94 0.69   CALCIUM 8.3* 8.4* 8.0*     Recent Labs     09/13/22 2043 09/14/22  0057 09/15/22  0007 09/15/22  1150   ASTSGOT 17  --  12  --    ALTSGPT 10  --  8  --    TBILIRUBIN 0.3  --  0.3  --    ALKPHOSPHAT 114*  --  74  --    GLOBULIN 2.5  --  1.8*  --    INR  --  1.70*  --  1.02     Recent Labs     09/14/22  0057 09/15/22  1150   APTT 28.3 27.5   INR 1.70* 1.02        IMAGING:   CT-CHEST,ABDOMEN,PELVIS WITH   Final Result         1.  No acute traumatic change in the chest, abdomen or pelvis.      2. No aortic dissection or aneurysm identified      CT-HEAD W/O   Final Result      1.  No acute intracranial abnormality.   2.  Prominent LEFT high frontoparietal scalp hematoma, new from prior exam.  Overlying skin staples present.         CT-HEAD W/O   Final Result         1. No acute intracranial abnormality. No evidence of acute intracranial hemorrhage or mass lesion.      2. Scalp laceration in the left temple and left forehead               CT-CSPINE WITHOUT PLUS RECONS   Final Result      No acute fracture or dislocation seen in the CT scan of the cervical spine.      MR-STEALTH BRAIN WITH & W/O    (Results Pending)       ASSESSMENT AND PLAN:   57-year-old female with MS status post ground-level fall with scalp laceration closed in the emergency department who has subsequently developed a significant scalp hematoma and anemia over the course of her hospitalization despite reversal of her anticoagulation with Kcentra    CT scan of the chest abdomen pelvis showed no sign of missed injury or soft tissue hematoma    Follow-up CT of the head shows what appears to be hyperacute hematoma underneath her scalp flap.  No sign of basilar skull fracture, orbital fracture or midface fractures on repeat CT of head    Hematoma is relatively soft so compression dressing was placed.  Will monitor for about 48 hours and if there is no worsening of the hematoma then we will try to to re-start Xarelto before discharge home.  If there is worsening of the hematoma or bleeding on Xarelto then scalp wound exploration would be indicated.    Will follow      Laceration of head with complication, initial encounter  Due to ground-level fall related to ataxia and dizziness from MS  CT scan did not show intracranial bleeding  Trauma surgery evaluated the patient and no need for intervention  Repeated CT head L high frontoparietal scalp hematoma  Continue monitoring  hemoglobin and continue to hold Xarelto at this time  PT and OT evaluation rec [facility], ordered     Protein S deficiency (HCC)  Patient has history of multiple DVT and PE   held Xarelto secondary to scalp lesions  Kcentra given in ED  Close monitoring and resume Xarelto once Hb is stable and no further evidence of bleeding/hematoma expansion  Risk/benefit from Xarelto was discussed with the patient and she understand the risk of DVT also risk of bleeding      MS (multiple sclerosis) (HCC)  With symptoms of ataxia and dizziness, I ordered a MRI brain with and without IV contrast for evaluation of progression of her MS  She has neurology to follow as outpatient  No neurofocal deficit or exacerbation of her symptoms  PT and OT    Leukocytosis  Likely reactive secondary to fall  Procalcitonin is negative and no signs of infection  Repeat CBC  Hold IV antibiotics at this time as patient is not febrile and denies all constitutional symptoms and had mechanical ground-level fall likely inducing her reactive leukocytosis    Fall  Due to ataxia and dizziness related to MS  PT and OT and placement if needed    Lactic acidosis  Likely related to dehydration  Repeat labs and start with antibiotics if needed    Acute blood loss anemia  Due to head trauma and large head laceration with expanding scalp hematoma while on Xarelto  Status post Kcentra for Xarelto reversal  Hold Xarelto/heparin  Monitor CBC and transfuse for hemoglobin less than 7  Consulted trauma surgery, ordered CT chest/abdomen with contrast          ____________________________________     George Betts D.O.    DD: 9/16/2022  6:03 PM

## 2022-09-17 NOTE — CARE PLAN
The patient is Stable - Low risk of patient condition declining or worsening    Shift Goals  Clinical Goals: Monitor H/H trends  Patient Goals: Rest/Decrease Anxiety  Family Goals: BOY    Progress made toward(s) clinical / shift goals: H/H trends being monitored.     Problem: Mobility  Goal: Patient's capacity to carry out activities will improve  Outcome: Progressing  Note: Patient is steady with FWW     Problem: Knowledge Deficit - Standard  Goal: Patient and family/care givers will demonstrate understanding of plan of care, disease process/condition, diagnostic tests and medications  Outcome: Progressing     Patient is not progressing towards the following goals:N/A

## 2022-09-17 NOTE — ASSESSMENT & PLAN NOTE
Scalp laceration with hematoma.  9/15 Head CT without abnormality.  Repaired with staples.  Compression dressing.  9/17 No indication for surgical intervention at this time.  - Recommend restarting anticoagulation  9/18 Hgb stable. No change in hematoma.  Staple removal after 14 days. Follow up in office.  Surgical team will sign off.

## 2022-09-17 NOTE — CARE PLAN
Problem: Bowel Elimination  Goal: Establish and maintain regular bowel function  Outcome: Progressing     Problem: Mobility  Goal: Patient's capacity to carry out activities will improve  Outcome: Progressing   The patient is Watcher - Medium risk of patient condition declining or worsening    Shift Goals  Clinical Goals: Stable Hemoglobin this shift  Patient Goals: shower  Family Goals: BOY    Progress made toward(s) clinical / shift goals:  more independence with ADL's    Patient is not progressing towards the following goals:

## 2022-09-17 NOTE — WOUND TEAM
Wound consult received regarding patient head laceration, which is now stapled. No advanced wound care needs for closed stapled laceration. Updated bedside RN Brit. Wound team signing off at this time. Please call for questions or concerns.

## 2022-09-17 NOTE — PROGRESS NOTES
Hospital Medicine Daily Progress Note    Date of Service  9/17/2022    Chief Complaint  Nadia Lazaro is a 57 y.o. female admitted 9/13/2022 with ground level fall and head injury with head laceration    Hospital Course    57-year-old female with history of multiple sclerosis and protein S deficiency with DVTs  and PE on Xarelto presented 9/13 with ground level fall.  Patient states she had an episode of ataxia and dizziness which she attributes to her MS, which led to a ground level fall and head injury resulting in a head laceration. She denied any syncope, palpitations or chest pain.  On admission patient was found to have an 18cm laceration to her left temple and left forehead with significant bleeding and underwent laceration repair in the ER. CT scan did not show any fracture or intracranial bleeding.  Labs showed leukocytosis and elevated lactic acid at 4.5, patient did not need antibiotics, IV fluid was started.  Xarelto was held and patient received IV Kcentra in the ED.  Patient was evaluated by trauma team Dr Blas and recommended admission to medical service with no urgent need for intervention or surgery.    Patient's Hb was noted to drop. Trauma surgery consulted. CT abdomen/chest no acute abnormalities.     Interval Problem Update  Consulted trauma surgery Dr. Betts yesterday.   CT chest/abd reviewed  Hb stable.   Mri pending  Trauma surgery plan possible hematoma evacuation and scalp wound exploration tomorrow  NPO MN    I have discussed this patient's plan of care and discharge plan at IDT rounds today with Case Management, Nursing, Nursing leadership, and other members of the IDT team.    Consultants/Specialty  trauma surgery    Code Status  Full Code    Disposition  Patient is not medically cleared for discharge.   Anticipate discharge to to home with organized home healthcare and close outpatient follow-up.  I have placed the appropriate orders for post-discharge needs.    Review of  Systems  Review of Systems   Constitutional: Negative.  Negative for chills, fever and weight loss.   HENT: Negative.  Negative for ear pain, hearing loss and tinnitus.    Eyes:  Negative for blurred vision, double vision and photophobia.   Respiratory:  Negative for cough and hemoptysis.    Cardiovascular:  Negative for chest pain, palpitations, orthopnea and claudication.   Gastrointestinal:  Negative for abdominal pain, constipation, diarrhea, nausea and vomiting.   Genitourinary:  Negative for dysuria, frequency and urgency.   Musculoskeletal:  Positive for falls. Negative for myalgias and neck pain.   Skin:  Negative for rash.        Left temporal head laceration   Neurological:  Positive for dizziness. Negative for speech change, loss of consciousness and weakness.      Physical Exam  Temp:  [36.7 °C (98.1 °F)-36.9 °C (98.4 °F)] 36.9 °C (98.4 °F)  Pulse:  [77-88] 77  Resp:  [16-18] 18  BP: (120-135)/(63-75) 120/63  SpO2:  [96 %-97 %] 96 %    Physical Exam  Vitals and nursing note reviewed.   Constitutional:       General: She is not in acute distress.  HENT:      Head: Normocephalic. Raccoon eyes, left periorbital erythema and laceration present.        Comments: Left temporal head laceration with staples, hematoma, staples in place     Nose: Nose normal.      Mouth/Throat:      Mouth: Mucous membranes are moist.   Eyes:      General: No scleral icterus.     Extraocular Movements: Extraocular movements intact.      Conjunctiva/sclera: Conjunctivae normal.      Pupils: Pupils are equal, round, and reactive to light.   Cardiovascular:      Rate and Rhythm: Normal rate and regular rhythm.      Pulses: Normal pulses.      Heart sounds: Normal heart sounds. No murmur heard.  Pulmonary:      Effort: Pulmonary effort is normal. No respiratory distress.      Breath sounds: Normal breath sounds. No wheezing, rhonchi or rales.   Abdominal:      General: Bowel sounds are normal. There is no distension.      Palpations:  Abdomen is soft.      Tenderness: There is no abdominal tenderness. There is no guarding.   Musculoskeletal:         General: No swelling, tenderness, deformity or signs of injury. Normal range of motion.      Cervical back: Normal range of motion and neck supple.      Right lower leg: No edema.      Left lower leg: No edema.   Lymphadenopathy:      Cervical: No cervical adenopathy.   Skin:     General: Skin is warm.      Coloration: Skin is not jaundiced.      Findings: No bruising or lesion.   Neurological:      General: No focal deficit present.      Mental Status: She is alert and oriented to person, place, and time.      Cranial Nerves: No cranial nerve deficit.      Sensory: No sensory deficit.      Motor: No weakness.      Coordination: Coordination normal.      Gait: Gait abnormal.   Psychiatric:         Mood and Affect: Mood normal.         Behavior: Behavior normal.       Fluids    Intake/Output Summary (Last 24 hours) at 9/17/2022 1147  Last data filed at 9/17/2022 0814  Gross per 24 hour   Intake 140 ml   Output --   Net 140 ml       Laboratory  Recent Labs     09/15/22  0007 09/15/22  1150 09/16/22  0140 09/16/22  0610 09/16/22  1809 09/17/22  0040 09/17/22  0548   WBC 18.4*  --  10.6  --   --   --   --    RBC 2.78*  --  2.38*  --   --   --   --    HEMOGLOBIN 8.9*   < > 7.7*   < > 8.2* 7.9* 8.1*   HEMATOCRIT 25.7*   < > 22.3*   < > 24.5* 23.5* 25.2*   MCV 92.4  --  93.7  --   --   --   --    MCH 32.0  --  32.4  --   --   --   --    MCHC 34.6  --  34.5  --   --   --   --    RDW 49.3  --  50.5*  --   --   --   --    PLATELETCT 222  --  203  --   --   --   --    MPV 11.0  --  11.0  --   --   --   --     < > = values in this interval not displayed.       Recent Labs     09/15/22  0007 09/16/22  0140 09/17/22  0040   SODIUM 139 144 142   POTASSIUM 4.0 4.3 3.9   CHLORIDE 107 115* 114*   CO2 21 23 21   GLUCOSE 139* 98 91   BUN 17 13 10   CREATININE 0.94 0.69 0.73   CALCIUM 8.4* 8.0* 8.0*       Recent Labs      09/15/22  1150   APTT 27.5   INR 1.02                 Imaging  CT-CHEST,ABDOMEN,PELVIS WITH   Final Result         1. No acute traumatic change in the chest, abdomen or pelvis.      2. No aortic dissection or aneurysm identified      CT-HEAD W/O   Final Result      1.  No acute intracranial abnormality.   2.  Prominent LEFT high frontoparietal scalp hematoma, new from prior exam.  Overlying skin staples present.         CT-HEAD W/O   Final Result         1. No acute intracranial abnormality. No evidence of acute intracranial hemorrhage or mass lesion.      2. Scalp laceration in the left temple and left forehead               CT-CSPINE WITHOUT PLUS RECONS   Final Result      No acute fracture or dislocation seen in the CT scan of the cervical spine.      MR-STEALTH BRAIN WITH & W/O    (Results Pending)          Assessment/Plan  * Laceration of head with complication, initial encounter- (present on admission)  Assessment & Plan  Due to ground-level fall related to ataxia and dizziness from MS  CT scan did not show intracranial bleeding  Repeated CT head L high frontoparietal scalp hematoma  Continue monitoring hemoglobin and continue to hold Xarelto at this time  Trauma surgery on board, possible hematoma evacuation and scalp wound exploration  PT and OT evaluation rec [facility], ordered     Acute blood loss anemia- (present on admission)  Assessment & Plan  Due to head trauma and large head laceration with expanding scalp hematoma while on Xarelto  Status post Kcentra for Xarelto reversal at ED  Hold Xarelto/heparin  Check CT chest/abdomen: no acute abnormalities  Monitor CBC and transfuse for hemoglobin less than 7  Trauma surgery on board, possible hematoma evacuation and scalp wound exploration    Lactic acidosis  Assessment & Plan  Likely related to dehydration  resolved    Fall  Assessment & Plan  Due to ataxia and dizziness related to MS  PT and OT    Leukocytosis- (present on admission)  Assessment & Plan  Likely  reactive secondary to fall  Procalcitonin is negative and no signs of infection  Repeat CBC  Hold IV antibiotics at this time as patient is not febrile and denies all constitutional symptoms and had mechanical ground-level fall likely inducing her reactive leukocytosis    Protein S deficiency (HCC)- (present on admission)  Assessment & Plan  Patient has history of multiple DVT and PE   held Xarelto secondary to scalp lesions  Kcentra given in ED  Trauma surgery on board, if no further bleeding noted, will plan to resume Xarelto prior discharge      MS (multiple sclerosis) (HCC)- (present on admission)  Assessment & Plan  With symptoms of ataxia and dizziness, I ordered a MRI brain with and without IV contrast for evaluation of progression of her MS  She has neurology to follow as outpatient  No neurofocal deficit or exacerbation of her symptoms  PT and OT       VTE prophylaxis: SCDs/TEDs    I have performed a physical exam and reviewed and updated ROS and Plan today (9/17/2022). In review of yesterday's note (9/16/2022), there are no changes except as documented above.

## 2022-09-18 LAB
ANION GAP SERPL CALC-SCNC: 8 MMOL/L (ref 7–16)
BUN SERPL-MCNC: 9 MG/DL (ref 8–22)
CALCIUM SERPL-MCNC: 8 MG/DL (ref 8.5–10.5)
CHLORIDE SERPL-SCNC: 111 MMOL/L (ref 96–112)
CO2 SERPL-SCNC: 22 MMOL/L (ref 20–33)
CREAT SERPL-MCNC: 0.67 MG/DL (ref 0.5–1.4)
ERYTHROCYTE [DISTWIDTH] IN BLOOD BY AUTOMATED COUNT: 50.4 FL (ref 35.9–50)
FERRITIN SERPL-MCNC: 98.1 NG/ML (ref 10–291)
GFR SERPLBLD CREATININE-BSD FMLA CKD-EPI: 102 ML/MIN/1.73 M 2
GLUCOSE SERPL-MCNC: 97 MG/DL (ref 65–99)
HCT VFR BLD AUTO: 23 % (ref 37–47)
HGB BLD-MCNC: 8.1 G/DL (ref 12–16)
IRON SATN MFR SERPL: 20 % (ref 15–55)
IRON SERPL-MCNC: 41 UG/DL (ref 40–170)
MCH RBC QN AUTO: 32.7 PG (ref 27–33)
MCHC RBC AUTO-ENTMCNC: 35.2 G/DL (ref 33.6–35)
MCV RBC AUTO: 92.7 FL (ref 81.4–97.8)
PLATELET # BLD AUTO: 229 K/UL (ref 164–446)
PMV BLD AUTO: 11.1 FL (ref 9–12.9)
POTASSIUM SERPL-SCNC: 3.7 MMOL/L (ref 3.6–5.5)
RBC # BLD AUTO: 2.48 M/UL (ref 4.2–5.4)
SODIUM SERPL-SCNC: 141 MMOL/L (ref 135–145)
TIBC SERPL-MCNC: 206 UG/DL (ref 250–450)
UIBC SERPL-MCNC: 165 UG/DL (ref 110–370)
VIT B12 SERPL-MCNC: 206 PG/ML (ref 211–911)
WBC # BLD AUTO: 10.5 K/UL (ref 4.8–10.8)

## 2022-09-18 PROCEDURE — 82728 ASSAY OF FERRITIN: CPT

## 2022-09-18 PROCEDURE — 82607 VITAMIN B-12: CPT

## 2022-09-18 PROCEDURE — 700102 HCHG RX REV CODE 250 W/ 637 OVERRIDE(OP): Performed by: STUDENT IN AN ORGANIZED HEALTH CARE EDUCATION/TRAINING PROGRAM

## 2022-09-18 PROCEDURE — 83550 IRON BINDING TEST: CPT

## 2022-09-18 PROCEDURE — 80048 BASIC METABOLIC PNL TOTAL CA: CPT

## 2022-09-18 PROCEDURE — A9270 NON-COVERED ITEM OR SERVICE: HCPCS | Performed by: STUDENT IN AN ORGANIZED HEALTH CARE EDUCATION/TRAINING PROGRAM

## 2022-09-18 PROCEDURE — 99232 SBSQ HOSP IP/OBS MODERATE 35: CPT | Performed by: PHYSICIAN ASSISTANT

## 2022-09-18 PROCEDURE — 99232 SBSQ HOSP IP/OBS MODERATE 35: CPT | Performed by: STUDENT IN AN ORGANIZED HEALTH CARE EDUCATION/TRAINING PROGRAM

## 2022-09-18 PROCEDURE — 700102 HCHG RX REV CODE 250 W/ 637 OVERRIDE(OP): Performed by: INTERNAL MEDICINE

## 2022-09-18 PROCEDURE — 770001 HCHG ROOM/CARE - MED/SURG/GYN PRIV*

## 2022-09-18 PROCEDURE — 85027 COMPLETE CBC AUTOMATED: CPT

## 2022-09-18 PROCEDURE — A9270 NON-COVERED ITEM OR SERVICE: HCPCS | Performed by: INTERNAL MEDICINE

## 2022-09-18 PROCEDURE — 83540 ASSAY OF IRON: CPT

## 2022-09-18 RX ORDER — ENOXAPARIN SODIUM 100 MG/ML
1 INJECTION SUBCUTANEOUS EVERY 12 HOURS
Status: DISCONTINUED | OUTPATIENT
Start: 2022-09-19 | End: 2022-09-18

## 2022-09-18 RX ADMIN — ACETAMINOPHEN 650 MG: 325 TABLET, FILM COATED ORAL at 12:36

## 2022-09-18 RX ADMIN — RIVAROXABAN 20 MG: 20 TABLET, FILM COATED ORAL at 17:55

## 2022-09-18 RX ADMIN — OXYCODONE 5 MG: 5 TABLET ORAL at 09:39

## 2022-09-18 RX ADMIN — NICOTINE 14 MG: 14 PATCH TRANSDERMAL at 05:06

## 2022-09-18 RX ADMIN — ACETAMINOPHEN 650 MG: 325 TABLET, FILM COATED ORAL at 23:16

## 2022-09-18 RX ADMIN — PAROXETINE HYDROCHLORIDE 20 MG: 20 TABLET, FILM COATED ORAL at 05:06

## 2022-09-18 RX ADMIN — ACETAMINOPHEN 650 MG: 325 TABLET, FILM COATED ORAL at 16:46

## 2022-09-18 ASSESSMENT — PAIN DESCRIPTION - PAIN TYPE
TYPE: ACUTE PAIN

## 2022-09-18 ASSESSMENT — ENCOUNTER SYMPTOMS
WEIGHT LOSS: 0
NECK PAIN: 0
MYALGIAS: 1
CONSTIPATION: 0
VOMITING: 0
PALPITATIONS: 0
DIZZINESS: 0
PHOTOPHOBIA: 0
MYALGIAS: 0
NAUSEA: 0
DIZZINESS: 1
ORTHOPNEA: 0
BLURRED VISION: 0
CLAUDICATION: 0
HEMOPTYSIS: 0
LOSS OF CONSCIOUSNESS: 0
FALLS: 1
CONSTITUTIONAL NEGATIVE: 1
HEADACHES: 1
CHILLS: 0
FEVER: 0
SPEECH CHANGE: 0
DOUBLE VISION: 0
COUGH: 0
WEAKNESS: 0
DIARRHEA: 0
ABDOMINAL PAIN: 0

## 2022-09-18 NOTE — PROGRESS NOTES
Pt is A&Ox4.  No family is at bedside. VSS.  C/O left sided head pain.  Will give pain meds per orders.  Pt MOJICA = 5/5, SBA w/ FWW up to BR. Bed alarm in place.  N/T from the waist down, not new states it is from multiple sclerosis. Dressing to left head is CDI. Telemonitor in place. Pt updated on POC, needs met and questions answered. Calls appropriately. Call light within reach and working properly.

## 2022-09-18 NOTE — PROGRESS NOTES
Hospital Medicine Daily Progress Note    Date of Service  9/18/2022    Chief Complaint  Nadia Lazaro is a 57 y.o. female admitted 9/13/2022 with ground level fall and head injury with head laceration    Hospital Course    57-year-old female with history of multiple sclerosis and protein S deficiency with DVTs  and PE on Xarelto presented 9/13 with ground level fall.  Patient states she had an episode of ataxia and dizziness which she attributes to her MS, which led to a ground level fall and head injury resulting in a head laceration. She denied any syncope, palpitations or chest pain.  On admission patient was found to have an 18cm laceration to her left temple and left forehead with significant bleeding and underwent laceration repair in the ER. CT scan did not show any fracture or intracranial bleeding.  Labs showed leukocytosis and elevated lactic acid at 4.5, patient did not need antibiotics, IV fluid was started.  Xarelto was held and patient received IV Kcentra in the ED.  Patient was evaluated by trauma team Dr Blas and recommended admission to medical service with no urgent need for intervention or surgery.    Patient's Hb was noted to drop. Trauma surgery consulted. CT abdomen/chest no acute abnormalities. No interventions from surgery. Recommend restarting Xarelto tonight 9/18    Interval Problem Update  Trauma surgery following  No surgical intervention  Hb stable  Check iron profile and vitB12  Mri pending  Start xarelto tonight per surgery rec    I have discussed this patient's plan of care and discharge plan at IDT rounds today with Case Management, Nursing, Nursing leadership, and other members of the IDT team.    Consultants/Specialty  trauma surgery    Code Status  Full Code    Disposition  Patient is not medically cleared for discharge.   Anticipate discharge to to home with organized home healthcare and close outpatient follow-up.  I have placed the appropriate orders for  post-discharge needs.    Review of Systems  Review of Systems   Constitutional: Negative.  Negative for chills, fever and weight loss.   HENT: Negative.  Negative for ear pain, hearing loss and tinnitus.    Eyes:  Negative for blurred vision, double vision and photophobia.   Respiratory:  Negative for cough and hemoptysis.    Cardiovascular:  Negative for chest pain, palpitations, orthopnea and claudication.   Gastrointestinal:  Negative for abdominal pain, constipation, diarrhea, nausea and vomiting.   Genitourinary:  Negative for dysuria, frequency and urgency.   Musculoskeletal:  Positive for falls. Negative for myalgias and neck pain.   Skin:  Negative for rash.        Left temporal head laceration   Neurological:  Positive for dizziness. Negative for speech change, loss of consciousness and weakness.      Physical Exam  Temp:  [36.7 °C (98.1 °F)-36.8 °C (98.2 °F)] 36.8 °C (98.2 °F)  Pulse:  [] 76  Resp:  [16-18] 16  BP: (115-133)/(71-94) 123/81  SpO2:  [97 %-100 %] 98 %    Physical Exam  Vitals and nursing note reviewed.   Constitutional:       General: She is not in acute distress.  HENT:      Head: Normocephalic. Raccoon eyes, left periorbital erythema and laceration present.        Comments: Left temporal head laceration with staples, hematoma, staples in place     Nose: Nose normal.      Mouth/Throat:      Mouth: Mucous membranes are moist.   Eyes:      General: No scleral icterus.     Extraocular Movements: Extraocular movements intact.      Conjunctiva/sclera: Conjunctivae normal.      Pupils: Pupils are equal, round, and reactive to light.   Cardiovascular:      Rate and Rhythm: Normal rate and regular rhythm.      Pulses: Normal pulses.      Heart sounds: Normal heart sounds. No murmur heard.  Pulmonary:      Effort: Pulmonary effort is normal. No respiratory distress.      Breath sounds: Normal breath sounds. No wheezing, rhonchi or rales.   Abdominal:      General: Bowel sounds are normal. There  is no distension.      Palpations: Abdomen is soft.      Tenderness: There is no abdominal tenderness. There is no guarding.   Musculoskeletal:         General: No swelling, tenderness, deformity or signs of injury. Normal range of motion.      Cervical back: Normal range of motion and neck supple.      Right lower leg: No edema.      Left lower leg: No edema.   Lymphadenopathy:      Cervical: No cervical adenopathy.   Skin:     General: Skin is warm.      Coloration: Skin is not jaundiced.      Findings: No bruising or lesion.   Neurological:      General: No focal deficit present.      Mental Status: She is alert and oriented to person, place, and time.      Cranial Nerves: No cranial nerve deficit.      Sensory: No sensory deficit.      Motor: No weakness.      Coordination: Coordination normal.      Gait: Gait abnormal.   Psychiatric:         Mood and Affect: Mood normal.         Behavior: Behavior normal.       Fluids    Intake/Output Summary (Last 24 hours) at 9/18/2022 1144  Last data filed at 9/17/2022 1800  Gross per 24 hour   Intake 480 ml   Output --   Net 480 ml         Laboratory  Recent Labs     09/16/22  0140 09/16/22  0610 09/17/22  0548 09/17/22  1522 09/18/22  0249   WBC 10.6  --   --   --  10.5   RBC 2.38*  --   --   --  2.48*   HEMOGLOBIN 7.7*   < > 8.1* 8.1* 8.1*   HEMATOCRIT 22.3*   < > 25.2* 24.2* 23.0*   MCV 93.7  --   --   --  92.7   MCH 32.4  --   --   --  32.7   MCHC 34.5  --   --   --  35.2*   RDW 50.5*  --   --   --  50.4*   PLATELETCT 203  --   --   --  229   MPV 11.0  --   --   --  11.1    < > = values in this interval not displayed.       Recent Labs     09/16/22  0140 09/17/22  0040 09/18/22  0249   SODIUM 144 142 141   POTASSIUM 4.3 3.9 3.7   CHLORIDE 115* 114* 111   CO2 23 21 22   GLUCOSE 98 91 97   BUN 13 10 9   CREATININE 0.69 0.73 0.67   CALCIUM 8.0* 8.0* 8.0*       Recent Labs     09/15/22  1150   APTT 27.5   INR 1.02                 Imaging  CT-CHEST,ABDOMEN,PELVIS WITH    Final Result         1. No acute traumatic change in the chest, abdomen or pelvis.      2. No aortic dissection or aneurysm identified      CT-HEAD W/O   Final Result      1.  No acute intracranial abnormality.   2.  Prominent LEFT high frontoparietal scalp hematoma, new from prior exam.  Overlying skin staples present.         CT-HEAD W/O   Final Result         1. No acute intracranial abnormality. No evidence of acute intracranial hemorrhage or mass lesion.      2. Scalp laceration in the left temple and left forehead               CT-CSPINE WITHOUT PLUS RECONS   Final Result      No acute fracture or dislocation seen in the CT scan of the cervical spine.      MR-STEALTH BRAIN WITH & W/O    (Results Pending)          Assessment/Plan  * Laceration of head with complication, initial encounter- (present on admission)  Assessment & Plan  Due to ground-level fall related to ataxia and dizziness from MS  CT scan did not show intracranial bleeding  Repeated CT head L high frontoparietal scalp hematoma  Continue monitoring hemoglobin and continue to hold Xarelto at this time  Trauma surgery on board, possible hematoma evacuation and scalp wound exploration  PT and OT evaluation rec [facility], ordered     Acute blood loss anemia- (present on admission)  Assessment & Plan  Due to head trauma and large head laceration with expanding scalp hematoma while on Xarelto  Status post Kcentra for Xarelto reversal at ED  Hold Xarelto/heparin  Check CT chest/abdomen: no acute abnormalities  Monitor CBC and transfuse for hemoglobin less than 7  Trauma surgery on board  Check iron profile, vitB12    Lactic acidosis  Assessment & Plan  Likely related to dehydration  resolved    Fall  Assessment & Plan  Due to ataxia and dizziness related to MS  PT and OT    Leukocytosis- (present on admission)  Assessment & Plan  Likely reactive secondary to fall  Procalcitonin is negative and no signs of infection  Repeat CBC  Hold IV antibiotics at this  time as patient is not febrile and denies all constitutional symptoms and had mechanical ground-level fall likely inducing her reactive leukocytosis    Protein S deficiency (HCC)- (present on admission)  Assessment & Plan  Patient has history of multiple DVT and PE   held Xarelto secondary to scalp lesions  Kcentra given in ED  Trauma surgery on board, rec starting xarelto 9/18 night      MS (multiple sclerosis) (HCC)- (present on admission)  Assessment & Plan  With symptoms of ataxia and dizziness, I ordered a MRI brain with and without IV contrast for evaluation of progression of her MS  She has neurology to follow as outpatient  No neurofocal deficit or exacerbation of her symptoms  PT and OT       VTE prophylaxis: SCDs/TEDs    I have performed a physical exam and reviewed and updated ROS and Plan today (9/18/2022). In review of yesterday's note (9/17/2022), there are no changes except as documented above.

## 2022-09-18 NOTE — PROGRESS NOTES
Assumed care of patient, received report from night shift RN. Trauma providers at bedside, no surgery today, dc NPO orders and order regular diet.

## 2022-09-18 NOTE — CARE PLAN
The patient is Watcher - Medium risk of patient condition declining or worsening    Shift Goals  Clinical Goals: Free of falls, ambulate  Patient Goals: discharge home  Family Goals: No family present at this time    Progress made toward(s) clinical / shift goals:  Increase ambulation, up to chair for meals    Patient is not progressing towards the following goals:      Problem: Pain - Standard  Goal: Alleviation of pain or a reduction in pain to the patient’s comfort goal  Outcome: Progressing  Note: Instruct pt to notify staff when pain levels are rising or not resolving so that treatment may be given.       Problem: Fall Risk  Goal: Patient will remain free from falls  Outcome: Progressing  Note: Instruct pt/family to notify staff when needing to ambulate to reduce fall risk.

## 2022-09-18 NOTE — CARE PLAN
The patient is Stable - Low risk of patient condition declining or worsening    Shift Goals  Clinical Goals: Stable hemoglobin this shift  Patient Goals: Pain control, rest  Family Goals: BOY    Progress made toward(s) clinical / shift goals:   Problem: Pain - Standard  Goal: Alleviation of pain or a reduction in pain to the patient’s comfort goal  9/17/2022 2202 by Samra Torre R.N.  Outcome: Progressing  Note: Pain assessed Q2h. Pain medication given per orders, see MAR.    9/17/2022 2202 by Samra Torre R.N.  Outcome: Progressing     Problem: Hemodynamics  Goal: Patient's hemodynamics, fluid balance and neurologic status will be stable or improve  Outcome: Progressing  Note: Neurological status is at baseline. Continuing to monitor Hemoglobin and Hematocrit levels, stable at present.

## 2022-09-18 NOTE — PROGRESS NOTES
Trauma / Surgical Daily Progress Note    Date of Service  9/18/2022    Chief Complaint  57 y.o. female admitted 9/13/2022 with scalp laceration and anemia after ground-level fall.     Interval Events  Hemoglobin stable. No change in hematoma.     - Iron studies and replacement if indicated  - No indication for surgical intervention at this time  - Recommend restarting Xarelto tonight   - Surgical team will continue to follow     Review of Systems  Review of Systems   Constitutional:  Negative for chills and fever.   Eyes:  Negative for blurred vision and double vision.   Gastrointestinal:  Negative for nausea and vomiting.   Musculoskeletal:  Positive for myalgias.   Neurological:  Positive for headaches. Negative for dizziness.      Vital Signs  Temp:  [36.7 °C (98.1 °F)-36.8 °C (98.2 °F)] 36.8 °C (98.2 °F)  Pulse:  [] 76  Resp:  [16-18] 16  BP: (115-133)/(71-94) 123/81  SpO2:  [97 %-100 %] 98 %    Physical Exam  Physical Exam  Vitals and nursing note reviewed.   Constitutional:       General: She is not in acute distress.     Appearance: She is not ill-appearing.   HENT:      Head: Normocephalic.      Comments: Large laceration of the left frontal temporal parietal scalp with large but very soft underlying hematoma, staples in place.  Bilateral periorbital ecchymosis  Cardiovascular:      Rate and Rhythm: Normal rate.   Pulmonary:      Effort: Pulmonary effort is normal. No respiratory distress.   Musculoskeletal:      Comments: Moves all extremities   Neurological:      Mental Status: She is alert and oriented to person, place, and time.      GCS: GCS eye subscore is 4. GCS verbal subscore is 5. GCS motor subscore is 6.   Psychiatric:         Behavior: Behavior normal.       Laboratory  Recent Results (from the past 24 hour(s))   HEMOGLOBIN AND HEMATOCRIT    Collection Time: 09/17/22  3:22 PM   Result Value Ref Range    Hemoglobin 8.1 (L) 12.0 - 16.0 g/dL    Hematocrit 24.2 (L) 37.0 - 47.0 %   CBC WITHOUT  DIFFERENTIAL    Collection Time: 09/18/22  2:49 AM   Result Value Ref Range    WBC 10.5 4.8 - 10.8 K/uL    RBC 2.48 (L) 4.20 - 5.40 M/uL    Hemoglobin 8.1 (L) 12.0 - 16.0 g/dL    Hematocrit 23.0 (L) 37.0 - 47.0 %    MCV 92.7 81.4 - 97.8 fL    MCH 32.7 27.0 - 33.0 pg    MCHC 35.2 (H) 33.6 - 35.0 g/dL    RDW 50.4 (H) 35.9 - 50.0 fL    Platelet Count 229 164 - 446 K/uL    MPV 11.1 9.0 - 12.9 fL   Basic Metabolic Panel    Collection Time: 09/18/22  2:49 AM   Result Value Ref Range    Sodium 141 135 - 145 mmol/L    Potassium 3.7 3.6 - 5.5 mmol/L    Chloride 111 96 - 112 mmol/L    Co2 22 20 - 33 mmol/L    Glucose 97 65 - 99 mg/dL    Bun 9 8 - 22 mg/dL    Creatinine 0.67 0.50 - 1.40 mg/dL    Calcium 8.0 (L) 8.5 - 10.5 mg/dL    Anion Gap 8.0 7.0 - 16.0   ESTIMATED GFR    Collection Time: 09/18/22  2:49 AM   Result Value Ref Range    GFR (CKD-EPI) 102 >60 mL/min/1.73 m 2       Fluids    Intake/Output Summary (Last 24 hours) at 9/18/2022 0949  Last data filed at 9/17/2022 1800  Gross per 24 hour   Intake 480 ml   Output --   Net 480 ml       Assessment/Plan  * Laceration of head with complication, initial encounter- (present on admission)  Assessment & Plan  Scalp laceration with hematoma.  9/15 Head CT without abnormality.  Repaired with staples.  Compression dressing.  9/17 No indication for surgical intervention at this time.  - Recommend restarting anticoagulation tonight  Surgical team will continue to follow      Discussed patient condition with RN, Patient, and hospitalist and trauma surgery. Dr. George Betts.

## 2022-09-19 ENCOUNTER — APPOINTMENT (OUTPATIENT)
Dept: RADIOLOGY | Facility: MEDICAL CENTER | Age: 57
DRG: 605 | End: 2022-09-19
Attending: STUDENT IN AN ORGANIZED HEALTH CARE EDUCATION/TRAINING PROGRAM
Payer: MEDICARE

## 2022-09-19 ENCOUNTER — PHARMACY VISIT (OUTPATIENT)
Dept: PHARMACY | Facility: MEDICAL CENTER | Age: 57
End: 2022-09-19
Payer: MEDICARE

## 2022-09-19 VITALS
HEART RATE: 82 BPM | WEIGHT: 220.68 LBS | DIASTOLIC BLOOD PRESSURE: 87 MMHG | BODY MASS INDEX: 32.59 KG/M2 | SYSTOLIC BLOOD PRESSURE: 126 MMHG | RESPIRATION RATE: 18 BRPM | OXYGEN SATURATION: 95 % | TEMPERATURE: 97.9 F

## 2022-09-19 LAB
ANION GAP SERPL CALC-SCNC: 6 MMOL/L (ref 7–16)
BUN SERPL-MCNC: 13 MG/DL (ref 8–22)
CALCIUM SERPL-MCNC: 8.1 MG/DL (ref 8.5–10.5)
CHLORIDE SERPL-SCNC: 113 MMOL/L (ref 96–112)
CO2 SERPL-SCNC: 24 MMOL/L (ref 20–33)
CREAT SERPL-MCNC: 0.8 MG/DL (ref 0.5–1.4)
ERYTHROCYTE [DISTWIDTH] IN BLOOD BY AUTOMATED COUNT: 51.6 FL (ref 35.9–50)
GFR SERPLBLD CREATININE-BSD FMLA CKD-EPI: 86 ML/MIN/1.73 M 2
GLUCOSE SERPL-MCNC: 104 MG/DL (ref 65–99)
HCT VFR BLD AUTO: 24.3 % (ref 37–47)
HGB BLD-MCNC: 8.1 G/DL (ref 12–16)
MCH RBC QN AUTO: 31.8 PG (ref 27–33)
MCHC RBC AUTO-ENTMCNC: 33.3 G/DL (ref 33.6–35)
MCV RBC AUTO: 95.3 FL (ref 81.4–97.8)
PLATELET # BLD AUTO: 252 K/UL (ref 164–446)
PMV BLD AUTO: 10.7 FL (ref 9–12.9)
POTASSIUM SERPL-SCNC: 4.2 MMOL/L (ref 3.6–5.5)
RBC # BLD AUTO: 2.55 M/UL (ref 4.2–5.4)
SODIUM SERPL-SCNC: 143 MMOL/L (ref 135–145)
WBC # BLD AUTO: 9.2 K/UL (ref 4.8–10.8)

## 2022-09-19 PROCEDURE — 99239 HOSP IP/OBS DSCHRG MGMT >30: CPT | Performed by: INTERNAL MEDICINE

## 2022-09-19 PROCEDURE — 700102 HCHG RX REV CODE 250 W/ 637 OVERRIDE(OP): Performed by: STUDENT IN AN ORGANIZED HEALTH CARE EDUCATION/TRAINING PROGRAM

## 2022-09-19 PROCEDURE — A9270 NON-COVERED ITEM OR SERVICE: HCPCS | Performed by: STUDENT IN AN ORGANIZED HEALTH CARE EDUCATION/TRAINING PROGRAM

## 2022-09-19 PROCEDURE — A9576 INJ PROHANCE MULTIPACK: HCPCS | Performed by: INTERNAL MEDICINE

## 2022-09-19 PROCEDURE — 85027 COMPLETE CBC AUTOMATED: CPT

## 2022-09-19 PROCEDURE — 99232 SBSQ HOSP IP/OBS MODERATE 35: CPT | Performed by: PHYSICIAN ASSISTANT

## 2022-09-19 PROCEDURE — 700117 HCHG RX CONTRAST REV CODE 255: Performed by: INTERNAL MEDICINE

## 2022-09-19 PROCEDURE — RXMED WILLOW AMBULATORY MEDICATION CHARGE: Performed by: INTERNAL MEDICINE

## 2022-09-19 PROCEDURE — 70553 MRI BRAIN STEM W/O & W/DYE: CPT

## 2022-09-19 PROCEDURE — 80048 BASIC METABOLIC PNL TOTAL CA: CPT

## 2022-09-19 PROCEDURE — 700102 HCHG RX REV CODE 250 W/ 637 OVERRIDE(OP): Performed by: INTERNAL MEDICINE

## 2022-09-19 PROCEDURE — A9270 NON-COVERED ITEM OR SERVICE: HCPCS | Performed by: INTERNAL MEDICINE

## 2022-09-19 RX ORDER — OXYCODONE HYDROCHLORIDE 5 MG/1
5 TABLET ORAL EVERY 8 HOURS PRN
Qty: 9 TABLET | Refills: 0 | Status: SHIPPED | OUTPATIENT
Start: 2022-09-19 | End: 2022-09-22

## 2022-09-19 RX ADMIN — PAROXETINE HYDROCHLORIDE 20 MG: 20 TABLET, FILM COATED ORAL at 05:20

## 2022-09-19 RX ADMIN — NICOTINE 14 MG: 14 PATCH TRANSDERMAL at 05:20

## 2022-09-19 RX ADMIN — GADOTERIDOL 20 ML: 279.3 INJECTION, SOLUTION INTRAVENOUS at 09:27

## 2022-09-19 ASSESSMENT — PAIN DESCRIPTION - PAIN TYPE
TYPE: ACUTE PAIN
TYPE: ACUTE PAIN;CHRONIC PAIN
TYPE: ACUTE PAIN

## 2022-09-19 ASSESSMENT — ENCOUNTER SYMPTOMS
MYALGIAS: 1
HEADACHES: 1
FEVER: 0
DOUBLE VISION: 0
NAUSEA: 0
VOMITING: 0
CHILLS: 0
DIZZINESS: 0
BLURRED VISION: 0

## 2022-09-19 NOTE — DISCHARGE PLANNING
TCN following. HTH/SCP chart review completed. TCN collaborated with [employer]. Patient seen  at bedside ,appears comfortable and doing well. Mrb verbalizes that she wishes to be discharged to day as she has an appoitnment in AM at 11:30 AM to receive her dose of TYSABRI-- TCN notified CM immediately.  Mbr has no additional health plan related questions at this time.  Anticipate possible DC today to home with [facility]  and [facility] services.  [facility]  accepted mbr on 9/16/2022    Previously completed:  OT PT ----> no further therapy needs after DC  Choice obtained:   [facility] referral (sent) 9/14/2022

## 2022-09-19 NOTE — PROGRESS NOTES
Arrive to dc lounge via wheelchair. A/O, dc instructions reviewed w/ pt, verbalized understanding. Waiting for meds ot bed.

## 2022-09-19 NOTE — DISCHARGE SUMMARY
Discharge Summary    CHIEF COMPLAINT ON ADMISSION  Chief Complaint   Patient presents with    T-5000 Head Injury     The pt was moving boxes and tripped over treadmill. Pt sustained 2-3 inch avulsion to head. Dressing in place to control bleeding       Reason for Admission  tbi     Admission Date  9/13/2022    CODE STATUS  Full Code    HPI & HOSPITAL COURSE  This is a 57 y.o. female with history of multiple sclerosis and protein S deficiency with DVTs  and PE on Xarelto presented 9/13 with ground level fall.  Patient states she had an episode of ataxia and dizziness which she attributes to her MS, which led to a ground level fall and head injury resulting in a head laceration. She denied any syncope, palpitations or chest pain.  On admission patient was found to have an 18cm laceration to her left temple and left forehead with significant bleeding and underwent laceration repair in the ER. CT scan did not show any fracture or intracranial bleeding.  Labs showed leukocytosis and elevated lactic acid at 4.5, patient did not need antibiotics, IV fluid was started.  Xarelto was held and patient received IV Kcentra in the ED.  Patient was evaluated by trauma team Dr Blas and recommended admission to medical service with no urgent need for intervention or surgery.   Patient's Hb was noted to drop. Trauma surgery consulted. CT abdomen/chest no acute abnormalities. No interventions from surgery. Recommend restarting Xarelto on 9/18. Hemoglobin has remained stable   Patient now doing better trauma surgery has signed off.  Patient will be discharged home today     The patient met 2-midnight criteria for an inpatient stay at the time of discharge.    Discharge Date  9/19/22    FOLLOW UP ITEMS POST DISCHARGE  Neurology  Surgery     DISCHARGE DIAGNOSES  Principal Problem:    Laceration of head with complication, initial encounter POA: Yes  Active Problems:    MS (multiple sclerosis) ([facility]) POA: Yes      Overview: IMO load  March 2020    Protein S deficiency (HCC) POA: Yes      Overview: PE at age 16, 11 DVTs     Leukocytosis POA: Yes    Fall POA: Unknown    Lactic acidosis POA: Unknown    Acute blood loss anemia POA: Yes  Resolved Problems:    * No resolved hospital problems. *      FOLLOW UP  No future appointments.  George Betts D.O.  [address]  [place] [address]  [phone]    Schedule an appointment as soon as possible for a visit in 10 day(s)  For wound re-check, For suture removal. Staples to be removed only after 14 days      MEDICATIONS ON DISCHARGE     Medication List        START taking these medications        Instructions   oxyCODONE immediate-release 5 MG Tabs  Commonly known as: ROXICODONE   Take 1 Tablet by mouth every 8 hours as needed for Severe Pain for up to 3 days.  Dose: 5 mg            CONTINUE taking these medications        Instructions   alendronate 70 MG Tabs  Commonly known as: FOSAMAX   Take 1 Tablet by mouth every 7 days.  Dose: 70 mg     Biotin 1000 MCG Chew   Take 3,000 mcg by mouth every day.  Dose: 3,000 mcg     calcium carbonate 500 MG Chew  Commonly known as: Tums   Take 500 mg by mouth every day.  Dose: 500 mg     diphenhydrAMINE 25 MG Tabs  Commonly known as: BENADRYL   Take 25 mg by mouth at bedtime as needed for Sleep.  Dose: 25 mg     docosahexanoic acid 1000 MG Caps  Commonly known as: OMEGA 3 FA   Take 1,000 mg by mouth every evening.  Dose: 1,000 mg     magnesium oxide 400 MG Tabs tablet  Commonly known as: MAG-OX   Take 1 Tab by mouth every day.  Dose: 400 mg     Pain Pump Yas  Commonly known as: patient supplied   Inject 1 Each as directed continuous. Patient's Pain Pump (placed and maintained as an outpatient)    Medications/concentrations: Baclofen 650.0 mcg/mL  Route: Intrathecal  Last changed: 09/01/2022  Refill pump before date: 01/07/2023  Continuous infusion rates (Drug/Rate): 55.01 mcg/day (2.29 mcg/hr)  Patient activation dose: 90.78 mcg   Patient activation  lockout interval: 1 hr  Maximum activations per day: 3/day    MD office: [facility]  Phone number: [phone]  Dose: 1 Each     PARoxetine 20 MG Tabs  Commonly known as: PAXIL   Take 1 Tablet by mouth every day.  Dose: 20 mg     rivaroxaban 20 MG Tabs tablet  Commonly known as: Xarelto   Take 1 Tablet by mouth with dinner.  Dose: 20 mg     TYSABRI IV   1 Dose by Intravenous route Q 4 Weeks.  Dose: 1 Dose     vitamin D 1000 Unit (25 mcg) Tabs  Commonly known as: cholecalciferol   Take 1,000 Units by mouth 2 Times a Day.  Dose: 1,000 Units              Allergies  Allergies   Allergen Reactions    Nubain [Nalbuphine Hcl]      Seizures         DIET  Orders Placed This Encounter   Procedures    Diet Order Diet: Regular     Standing Status:   Standing     Number of Occurrences:   1     Order Specific Question:   Diet:     Answer:   Regular [1]       ACTIVITY  As tolerated.  Weight bearing as tolerated    CONSULTATIONS  Trauma surgery     PROCEDURES  None     LABORATORY  Lab Results   Component Value Date    SODIUM 143 09/19/2022    POTASSIUM 4.2 09/19/2022    CHLORIDE 113 (H) 09/19/2022    CO2 24 09/19/2022    GLUCOSE 104 (H) 09/19/2022    BUN 13 09/19/2022    CREATININE 0.80 09/19/2022        Lab Results   Component Value Date    WBC 9.2 09/19/2022    HEMOGLOBIN 8.1 (L) 09/19/2022    HEMATOCRIT 24.3 (L) 09/19/2022    PLATELETCT 252 09/19/2022        Total time of the discharge process exceeds 41 minutes.

## 2022-09-19 NOTE — DISCHARGE INSTRUCTIONS
Discharge Instructions    Discharged to home by car with relative. Discharged via wheelchair, hospital escort: Yes.  Special equipment needed:     Be sure to schedule a follow-up appointment with your primary care doctor or any specialists as instructed.     Discharge Plan:   Diet Plan: Discussed  Activity Level: Discussed  Confirmed Follow up Appointment: Patient to Call and Schedule Appointment  Confirmed Symptoms Management: Discussed  Medication Reconciliation Updated: Yes    I understand that a diet low in cholesterol, fat, and sodium is recommended for good health. Unless I have been given specific instructions below for another diet, I accept this instruction as my diet prescription.   Other diet:     Special Instructions: None    -Is this patient being discharged with medication to prevent blood clots?  No    Is patient discharged on Warfarin / Coumadin?   No   Laceration Care, Adult  A laceration is a cut that may go through all layers of the skin. The cut may also go into the tissue that is right under the skin. Some cuts heal on their own. Others need to be closed with stitches (sutures), staples, skin adhesive strips, or skin glue. Taking care of your injury lowers your risk of infection, helps your injury to heal better, and may prevent scarring.  Supplies needed:  Soap.  Water.  Hand .  Bandage (dressing).  Antibiotic ointment.  Clean towel.  How to take care of your cut  Wash your hands with soap and water before touching your wound or changing your bandage. If soap and water are not available, use hand .  If your doctor used stitches or staples:  Keep the wound clean and dry.  If you were given a bandage, change it at least once a day as told by your doctor. You should also change it if it gets wet or dirty.  Keep the wound completely dry for the first 24 hours, or as told by your doctor. After that, you may take a shower or a bath. Do not get the wound soaked in water until after  the stitches or staples have been removed.  Clean the wound once a day, or as told by your doctor:  Wash the wound with soap and water.  Rinse the wound with water to remove all soap.  Pat the wound dry with a clean towel. Do not rub the wound.  After you clean the wound, put a thin layer of antibiotic ointment on it as told by your doctor. This ointment:  Helps to prevent infection.  Keeps the bandage from sticking to the wound.  Have your stitches or staples removed as told by your doctor.  If your doctor used skin adhesive strips:  Keep the wound clean and dry.  If you were given a bandage, you should change it at least once a day as told by your doctor. You should also change it if it gets wet or dirty.  Do not get the skin adhesive strips wet. You can take a shower or a bath, but keep the wound dry.  If the wound gets wet, pat it dry with a clean towel. Do not rub the wound.  Skin adhesive strips fall off on their own. You can trim the strips as the wound heals. Do not remove any strips that are still stuck to the wound. They will fall off after a while.  If your doctor used skin glue:  Try to keep your wound dry, but you may briefly wet it in the shower or bath. Do not soak the wound in water, such as by swimming.  After you take a shower or a bath, gently pat the wound dry with a clean towel. Do not rub the wound.  Do not do any activities that will make you really sweaty until the skin glue has fallen off on its own.  Do not apply liquid, cream, or ointment medicine to your wound while the skin glue is still on.  If you were given a bandage, you should change it at least once a day or as told by your doctor. You should also change it if it gets dirty or wet.  If a bandage is placed over the wound, do not let the tape touch the skin glue.  Do not pick at the glue. The skin glue usually stays on for 5-10 days. Then, it falls off the skin.  General instructions    Take over-the-counter and prescription  medicines only as told by your doctor.  If you were given antibiotic medicine or ointment, take or apply it as told by your doctor. Do not stop using it even if your condition improves.  Do not scratch or pick at the wound.  Check your wound every day for signs of infection. Watch for:  Redness, swelling, or pain.  Fluid, blood, or pus.  Raise (elevate) the injured area above the level of your heart while you are sitting or lying down.  If directed, put ice on the affected area:  Put ice in a plastic bag.  Place a towel between your skin and the bag.  Leave the ice on for 20 minutes, 2-3 times a day.  Prevent scarring by covering your wound with sunscreen of at least 30 SPF whenever you are outside after your wound has healed.  Keep all follow-up visits as told by your doctor. This is important.  Get help if:  You got a tetanus shot and you have any of these problems at the injection site:  Swelling.  Very bad pain.  Redness.  Bleeding.  You have a fever.  A wound that was closed breaks open.  You notice a bad smell coming from your wound or your bandage.  You notice something coming out of the wound, such as wood or glass.  Medicine does not relieve your pain.  You have more redness, swelling, or pain at the site of your wound.  You have fluid, blood, or pus coming from your wound.  You notice a change in the color of your skin near your wound.  You need to change the bandage often because fluid, blood, or pus is coming from the wound.  You start to have a new rash.  You start to have numbness around the wound.  Get help right away if:  You have very bad swelling around the wound.  Your pain suddenly gets worse and is very bad.  You notice painful lumps near the wound or anywhere on your body.  You have a red streak going away from your wound.  The wound is on your hand or foot, and:  You cannot move a finger or toe.  Your fingers or toes look pale or bluish.  Summary  A laceration is a cut that may go through all  layers of the skin. The cut may also go into the tissue right under the skin.  Some cuts heal on their own. Others need to be closed with stitches, staples, skin adhesive strips, or skin glue.  Follow your doctor's instructions for caring for your cut. Proper care of a cut lowers the risk of infection, helps the cut heal better, and prevents scarring.  This information is not intended to replace advice given to you by your health care provider. Make sure you discuss any questions you have with your health care provider.  Document Released: 06/05/2009 Document Revised: 02/15/2019 Document Reviewed: 01/07/2019  Elsevier Patient Education © 2020 [employer]

## 2022-09-19 NOTE — PROGRESS NOTES
Trauma / Surgical Daily Progress Note    Date of Service  9/19/2022    Chief Complaint  57 y.o. female admitted 9/13/2022 with scalp laceration and anemia after ground-level fall.     Interval Events  Hbg stable. Xarelto re-started yesterday. Hematoma unchanged.     - Continue to monitor serial hemoglobins   - Monitor for increasing size or bleeding from scalp hematoma  - Staple removal after 14 days  - Surgical team will sign off  - Please contact with questions/concerns    Review of Systems  Review of Systems   Constitutional:  Negative for chills and fever.   Eyes:  Negative for blurred vision and double vision.   Gastrointestinal:  Negative for nausea and vomiting.   Musculoskeletal:  Positive for myalgias.   Neurological:  Positive for headaches. Negative for dizziness.      Vital Signs  Temp:  [36.6 °C (97.9 °F)-37.2 °C (99 °F)] 36.6 °C (97.9 °F)  Pulse:  [] 82  Resp:  [18] 18  BP: (118-126)/(80-87) 126/87  SpO2:  [95 %-97 %] 95 %    Physical Exam  Physical Exam  Vitals and nursing note reviewed.   Constitutional:       General: She is not in acute distress.     Appearance: She is not ill-appearing.   HENT:      Head: Normocephalic.      Comments: Large laceration of the left frontal temporal parietal scalp with large but very soft underlying hematoma, staples in place. Unchanged.   Bilateral periorbital ecchymosis, improving  Cardiovascular:      Rate and Rhythm: Normal rate.   Pulmonary:      Effort: Pulmonary effort is normal. No respiratory distress.   Musculoskeletal:      Comments: Moves all extremities   Neurological:      Mental Status: She is alert and oriented to person, place, and time.      GCS: GCS eye subscore is 4. GCS verbal subscore is 5. GCS motor subscore is 6.   Psychiatric:         Behavior: Behavior normal.       Laboratory  Recent Results (from the past 24 hour(s))   CBC WITHOUT DIFFERENTIAL    Collection Time: 09/19/22  2:40 AM   Result Value Ref Range    WBC 9.2 4.8 - 10.8 K/uL     RBC 2.55 (L) 4.20 - 5.40 M/uL    Hemoglobin 8.1 (L) 12.0 - 16.0 g/dL    Hematocrit 24.3 (L) 37.0 - 47.0 %    MCV 95.3 81.4 - 97.8 fL    MCH 31.8 27.0 - 33.0 pg    MCHC 33.3 (L) 33.6 - 35.0 g/dL    RDW 51.6 (H) 35.9 - 50.0 fL    Platelet Count 252 164 - 446 K/uL    MPV 10.7 9.0 - 12.9 fL   Basic Metabolic Panel    Collection Time: 09/19/22  2:40 AM   Result Value Ref Range    Sodium 143 135 - 145 mmol/L    Potassium 4.2 3.6 - 5.5 mmol/L    Chloride 113 (H) 96 - 112 mmol/L    Co2 24 20 - 33 mmol/L    Glucose 104 (H) 65 - 99 mg/dL    Bun 13 8 - 22 mg/dL    Creatinine 0.80 0.50 - 1.40 mg/dL    Calcium 8.1 (L) 8.5 - 10.5 mg/dL    Anion Gap 6.0 (L) 7.0 - 16.0   ESTIMATED GFR    Collection Time: 09/19/22  2:40 AM   Result Value Ref Range    GFR (CKD-EPI) 86 >60 mL/min/1.73 m 2       Fluids    Intake/Output Summary (Last 24 hours) at 9/19/2022 1159  Last data filed at 9/19/2022 0800  Gross per 24 hour   Intake 240 ml   Output --   Net 240 ml       Assessment/Plan  * Laceration of head with complication, initial encounter- (present on admission)  Assessment & Plan  Scalp laceration with hematoma.  9/15 Head CT without abnormality.  Repaired with staples.  Compression dressing.  9/17 No indication for surgical intervention at this time.  - Recommend restarting anticoagulation  9/18 Hgb stable. No change in hematoma.  Staple removal after 14 days. Follow up in office.  Surgical team will sign off.      Discussed patient condition with RN, Patient, and trauma surgery. Dr. George Betts.

## 2022-09-19 NOTE — PROGRESS NOTES
Report received, assumed care at 1900. Pt A&Ox4. No family is at bedside. VSS.  Denies pain. SBA w/ FWW up to BR.  Bed alarm in place and/on bed turned on.  Incision to head is MIKE with staples and CDI.  Pt updated on POC, needs met and questions answered.  Calls appropriately. Call light within reach and working properly.

## 2022-09-19 NOTE — DISCHARGE PLANNING
Meds-to-Beds: Discharge prescription order listed below delivered to patient in discharge lounge. RN notified. Patient counseled. Co-payment processed by pharmacy.     Current Outpatient Medications   Medication Sig Dispense Refill    oxyCODONE immediate-release (ROXICODONE) 5 MG Tab Take 1 Tablet by mouth every 8 hours as needed for Severe Pain for up to 3 days. 9 Tablet 0      Michaela Bruno, PharmD

## 2022-09-19 NOTE — PROGRESS NOTES
Pt is A&Ox4.  Pt does/does not follow commands and cooperates.  No Family is at bedside. VSS.  Denies pain/ C/O pain.  Will give pain meds per orders.  Pt is not showing any nonverbal signs of being in pain at this time.  Crushed oral medications and given in applesauce and given through PEG/core trac tube.  Receiving continuous t/f per orders to PEG tube/core trac. Pt MO[place] =, pt is a assist w/ FWW up to BR/ when OOB.  Up self/SBA.  pt is a two assist q 2 hour turns.  Bilat wrist restraints/ Bed alarm in place and/on bed turned on.  Denies N/T at this time but this is not new.  CMS is intact.  Denies N/V.  Denies passing gas.  Incision to Dressing to are CDI.  [place] in place to bulb/self suction w/ small serosang drainage.   Telemonitor in place.  [place]campo to DD/Pt is incontinent.  Educated pt on changing position Q 2 hours to prevent pressure ulcers.  Pt verbalizes understanding.  Pt is a q 2 hour turns.  Will continue to monitor.  Pt updated on POC, needs met and questions answered.  No medical changes from previous assessment noted at this time.  Calls appropriately.  Call light within reach and working properly.  Hourly rounding in practice minimized per pt request.

## 2022-09-19 NOTE — DISCHARGE PLANNING
Case Management Discharge Planning    Admission Date: 9/13/2022  GMLOS: 2.9  ALOS: 4    6-Clicks ADL Score: 24  6-Clicks Mobility Score: 24      Anticipated Discharge Dispo: Discharge Disposition: Discharged to home/self care (01)  Discharge Address: [address] [place] [address].  [place] [address]  Discharge Contact Phone Number: [phone]    DME Needed: No    Action(s) Taken: Updated Provider/Nurse on Discharge Plan and OTHER: 2nd IM Letter signed & uploaded.  Pt to D/C to home w/ No Needs.  Pt has family for support.     Escalations Completed: Provider and Bedside RN    Medically Clear: No    Next Steps: Pending medical clearance    Barriers to Discharge: Medical clearance    Is the patient up for discharge tomorrow: No,  Pt may D/C either today 9/19/22 or tomorrow 9/20/22 pending medical clearance.  D/C to home w/ no needs & Pt's spouse to provide transport.

## 2022-09-19 NOTE — CARE PLAN
The patient is Stable - Low risk of patient condition declining or worsening    Shift Goals  Clinical Goals: Pain control, free from falls  Patient Goals: discharge home  Family Goals: BOY- no family present    Progress made toward(s) clinical / shift goals:    Problem: Pain - Standard  Goal: Alleviation of pain or a reduction in pain to the patient’s comfort goal  9/18/2022 2151 by Samra Torre RJocelynN.  Outcome: Progressing  Flowsheets (Taken 9/18/2022 2000)  Pain Rating Scale (NPRS): 1  Note: Pain assessed Q2h. Pain medication given per orders, see MAR.   9/18/2022 2150 by Samra Torre [email or web address].  Outcome: Progressing     Problem: Fall Risk  Goal: Patient will remain free from falls  9/18/2022 2151 by Samra Torre, R.N.  Note: Fall precaution in place, bed alarm turned on and in place.  Reminded pt to call for assistance when she needs to get OOB/assitance.  Pt verbalizes understanding  Bed in the lowest locked position. Treaded socks on pt. Call light within reach.    9/18/2022 2150 by Samra Torre, R.N.  Note: Fall precaution in place, bed alarm turned on and in place.  Reminded pt to call for assistance when she needs to get OOB/assitance.  Pt verbalizes understanding  Bed in the lowest locked position. Treaded socks on pt. Call light within reach.    9/18/2022 2150 by Samra Torre, R.N.  Outcome: Progressing

## 2022-09-20 ENCOUNTER — PATIENT OUTREACH (OUTPATIENT)
Dept: MEDICAL GROUP | Facility: PHYSICIAN GROUP | Age: 57
End: 2022-09-20
Payer: MEDICARE

## 2022-09-20 ENCOUNTER — HOME CARE VISIT (OUTPATIENT)
Dept: HOME HEALTH SERVICES | Facility: HOME HEALTHCARE | Age: 57
End: 2022-09-20

## 2022-12-27 ENCOUNTER — DOCUMENTATION (OUTPATIENT)
Dept: HEALTH INFORMATION MANAGEMENT | Facility: OTHER | Age: 57
End: 2022-12-27

## (undated) DEVICE — SUTURE QUILL 0 PDO 14X14 - (12/BX)

## (undated) DEVICE — DRESSING TRANSPARENT FILM TEGADERM 2.375 X 2.75"  (100EA/BX)"

## (undated) DEVICE — NEEDLE DRIVER LARGE DA VINCI 10X'S REUSABLE

## (undated) DEVICE — PAD OR TABLE DA VINCI 2IN X 20IN X 72IN - (12EA/CA)

## (undated) DEVICE — ARMREST CRADLE FOAM - (2PR/PK 12PR/CA)

## (undated) DEVICE — SYRINGE ASEPTO - (50EA/CA

## (undated) DEVICE — GOWN WARMING STANDARD FLEX - (30/CA)

## (undated) DEVICE — SUTURE 3-0 MONOCRYL PLUS PS-1 - 27 INCH (36/BX)

## (undated) DEVICE — SLEEVE, VASO, THIGH, MED

## (undated) DEVICE — SPECIMEN BAG ENDOCATCH II15MM 15MM SPECIMEN RETRIEVAL (3EA/CA)

## (undated) DEVICE — GLOVE BIOGEL PI INDICATOR SZ 6.5 SURGICAL PF LF - (50/BX 4BX/CA)

## (undated) DEVICE — SYRINGE SAFETY 10 ML 18 GA X 1 1/2 BLUNT LL (100/BX 4BX/CA)

## (undated) DEVICE — FORCEPS MARYLAND BIPOLAR DA VINCI 10X'S REUSABLE

## (undated) DEVICE — SET SUCTION/IRRIGATION WITH DISPOSABLE TIP (6/CA )PART #0250-070-520 IS A SUB

## (undated) DEVICE — GLOVE BIOGEL PI ORTHO SZ 7 PF LF (40PR/BX)

## (undated) DEVICE — WATER IRRIG. STER. 1500 ML - (9/CA)

## (undated) DEVICE — SET EXTENSION WITH 2 PORTS (48EA/CA) ***PART #2C8610 IS A SUBSTITUTE*****

## (undated) DEVICE — CHLORAPREP 26 ML APPLICATOR - ORANGE TINT(25/CA)

## (undated) DEVICE — GLOVE SZ 6 BIOGEL PI MICRO - PF LF (50PR/BX 4BX/CA)

## (undated) DEVICE — TUBE E-T HI-LO CUFF 7.0MM (10EA/PK)

## (undated) DEVICE — MASK ANESTHESIA ADULT  - (100/CA)

## (undated) DEVICE — KIT ANESTHESIA W/CIRCUIT & 3/LT BAG W/FILTER (20EA/CA)

## (undated) DEVICE — SEAL 5MM-8MM UNIVERSAL  BOX OF 10

## (undated) DEVICE — SPATULA PERMANENT CAUTERY DA VINCI 10X'S REUSABLE

## (undated) DEVICE — SUCTION INSTRUMENT YANKAUER BULBOUS TIP W/O VENT (50EA/CA)

## (undated) DEVICE — CANISTER SUCTION 3000ML MECHANICAL FILTER AUTO SHUTOFF MEDI-VAC NONSTERILE LF DISP  (40EA/CA)

## (undated) DEVICE — SUTURE 2-0 VICRYL PLUS CT-2 - 27 INCH (36/BX)

## (undated) DEVICE — WATER IRRIG. STER 3000 ML - (4/CA)

## (undated) DEVICE — TUBING CLEARLINK DUO-VENT - C-FLO (48EA/CA)

## (undated) DEVICE — SPONGE GAUZESTER. 2X2 4-PL - (2/PK 50PK/BX 30BX/CS)

## (undated) DEVICE — SET LEADWIRE 5 LEAD BEDSIDE DISPOSABLE ECG (1SET OF 5/EA)

## (undated) DEVICE — ELECTRODE 850 FOAM ADHESIVE - HYDROGEL RADIOTRNSPRNT (50/PK)

## (undated) DEVICE — DRAPE COLUMN  BOX OF 20

## (undated) DEVICE — NEEDLE SPINAL NON-SAFETY 18 GA X 3 IN (25EA/BX)

## (undated) DEVICE — SUTURE GENERAL

## (undated) DEVICE — ELECTRODE DUAL RETURN W/ CORD - (50/PK)

## (undated) DEVICE — PACK GYN DAVINCI (2EA/CA)

## (undated) DEVICE — GLOVE SZ 6.5 BIOGEL PI MICRO - PF LF (50PR/BX)

## (undated) DEVICE — LACTATED RINGERS INJ 1000 ML - (14EA/CA 60CA/PF)

## (undated) DEVICE — KIT ROOM DECONTAMINATION

## (undated) DEVICE — NEEDLE INSUFFLATION FOR STEP - (12/BX)

## (undated) DEVICE — NEEDLE DRIVER MEGA SUTURECUT DA VINCI 15X'S REUSABLE

## (undated) DEVICE — HEAD HOLDER JUNIOR/ADULT

## (undated) DEVICE — DRAPE ARM  BOX OF 20

## (undated) DEVICE — FORCEPS PROGRASP DA VINCI 10X'S REUSABLE

## (undated) DEVICE — ROBOTIC SURGERY SERVICES

## (undated) DEVICE — SENSOR SPO2 NEO LNCS ADHESIVE (20/BX) SEE USER NOTES